# Patient Record
Sex: FEMALE | Race: WHITE | Employment: OTHER | URBAN - METROPOLITAN AREA
[De-identification: names, ages, dates, MRNs, and addresses within clinical notes are randomized per-mention and may not be internally consistent; named-entity substitution may affect disease eponyms.]

---

## 2017-01-05 ENCOUNTER — LAB ENCOUNTER (OUTPATIENT)
Dept: LAB | Age: 82
End: 2017-01-05
Attending: INTERNAL MEDICINE
Payer: MEDICARE

## 2017-01-05 DIAGNOSIS — C91.10 CLL (CHRONIC LYMPHOCYTIC LEUKEMIA) (HCC): ICD-10-CM

## 2017-01-05 DIAGNOSIS — Z79.4 TYPE 2 DIABETES MELLITUS WITH BOTH EYES AFFECTED BY MODERATE NONPROLIFERATIVE RETINOPATHY WITHOUT MACULAR EDEMA, WITH LONG-TERM CURRENT USE OF INSULIN (HCC): ICD-10-CM

## 2017-01-05 DIAGNOSIS — E78.00 PURE HYPERCHOLESTEROLEMIA: ICD-10-CM

## 2017-01-05 DIAGNOSIS — E11.3393 TYPE 2 DIABETES MELLITUS WITH BOTH EYES AFFECTED BY MODERATE NONPROLIFERATIVE RETINOPATHY WITHOUT MACULAR EDEMA, WITH LONG-TERM CURRENT USE OF INSULIN (HCC): ICD-10-CM

## 2017-01-05 DIAGNOSIS — I10 ESSENTIAL HYPERTENSION WITH GOAL BLOOD PRESSURE LESS THAN 140/90: ICD-10-CM

## 2017-01-05 LAB
ALBUMIN SERPL BCP-MCNC: 3.8 G/DL (ref 3.5–4.8)
ALBUMIN/GLOB SERPL: 1.4 {RATIO} (ref 1–2)
ALP SERPL-CCNC: 80 U/L (ref 32–100)
ALT SERPL-CCNC: 28 U/L (ref 14–54)
ANION GAP SERPL CALC-SCNC: 9 MMOL/L (ref 0–18)
AST SERPL-CCNC: 31 U/L (ref 15–41)
BASOPHILS # BLD: 0 K/UL (ref 0–0.2)
BASOPHILS NFR BLD: 0 %
BILIRUB SERPL-MCNC: 0.9 MG/DL (ref 0.3–1.2)
BUN SERPL-MCNC: 20 MG/DL (ref 8–20)
BUN/CREAT SERPL: 18.5 (ref 10–20)
CALCIUM SERPL-MCNC: 9.3 MG/DL (ref 8.5–10.5)
CHLORIDE SERPL-SCNC: 102 MMOL/L (ref 95–110)
CO2 SERPL-SCNC: 26 MMOL/L (ref 22–32)
CREAT SERPL-MCNC: 1.08 MG/DL (ref 0.5–1.5)
EOSINOPHIL # BLD: 0.3 K/UL (ref 0–0.7)
EOSINOPHIL NFR BLD: 1 %
ERYTHROCYTE [DISTWIDTH] IN BLOOD BY AUTOMATED COUNT: 14.9 % (ref 11–15)
GLOBULIN PLAS-MCNC: 2.7 G/DL (ref 2.5–3.7)
GLUCOSE SERPL-MCNC: 291 MG/DL (ref 70–99)
HBA1C MFR BLD: 7.9 % (ref 4–6)
HCT VFR BLD AUTO: 39.5 % (ref 35–48)
HGB BLD-MCNC: 12.6 G/DL (ref 12–16)
LYMPHOCYTES # BLD: 9.2 K/UL (ref 1–4)
LYMPHOCYTES NFR BLD: 34 %
MCH RBC QN AUTO: 28.4 PG (ref 27–32)
MCHC RBC AUTO-ENTMCNC: 32 G/DL (ref 32–37)
MCV RBC AUTO: 88.8 FL (ref 80–100)
MONOCYTES # BLD: 1.4 K/UL (ref 0–1)
MONOCYTES NFR BLD: 5 %
NEUTROPHILS # BLD AUTO: 16.3 K/UL (ref 1.8–7.7)
NEUTROPHILS NFR BLD: 59 %
NEUTS BAND NFR BLD: 1 %
OSMOLALITY UR CALC.SUM OF ELEC: 297 MOSM/KG (ref 275–295)
PLATELET # BLD AUTO: 259 K/UL (ref 140–400)
PMV BLD AUTO: 8.4 FL (ref 7.4–10.3)
POTASSIUM SERPL-SCNC: 4.6 MMOL/L (ref 3.3–5.1)
PROT SERPL-MCNC: 6.5 G/DL (ref 5.9–8.4)
RBC # BLD AUTO: 4.44 M/UL (ref 3.7–5.4)
SODIUM SERPL-SCNC: 137 MMOL/L (ref 136–144)
WBC # BLD AUTO: 27.1 K/UL (ref 4–11)

## 2017-01-05 PROCEDURE — 36415 COLL VENOUS BLD VENIPUNCTURE: CPT

## 2017-01-05 PROCEDURE — 83036 HEMOGLOBIN GLYCOSYLATED A1C: CPT

## 2017-01-05 PROCEDURE — 85025 COMPLETE CBC W/AUTO DIFF WBC: CPT

## 2017-01-05 PROCEDURE — 85007 BL SMEAR W/DIFF WBC COUNT: CPT

## 2017-01-05 PROCEDURE — 80053 COMPREHEN METABOLIC PANEL: CPT

## 2017-01-05 PROCEDURE — 85027 COMPLETE CBC AUTOMATED: CPT

## 2017-01-11 ENCOUNTER — OFFICE VISIT (OUTPATIENT)
Dept: INTERNAL MEDICINE CLINIC | Facility: CLINIC | Age: 82
End: 2017-01-11

## 2017-01-11 VITALS
BODY MASS INDEX: 21 KG/M2 | WEIGHT: 129 LBS | DIASTOLIC BLOOD PRESSURE: 63 MMHG | SYSTOLIC BLOOD PRESSURE: 154 MMHG | HEART RATE: 50 BPM | RESPIRATION RATE: 18 BRPM

## 2017-01-11 DIAGNOSIS — E11.9 TYPE 2 DIABETES MELLITUS WITHOUT COMPLICATION, WITH LONG-TERM CURRENT USE OF INSULIN (HCC): Primary | ICD-10-CM

## 2017-01-11 DIAGNOSIS — Z79.4 TYPE 2 DIABETES MELLITUS WITHOUT COMPLICATION, WITH LONG-TERM CURRENT USE OF INSULIN (HCC): Primary | ICD-10-CM

## 2017-01-11 DIAGNOSIS — I10 ESSENTIAL HYPERTENSION WITH GOAL BLOOD PRESSURE LESS THAN 140/90: ICD-10-CM

## 2017-01-11 DIAGNOSIS — E78.5 HYPERLIPIDEMIA LDL GOAL <100: ICD-10-CM

## 2017-01-11 DIAGNOSIS — R93.89 ABNORMAL CHEST X-RAY: ICD-10-CM

## 2017-01-11 PROCEDURE — 99214 OFFICE O/P EST MOD 30 MIN: CPT | Performed by: INTERNAL MEDICINE

## 2017-01-11 PROCEDURE — G0463 HOSPITAL OUTPT CLINIC VISIT: HCPCS | Performed by: INTERNAL MEDICINE

## 2017-01-12 NOTE — PROGRESS NOTES
HPI:    Patient ID: Cain Garrison is a 80year old female presents for follow-up of diabetes, hypertension, leukemia, hyperlipidemia.     HPI  Patient reports that she has no pain, 2-1/2 months ago she ended up at [facility] after a hypo Disp: 30 tablet Rfl: 6   METOPROLOL SUCCINATE  MG Oral Tablet 24 Hr TAKE 1 AND 1/2 TABLETS BY MOUTH DAILY Disp: 45 tablet Rfl: 6   ASPIR-LOW 81 MG Oral Tab EC TAKE 1 TABLET BY MOUTH DAILY Disp: 30 tablet Rfl: 11   TRAVATAN Z 0.004 % Ophthalmic Soluti (primary encounter diagnosis) continue current medications, encourage patient to make sure she eats 3 meals a day will check labs in 3 months follow-up in 3 months we will check lipids, continue Lipitor for now  Hyperlipidemia ldl goal <100  Abnormal chest

## 2017-02-08 ENCOUNTER — HOSPITAL ENCOUNTER (OUTPATIENT)
Dept: CT IMAGING | Age: 82
Discharge: HOME | End: 2017-02-08
Attending: INTERNAL MEDICINE
Payer: MEDICARE

## 2017-02-08 DIAGNOSIS — R93.89 ABNORMAL CHEST X-RAY: ICD-10-CM

## 2017-02-08 PROCEDURE — 71250 CT THORAX DX C-: CPT

## 2017-02-15 ENCOUNTER — TELEPHONE (OUTPATIENT)
Dept: INTERNAL MEDICINE CLINIC | Facility: CLINIC | Age: 82
End: 2017-02-15

## 2017-02-15 DIAGNOSIS — I27.20 PULMONARY HYPERTENSION (HCC): Primary | ICD-10-CM

## 2017-02-16 ENCOUNTER — OFFICE VISIT (OUTPATIENT)
Dept: OTOLARYNGOLOGY | Facility: CLINIC | Age: 82
End: 2017-02-16

## 2017-02-16 VITALS
TEMPERATURE: 99 F | WEIGHT: 128 LBS | DIASTOLIC BLOOD PRESSURE: 50 MMHG | HEIGHT: 65 IN | SYSTOLIC BLOOD PRESSURE: 132 MMHG | BODY MASS INDEX: 21.33 KG/M2 | HEART RATE: 64 BPM

## 2017-02-16 DIAGNOSIS — H61.23 BILATERAL IMPACTED CERUMEN: Primary | ICD-10-CM

## 2017-02-16 PROCEDURE — 69210 REMOVE IMPACTED EAR WAX UNI: CPT | Performed by: OTOLARYNGOLOGY

## 2017-02-16 NOTE — PROGRESS NOTES
Crisoforo Babinski is a 80year old female.   Patient presents with:  Ear Problem: ear cleaning      HISTORY OF PRESENT ILLNESS    Patient presents for cerumen removal. No other complaints or concerns at this time    Social History    Marital Status:  Dyspnea and wheezing. Cardio Negative Chest pain, irregular heartbeat/palpitations and syncope. GI Negative Abdominal pain and diarrhea. Endocrine Negative Cold intolerance and heat intolerance. Neuro Negative Tremors.    Psych Negative Anxiety and NIGHTLY, Disp: 30 tablet, Rfl: 6  •  LISINOPRIL 40 MG Oral Tab, TAKE 1 TABLET BY MOUTH ONCE A DAY, Disp: 30 tablet, Rfl: 6  •  METOPROLOL SUCCINATE  MG Oral Tablet 24 Hr, TAKE 1 AND 1/2 TABLETS BY MOUTH DAILY, Disp: 45 tablet, Rfl: 6  •  UNIFINE PENT

## 2017-02-20 ENCOUNTER — TELEPHONE (OUTPATIENT)
Dept: INTERNAL MEDICINE CLINIC | Facility: CLINIC | Age: 82
End: 2017-02-20

## 2017-02-20 DIAGNOSIS — I27.20 PULMONARY HYPERTENSION (HCC): Primary | ICD-10-CM

## 2017-02-20 NOTE — TELEPHONE ENCOUNTER
Telephone Encounter by Jimmy Taylor at 2/20/2017  9:13 AM      Author: Jimmy Taylor Service: (none) Author Type: (none)     Filed: 2/20/2017  9:17 AM Note Time: 2/20/2017  9:13 AM Status: Addendum     : Jimmy Taylor       Related Notes: O

## 2017-02-20 NOTE — TELEPHONE ENCOUNTER
Pt's mom states she received a call from the weekend from [address] [place] [address], not sure if its die to mom's results. Best call back number is [phone]. Please advise.

## 2017-02-21 NOTE — TELEPHONE ENCOUNTER
Spoke to daughter Jaye Quintana, reported CT scan findings small pulmonary nodules that need follow-up in 1 year.   Enlarged pulmonary trunk possible sign of pulmonary hypertension, advised to have 2D Doppler echocardiogram done, she will help patient to make arran

## 2017-02-22 NOTE — TELEPHONE ENCOUNTER
John/Josefina returning RN call  Tara Singh she did get pt's scan results, other tests have been scheduled  No callback necessary unless call was for another issue

## 2017-02-22 NOTE — TELEPHONE ENCOUNTER
[facility] transfer to [phone] or [phone] on  2/22/17   Dr. Eugenie Mary already discussed on 2/15/17 encounter last weekl

## 2017-03-02 ENCOUNTER — TELEPHONE (OUTPATIENT)
Dept: INTERNAL MEDICINE CLINIC | Facility: CLINIC | Age: 82
End: 2017-03-02

## 2017-03-02 NOTE — TELEPHONE ENCOUNTER
Barrie Patel from [facility] is calling requesting a refill on this pt's Contour 50's test strips. Please include dx code and testing frequency. Please send script to [facility].  Pt is out of strips

## 2017-03-04 NOTE — TELEPHONE ENCOUNTER
Refill Protocol Appointment Criteria: Refilled per protocol    · Appointment scheduled in the past 12 months or in the next 3 months  Recent Visits       Provider Department Primary Dx    1 month ago Ricci Dias MD [facility], [facility], [address] [place] [address]

## 2017-03-07 RX ORDER — AMLODIPINE BESYLATE 5 MG/1
TABLET ORAL
Qty: 180 TABLET | Refills: 3 | Status: SHIPPED | OUTPATIENT
Start: 2017-03-07 | End: 2018-01-08

## 2017-03-13 ENCOUNTER — HOSPITAL ENCOUNTER (OUTPATIENT)
Dept: CV DIAGNOSTICS | Facility: HOSPITAL | Age: 82
Discharge: HOME | End: 2017-03-13
Attending: INTERNAL MEDICINE
Payer: MEDICARE

## 2017-03-13 DIAGNOSIS — I27.20 PULMONARY HYPERTENSION (HCC): ICD-10-CM

## 2017-03-13 PROCEDURE — 93306 TTE W/DOPPLER COMPLETE: CPT

## 2017-03-13 PROCEDURE — 93306 TTE W/DOPPLER COMPLETE: CPT | Performed by: INTERNAL MEDICINE

## 2017-03-16 ENCOUNTER — TELEPHONE (OUTPATIENT)
Dept: INTERNAL MEDICINE CLINIC | Facility: CLINIC | Age: 82
End: 2017-03-16

## 2017-03-16 NOTE — TELEPHONE ENCOUNTER
Nurse from [facility] was to to call if Pt bloods sugar was getting low . Would like to speak to a nurse .

## 2017-03-17 ENCOUNTER — TELEPHONE (OUTPATIENT)
Dept: INTERNAL MEDICINE CLINIC | Facility: CLINIC | Age: 82
End: 2017-03-17

## 2017-03-17 DIAGNOSIS — C91.10 CLL (CHRONIC LYMPHOCYTIC LEUKEMIA) (HCC): Primary | ICD-10-CM

## 2017-03-17 DIAGNOSIS — E11.319 TYPE 2 DIABETES MELLITUS WITH RETINOPATHY, WITH LONG-TERM CURRENT USE OF INSULIN, MACULAR EDEMA PRESENCE UNSPECIFIED, UNSPECIFIED LATERALITY, UNSPECIFIED RETINOPATHY SEVERITY (HCC): ICD-10-CM

## 2017-03-17 DIAGNOSIS — Z79.4 TYPE 2 DIABETES MELLITUS WITH RETINOPATHY, WITH LONG-TERM CURRENT USE OF INSULIN, MACULAR EDEMA PRESENCE UNSPECIFIED, UNSPECIFIED LATERALITY, UNSPECIFIED RETINOPATHY SEVERITY (HCC): ICD-10-CM

## 2017-03-17 NOTE — TELEPHONE ENCOUNTER
Nurse from [facility] stated they had called last night to ask in regards to insulin that her blood sugar was 94. They gave her 5 Units of her insulin instead of the 15 Units she has ordered. Blood sugar this morning was 219.      NK as an [place]

## 2017-03-23 RX ORDER — INSULIN ASPART 100 [IU]/ML
INJECTION, SUSPENSION SUBCUTANEOUS
Qty: 15 ML | Refills: 10 | Status: SHIPPED | OUTPATIENT
Start: 2017-03-23 | End: 2017-07-12

## 2017-04-03 ENCOUNTER — HOSPITAL ENCOUNTER (EMERGENCY)
Facility: HOSPITAL | Age: 82
Discharge: HOME | End: 2017-04-03
Attending: EMERGENCY MEDICINE
Payer: MEDICARE

## 2017-04-03 ENCOUNTER — APPOINTMENT (OUTPATIENT)
Dept: GENERAL RADIOLOGY | Facility: HOSPITAL | Age: 82
End: 2017-04-03
Attending: EMERGENCY MEDICINE
Payer: MEDICARE

## 2017-04-03 VITALS
OXYGEN SATURATION: 95 % | RESPIRATION RATE: 18 BRPM | TEMPERATURE: 98 F | HEART RATE: 71 BPM | WEIGHT: 129 LBS | HEIGHT: 65 IN | BODY MASS INDEX: 21.49 KG/M2 | DIASTOLIC BLOOD PRESSURE: 83 MMHG | SYSTOLIC BLOOD PRESSURE: 155 MMHG

## 2017-04-03 DIAGNOSIS — M25.552 LEFT HIP PAIN: Primary | ICD-10-CM

## 2017-04-03 PROCEDURE — 73502 X-RAY EXAM HIP UNI 2-3 VIEWS: CPT

## 2017-04-03 PROCEDURE — 99283 EMERGENCY DEPT VISIT LOW MDM: CPT

## 2017-04-03 RX ORDER — TRAMADOL HYDROCHLORIDE 50 MG/1
50 TABLET ORAL ONCE
Status: COMPLETED | OUTPATIENT
Start: 2017-04-03 | End: 2017-04-03

## 2017-04-03 RX ORDER — TRAMADOL HYDROCHLORIDE 50 MG/1
50 TABLET ORAL EVERY 12 HOURS PRN
Qty: 10 TABLET | Refills: 0 | Status: SHIPPED | OUTPATIENT
Start: 2017-04-03 | End: 2017-04-08

## 2017-04-03 NOTE — ED PROVIDER NOTES
Patient Seen in: [facility] AND [facility] Emergency Department    History   Patient presents with:  Hip Pain    Stated Complaint: left hip pain      HPI    79 yo F with PMH DM, HTN, colon CA s/p resection, CLL presenting with one day of atraumatic left hip pain. TABLETS BY MOUTH DAILY   UNIFINE PENTIPS 31G X 6 MM Does not apply Misc,  USE TWICE DAILY WITH INSULIN   ASPIR-LOW 81 MG Oral Tab EC,  TAKE 1 TABLET BY MOUTH DAILY   TRAVATAN Z 0.004 % Ophthalmic Solution,         Family History   Problem Relation Age of O warm.   Psychiatric: Cooperative. Nursing note and vitals reviewed.         ED Course   Labs Reviewed - No data to display  Xr Hip W Or Wo Pelvis 2 Or 3 Views, Left (cpt=73502)    4/3/2017  PROCEDURE: XR HIP W OR WO PELVIS 2 OR 3 VIEWS, LEFT (CPT=73502)  C (twelve) hours as needed.   Qty: 10 tablet Refills: 0

## 2017-04-03 NOTE — ED NOTES
All discharge instructions including discharge meds and follow up reviewed with patient. Verbalized understanding. . Patient wheelchaired out of ED in no apparent distress.

## 2017-04-05 ENCOUNTER — LAB ENCOUNTER (OUTPATIENT)
Dept: LAB | Age: 82
End: 2017-04-05
Attending: INTERNAL MEDICINE
Payer: MEDICARE

## 2017-04-05 DIAGNOSIS — E78.5 HYPERLIPIDEMIA LDL GOAL <100: ICD-10-CM

## 2017-04-05 DIAGNOSIS — E11.9 TYPE 2 DIABETES MELLITUS WITHOUT COMPLICATION, WITH LONG-TERM CURRENT USE OF INSULIN (HCC): ICD-10-CM

## 2017-04-05 DIAGNOSIS — Z79.4 TYPE 2 DIABETES MELLITUS WITHOUT COMPLICATION, WITH LONG-TERM CURRENT USE OF INSULIN (HCC): ICD-10-CM

## 2017-04-05 PROCEDURE — 85025 COMPLETE CBC W/AUTO DIFF WBC: CPT

## 2017-04-05 PROCEDURE — 80053 COMPREHEN METABOLIC PANEL: CPT

## 2017-04-05 PROCEDURE — 80061 LIPID PANEL: CPT

## 2017-04-05 PROCEDURE — 36415 COLL VENOUS BLD VENIPUNCTURE: CPT

## 2017-04-05 PROCEDURE — 83036 HEMOGLOBIN GLYCOSYLATED A1C: CPT

## 2017-04-12 ENCOUNTER — OFFICE VISIT (OUTPATIENT)
Dept: INTERNAL MEDICINE CLINIC | Facility: CLINIC | Age: 82
End: 2017-04-12

## 2017-04-12 VITALS
HEART RATE: 53 BPM | DIASTOLIC BLOOD PRESSURE: 53 MMHG | RESPIRATION RATE: 16 BRPM | BODY MASS INDEX: 19 KG/M2 | WEIGHT: 113 LBS | SYSTOLIC BLOOD PRESSURE: 141 MMHG

## 2017-04-12 DIAGNOSIS — I10 ESSENTIAL HYPERTENSION WITH GOAL BLOOD PRESSURE LESS THAN 140/90: ICD-10-CM

## 2017-04-12 DIAGNOSIS — Z79.4 TYPE 2 DIABETES MELLITUS WITHOUT COMPLICATION, WITH LONG-TERM CURRENT USE OF INSULIN (HCC): Primary | ICD-10-CM

## 2017-04-12 DIAGNOSIS — E78.5 HYPERLIPIDEMIA LDL GOAL <100: ICD-10-CM

## 2017-04-12 DIAGNOSIS — C91.10 CLL (CHRONIC LYMPHOCYTIC LEUKEMIA) (HCC): ICD-10-CM

## 2017-04-12 DIAGNOSIS — E11.9 TYPE 2 DIABETES MELLITUS WITHOUT COMPLICATION, WITH LONG-TERM CURRENT USE OF INSULIN (HCC): Primary | ICD-10-CM

## 2017-04-12 PROCEDURE — 99214 OFFICE O/P EST MOD 30 MIN: CPT | Performed by: INTERNAL MEDICINE

## 2017-04-12 PROCEDURE — G0463 HOSPITAL OUTPT CLINIC VISIT: HCPCS | Performed by: INTERNAL MEDICINE

## 2017-04-12 RX ORDER — ATORVASTATIN CALCIUM 10 MG/1
TABLET, FILM COATED ORAL
Qty: 90 TABLET | Refills: 0 | Status: SHIPPED | OUTPATIENT
Start: 2017-04-12 | End: 2017-07-17

## 2017-04-12 RX ORDER — TRAMADOL HYDROCHLORIDE 50 MG/1
TABLET ORAL
Qty: 30 TABLET | Refills: 1 | Status: SHIPPED
Start: 2017-04-12 | End: 2019-01-01

## 2017-04-12 RX ORDER — METOPROLOL SUCCINATE 100 MG/1
TABLET, EXTENDED RELEASE ORAL
Qty: 45 TABLET | Refills: 2 | Status: SHIPPED | OUTPATIENT
Start: 2017-04-12 | End: 2017-07-10

## 2017-04-12 RX ORDER — LISINOPRIL 40 MG/1
TABLET ORAL
Qty: 90 TABLET | Refills: 0 | Status: SHIPPED | OUTPATIENT
Start: 2017-04-12 | End: 2017-07-17

## 2017-04-12 NOTE — TELEPHONE ENCOUNTER
Hypertensive Medications  Protocol Criteria:  · Appointment scheduled in the past 6 months or in the next 3 months  · BMP or CMP in the past 12 months  · Creatinine result < 2  Recent Visits       Provider Department Primary Dx    3 months ago Ghassan Carver months ago Kirsten Chavez MD [facility], [facility], [address], [place] Type 2 diabetes mellitus without complication, with long-term current use of insulin ([facility] 75.)    6 months ago Kirsten Chavez MD [facility], [facility], Main [address], [place] CLL (chronic lymphocytic herbert

## 2017-04-13 NOTE — PROGRESS NOTES
HPI:    Patient ID: Sameer Ly is a 80year old female presents for follow-up of multiple medical conditions.   HPI  Patient reports that about week ago she had episodes of hypoglycemia, when she was unresponsive, paramedics were called administered Timolol Maleate 0.5 % Ophthalmic Solution  Disp:  Rfl:    ASPIR-LOW 81 MG Oral Tab EC TAKE 1 TABLET BY MOUTH DAILY Disp: 30 tablet Rfl: 11   TRAVATAN Z 0.004 % Ophthalmic Solution  Disp:  Rfl:    LISINOPRIL 40 MG Oral Tab TAKE 1 TABLET BY MOUTH ONCE A DA Judgment normal.              ASSESSMENT/PLAN:   Type 2 diabetes mellitus without complication, with long-term current use of insulin (hcc)  (primary encounter diagnosis)  Uncontrolled with recurrence episodes of hypoglycemia, patient is not eating consist

## 2017-06-08 ENCOUNTER — OFFICE VISIT (OUTPATIENT)
Dept: AUDIOLOGY | Facility: CLINIC | Age: 82
End: 2017-06-08

## 2017-06-08 ENCOUNTER — OFFICE VISIT (OUTPATIENT)
Dept: OTOLARYNGOLOGY | Facility: CLINIC | Age: 82
End: 2017-06-08

## 2017-06-08 VITALS
BODY MASS INDEX: 18.16 KG/M2 | DIASTOLIC BLOOD PRESSURE: 62 MMHG | SYSTOLIC BLOOD PRESSURE: 134 MMHG | TEMPERATURE: 98 F | HEIGHT: 66 IN | HEART RATE: 66 BPM | WEIGHT: 113 LBS | RESPIRATION RATE: 14 BRPM

## 2017-06-08 DIAGNOSIS — H61.23 BILATERAL IMPACTED CERUMEN: Primary | ICD-10-CM

## 2017-06-08 DIAGNOSIS — H90.3 SENSORINEURAL HEARING LOSS, BILATERAL: Primary | ICD-10-CM

## 2017-06-08 PROCEDURE — 92593 HEARING AID CHECK, BOTH EARS: CPT | Performed by: AUDIOLOGIST

## 2017-06-08 PROCEDURE — 69210 REMOVE IMPACTED EAR WAX UNI: CPT | Performed by: OTOLARYNGOLOGY

## 2017-06-08 NOTE — PROGRESS NOTES
Sonu Patino is a 80year old female. Patient presents with: Follow - Up: here for ear cleaning - was seen back in 2/2017 by Dr Lashaun Jha - no pain and no other c/o - just the cleaning.     HPI:   She feels that her ears are congested once again     Curr • Essential hypertension       Social History:    Smoking Status: Never Smoker                      Smokeless Status: Never Used                        Alcohol Use: No                 REVIEW OF SYSTEMS:   GENERAL HEALTH: feels well otherwise  GENERAL : d

## 2017-06-08 NOTE — PROGRESS NOTES
[facility]  7/18/1926  [id number]      Pt was seen for a six months follow-up. Aids were in need of cleaning/maintenance. Cleaned and suctioned aids and cShells. Completed sanitizing and drying cycle.   Changed batteri

## 2017-06-21 ENCOUNTER — AUDIOLOGY DOCUMENTATION (OUTPATIENT)
Dept: AUDIOLOGY | Facility: CLINIC | Age: 82
End: 2017-06-21

## 2017-06-21 RX ORDER — ASPIRIN 81 MG/1
81 TABLET ORAL
Qty: 30 TABLET | Refills: 6 | Status: SHIPPED | OUTPATIENT
Start: 2017-06-21 | End: 2017-07-12

## 2017-06-21 NOTE — TELEPHONE ENCOUNTER
[employer] [place] cShell. Called pt. Daughter, Jamarcus Bautista seen at front counter by Dr Mike Payment to replace the cShell onto pt's aid. NC- aid under warranty. RV as previously scheduled. Karli Villalba  6/21/17

## 2017-06-30 ENCOUNTER — LAB ENCOUNTER (OUTPATIENT)
Dept: LAB | Age: 82
End: 2017-06-30
Attending: INTERNAL MEDICINE
Payer: MEDICARE

## 2017-06-30 DIAGNOSIS — C91.10 CLL (CHRONIC LYMPHOCYTIC LEUKEMIA) (HCC): ICD-10-CM

## 2017-06-30 DIAGNOSIS — Z79.4 TYPE 2 DIABETES MELLITUS WITH RETINOPATHY, WITH LONG-TERM CURRENT USE OF INSULIN, MACULAR EDEMA PRESENCE UNSPECIFIED, UNSPECIFIED LATERALITY, UNSPECIFIED RETINOPATHY SEVERITY (HCC): ICD-10-CM

## 2017-06-30 DIAGNOSIS — E11.319 TYPE 2 DIABETES MELLITUS WITH RETINOPATHY, WITH LONG-TERM CURRENT USE OF INSULIN, MACULAR EDEMA PRESENCE UNSPECIFIED, UNSPECIFIED LATERALITY, UNSPECIFIED RETINOPATHY SEVERITY (HCC): ICD-10-CM

## 2017-06-30 LAB
ALBUMIN SERPL BCP-MCNC: 3.9 G/DL (ref 3.5–4.8)
ALBUMIN/GLOB SERPL: 1.6 {RATIO} (ref 1–2)
ALP SERPL-CCNC: 81 U/L (ref 32–100)
ALT SERPL-CCNC: 23 U/L (ref 14–54)
ANION GAP SERPL CALC-SCNC: 9 MMOL/L (ref 0–18)
AST SERPL-CCNC: 28 U/L (ref 15–41)
BASOPHILS # BLD: 0.2 K/UL (ref 0–0.2)
BASOPHILS NFR BLD: 1 %
BILIRUB SERPL-MCNC: 0.9 MG/DL (ref 0.3–1.2)
BUN SERPL-MCNC: 21 MG/DL (ref 8–20)
BUN/CREAT SERPL: 19.6 (ref 10–20)
CALCIUM SERPL-MCNC: 9.4 MG/DL (ref 8.5–10.5)
CHLORIDE SERPL-SCNC: 107 MMOL/L (ref 95–110)
CO2 SERPL-SCNC: 22 MMOL/L (ref 22–32)
CREAT SERPL-MCNC: 1.07 MG/DL (ref 0.5–1.5)
EOSINOPHIL # BLD: 0.2 K/UL (ref 0–0.7)
EOSINOPHIL NFR BLD: 1 %
ERYTHROCYTE [DISTWIDTH] IN BLOOD BY AUTOMATED COUNT: 15.3 % (ref 11–15)
GLOBULIN PLAS-MCNC: 2.4 G/DL (ref 2.5–3.7)
GLUCOSE SERPL-MCNC: 261 MG/DL (ref 70–99)
HBA1C MFR BLD: 8.5 % (ref 4–6)
HCT VFR BLD AUTO: 38.1 % (ref 35–48)
HGB BLD-MCNC: 12.3 G/DL (ref 12–16)
LYMPHOCYTES # BLD: 11.3 K/UL (ref 1–4)
LYMPHOCYTES NFR BLD: 52 %
MCH RBC QN AUTO: 28.2 PG (ref 27–32)
MCHC RBC AUTO-ENTMCNC: 32.4 G/DL (ref 32–37)
MCV RBC AUTO: 87.1 FL (ref 80–100)
MONOCYTES # BLD: 1 K/UL (ref 0–1)
MONOCYTES NFR BLD: 4 %
NEUTROPHILS # BLD AUTO: 9.2 K/UL (ref 1.8–7.7)
NEUTROPHILS NFR BLD: 42 %
OSMOLALITY UR CALC.SUM OF ELEC: 298 MOSM/KG (ref 275–295)
PLATELET # BLD AUTO: 212 K/UL (ref 140–400)
PMV BLD AUTO: 8.5 FL (ref 7.4–10.3)
POTASSIUM SERPL-SCNC: 4.2 MMOL/L (ref 3.3–5.1)
PROT SERPL-MCNC: 6.3 G/DL (ref 5.9–8.4)
RBC # BLD AUTO: 4.37 M/UL (ref 3.7–5.4)
SODIUM SERPL-SCNC: 138 MMOL/L (ref 136–144)
WBC # BLD AUTO: 21.8 K/UL (ref 4–11)

## 2017-06-30 PROCEDURE — 36415 COLL VENOUS BLD VENIPUNCTURE: CPT

## 2017-06-30 PROCEDURE — 83036 HEMOGLOBIN GLYCOSYLATED A1C: CPT

## 2017-06-30 PROCEDURE — 85025 COMPLETE CBC W/AUTO DIFF WBC: CPT

## 2017-06-30 PROCEDURE — 80053 COMPREHEN METABOLIC PANEL: CPT

## 2017-07-10 RX ORDER — METOPROLOL SUCCINATE 100 MG/1
TABLET, EXTENDED RELEASE ORAL
Qty: 45 TABLET | Refills: 6 | Status: SHIPPED | OUTPATIENT
Start: 2017-07-10 | End: 2018-02-06

## 2017-07-12 ENCOUNTER — OFFICE VISIT (OUTPATIENT)
Dept: INTERNAL MEDICINE CLINIC | Facility: CLINIC | Age: 82
End: 2017-07-12

## 2017-07-12 VITALS
HEART RATE: 55 BPM | DIASTOLIC BLOOD PRESSURE: 59 MMHG | SYSTOLIC BLOOD PRESSURE: 138 MMHG | BODY MASS INDEX: 21 KG/M2 | WEIGHT: 128 LBS

## 2017-07-12 DIAGNOSIS — I10 ESSENTIAL HYPERTENSION WITH GOAL BLOOD PRESSURE LESS THAN 140/90: ICD-10-CM

## 2017-07-12 DIAGNOSIS — C91.10 CLL (CHRONIC LYMPHOCYTIC LEUKEMIA) (HCC): ICD-10-CM

## 2017-07-12 DIAGNOSIS — Z79.4 TYPE 2 DIABETES MELLITUS WITH MODERATE NONPROLIFERATIVE RETINOPATHY WITHOUT MACULAR EDEMA, WITH LONG-TERM CURRENT USE OF INSULIN, UNSPECIFIED LATERALITY (HCC): Primary | ICD-10-CM

## 2017-07-12 DIAGNOSIS — E11.3399 TYPE 2 DIABETES MELLITUS WITH MODERATE NONPROLIFERATIVE RETINOPATHY WITHOUT MACULAR EDEMA, WITH LONG-TERM CURRENT USE OF INSULIN, UNSPECIFIED LATERALITY (HCC): Primary | ICD-10-CM

## 2017-07-12 DIAGNOSIS — E08.3599 PROLIFERATIVE DIABETIC RETINOPATHY WITHOUT MACULAR EDEMA ASSOCIATED WITH DIABETES MELLITUS DUE TO UNDERLYING CONDITION, UNSPECIFIED LATERALITY (HCC): ICD-10-CM

## 2017-07-12 PROCEDURE — G0463 HOSPITAL OUTPT CLINIC VISIT: HCPCS | Performed by: INTERNAL MEDICINE

## 2017-07-12 PROCEDURE — 99214 OFFICE O/P EST MOD 30 MIN: CPT | Performed by: INTERNAL MEDICINE

## 2017-07-15 NOTE — PROGRESS NOTES
HPI:    Patient ID: Dana Sawant is a 80year old female presents for follow-up on multiple medical conditions    HPI  Patient reports that she has been doing fair, she reports no hypoglycemic episodes anymore, states that she is eating breakfast ever breakfast  And  15  Units  Before  dinner Disp: 1 pen Rfl: 0   Timolol Maleate 0.5 % Ophthalmic Solution  Disp:  Rfl:    TRAVATAN Z 0.004 % Ophthalmic Solution  Disp:  Rfl:    Glucose Blood (ACCU-CHEK ACTIVE) In Vitro Strip test by Intradermal route 3 time lymphocytic leukemia) (hcc) stable, will monitor CBC every 3 months see hematology as planned  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

## 2017-07-18 RX ORDER — ATORVASTATIN CALCIUM 10 MG/1
TABLET, FILM COATED ORAL
Qty: 90 TABLET | Refills: 2 | Status: SHIPPED | OUTPATIENT
Start: 2017-07-18 | End: 2018-04-26

## 2017-07-18 RX ORDER — LISINOPRIL 40 MG/1
TABLET ORAL
Qty: 90 TABLET | Refills: 2 | Status: SHIPPED | OUTPATIENT
Start: 2017-07-18 | End: 2018-04-10

## 2017-08-07 ENCOUNTER — APPOINTMENT (OUTPATIENT)
Dept: GENERAL RADIOLOGY | Facility: HOSPITAL | Age: 82
End: 2017-08-07
Attending: EMERGENCY MEDICINE
Payer: MEDICARE

## 2017-08-07 ENCOUNTER — APPOINTMENT (OUTPATIENT)
Dept: CT IMAGING | Facility: HOSPITAL | Age: 82
End: 2017-08-07
Attending: EMERGENCY MEDICINE
Payer: MEDICARE

## 2017-08-07 ENCOUNTER — HOSPITAL ENCOUNTER (EMERGENCY)
Facility: HOSPITAL | Age: 82
Discharge: HOME | End: 2017-08-07
Attending: EMERGENCY MEDICINE
Payer: MEDICARE

## 2017-08-07 VITALS
TEMPERATURE: 98 F | BODY MASS INDEX: 21.49 KG/M2 | RESPIRATION RATE: 18 BRPM | DIASTOLIC BLOOD PRESSURE: 52 MMHG | OXYGEN SATURATION: 98 % | SYSTOLIC BLOOD PRESSURE: 152 MMHG | HEIGHT: 65 IN | HEART RATE: 68 BPM | WEIGHT: 129 LBS

## 2017-08-07 DIAGNOSIS — S42.215A CLOSED NONDISPLACED FRACTURE OF SURGICAL NECK OF LEFT HUMERUS, UNSPECIFIED FRACTURE MORPHOLOGY, INITIAL ENCOUNTER: Primary | ICD-10-CM

## 2017-08-07 DIAGNOSIS — S51.012A ELBOW LACERATION, LEFT, INITIAL ENCOUNTER: ICD-10-CM

## 2017-08-07 PROCEDURE — 70450 CT HEAD/BRAIN W/O DYE: CPT | Performed by: EMERGENCY MEDICINE

## 2017-08-07 PROCEDURE — 73080 X-RAY EXAM OF ELBOW: CPT | Performed by: EMERGENCY MEDICINE

## 2017-08-07 PROCEDURE — 73030 X-RAY EXAM OF SHOULDER: CPT | Performed by: EMERGENCY MEDICINE

## 2017-08-07 PROCEDURE — 99284 EMERGENCY DEPT VISIT MOD MDM: CPT

## 2017-08-07 PROCEDURE — 90471 IMMUNIZATION ADMIN: CPT

## 2017-08-07 RX ORDER — DIAPER,BRIEF,INFANT-TODD,DISP
EACH MISCELLANEOUS AS NEEDED
Status: DISCONTINUED | OUTPATIENT
Start: 2017-08-07 | End: 2017-08-07

## 2017-08-07 RX ORDER — TRAMADOL HYDROCHLORIDE 50 MG/1
50 TABLET ORAL EVERY 4 HOURS PRN
Qty: 20 TABLET | Refills: 0 | Status: SHIPPED | OUTPATIENT
Start: 2017-08-07 | End: 2017-08-14

## 2017-08-07 RX ORDER — ACETAMINOPHEN 500 MG
1000 TABLET ORAL ONCE
Status: COMPLETED | OUTPATIENT
Start: 2017-08-07 | End: 2017-08-07

## 2017-08-07 RX ORDER — LIDOCAINE HYDROCHLORIDE AND EPINEPHRINE 20; 5 MG/ML; UG/ML
20 INJECTION, SOLUTION EPIDURAL; INFILTRATION; INTRACAUDAL; PERINEURAL ONCE
Status: COMPLETED | OUTPATIENT
Start: 2017-08-07 | End: 2017-08-07

## 2017-08-07 NOTE — ED INITIAL ASSESSMENT (HPI)
Pt had a fall while getting her mail. Pt just lost balance. Pt denies LOC and states that she has left shoulder pain and a wound to left elbow.  Pt brought in via ems

## 2017-08-08 PROBLEM — S42.202A CLOSED FRACTURE OF PROXIMAL END OF LEFT HUMERUS, UNSPECIFIED FRACTURE MORPHOLOGY, INITIAL ENCOUNTER: Status: ACTIVE | Noted: 2017-08-08

## 2017-08-09 ENCOUNTER — TELEPHONE (OUTPATIENT)
Dept: FAMILY MEDICINE CLINIC | Facility: CLINIC | Age: 82
End: 2017-08-09

## 2017-08-09 DIAGNOSIS — R30.0 DYSURIA: Primary | ICD-10-CM

## 2017-08-09 NOTE — TELEPHONE ENCOUNTER
Please note. Thank you. Pt called and Gail Wilkes RN answered the phone and states that the pt's T was 96.6 orally. Gail Wilkes informed about the orders for U/A and C/S and asked them to be faxed to #[phone].     Orders entered and faxed with instructions

## 2017-08-09 NOTE — TELEPHONE ENCOUNTER
Actions Requested: NK please advise, [facility] staff asking for order for UA/cx  Problem: dysuria  Onset and Timing: 3 days  Associated Symptoms: frequency and urgency  Aggravating by: na  Alleviated by: na  Triage Note: Symptoms as described above.   Janes Herrera

## 2017-08-11 ENCOUNTER — APPOINTMENT (OUTPATIENT)
Dept: GENERAL RADIOLOGY | Facility: HOSPITAL | Age: 82
End: 2017-08-11
Attending: EMERGENCY MEDICINE
Payer: MEDICARE

## 2017-08-11 ENCOUNTER — HOSPITAL ENCOUNTER (EMERGENCY)
Facility: HOSPITAL | Age: 82
Discharge: HOME | End: 2017-08-11
Attending: EMERGENCY MEDICINE
Payer: MEDICARE

## 2017-08-11 VITALS
SYSTOLIC BLOOD PRESSURE: 170 MMHG | HEART RATE: 64 BPM | DIASTOLIC BLOOD PRESSURE: 52 MMHG | OXYGEN SATURATION: 96 % | BODY MASS INDEX: 21.49 KG/M2 | WEIGHT: 129 LBS | TEMPERATURE: 99 F | RESPIRATION RATE: 18 BRPM | HEIGHT: 65 IN

## 2017-08-11 DIAGNOSIS — K59.00 CONSTIPATION, UNSPECIFIED CONSTIPATION TYPE: ICD-10-CM

## 2017-08-11 DIAGNOSIS — R33.9 URINARY RETENTION: Primary | ICD-10-CM

## 2017-08-11 LAB
BILIRUB UR QL: NEGATIVE
CLARITY UR: CLEAR
COLOR UR: YELLOW
GLUCOSE UR-MCNC: >=500 MG/DL
HGB UR QL STRIP.AUTO: NEGATIVE
KETONES UR-MCNC: NEGATIVE MG/DL
LEUKOCYTE ESTERASE UR QL STRIP.AUTO: NEGATIVE
NITRITE UR QL STRIP.AUTO: NEGATIVE
PH UR: 5 [PH] (ref 5–8)
PROT UR-MCNC: 100 MG/DL
RBC #/AREA URNS AUTO: 1 /HPF
SP GR UR STRIP: 1.02 (ref 1–1.03)
UROBILINOGEN UR STRIP-ACNC: <2
VIT C UR-MCNC: NEGATIVE MG/DL
WBC #/AREA URNS AUTO: 1 /HPF

## 2017-08-11 PROCEDURE — 74000 XR ABDOMEN (KUB) (1 AP VIEW)  (CPT=74000): CPT | Performed by: EMERGENCY MEDICINE

## 2017-08-11 PROCEDURE — 99283 EMERGENCY DEPT VISIT LOW MDM: CPT

## 2017-08-11 PROCEDURE — 81001 URINALYSIS AUTO W/SCOPE: CPT | Performed by: EMERGENCY MEDICINE

## 2017-08-11 NOTE — ED PROVIDER NOTES
Patient Seen in: [facility] AND [facility] Emergency Department    History   Patient presents with:  Urinary Symptoms (urologic)    Stated Complaint: no urine output x2 days    HPI    The patient is a 80-year-old female who presents to the emergency department w HOURS AS NEEDED FOR  PAIN   AMLODIPINE BESYLATE 5 MG Oral Tab,  TAKE 1 TABLET BY MOUTH TWICE DAILY   Glucose Blood (ACCU-CHEK ACTIVE) In Vitro Strip,  test by Intradermal route 3 times every day.  Please dispense for Contour machine   NOVOLOG MIX 70/30 FLEX Mouth/Throat: Oropharynx is clear and moist.   Eyes: Conjunctivae and EOM are normal. Pupils are equal, round, and reactive to light. Neck: Normal range of motion. Neck supple.    Cardiovascular: Normal rate, regular rhythm, normal heart sounds and [place] constipation type    Disposition:  Discharge    Follow-up:  Rj Perkins MD  [address] [place] [id number]  [phone]    Schedule an appointment as soon as possible for a visit in 2 days      Juan David Walters MD  [address] [place] [address]

## 2017-08-11 NOTE — ED INITIAL ASSESSMENT (HPI)
Pt from [facility], states has not had urine output x 2 days. Denies fevers, abdominal pain. States sometimes feels the urge but cannot go. States has decrease in food intake. Last seen here on Monday for left arm fx.

## 2017-08-12 NOTE — DISCHARGE PLANNING
Met with patient regarding a ride home. Patient states she has no one to pick her up as her daughter is at a concert. Patient agreeable to [facility] for discharge. [facility] contacted, ETA 60-90 minutes.

## 2017-08-12 NOTE — ED NOTES
Pt dcd to home via [place], discharge instruction given and voices understanding, attempted to contact pts daughter but not answering, attempted to contact the phone number that is listed from assisted living but no one is answering,pt denies any concern

## 2017-08-12 NOTE — ED PROVIDER NOTES
Patient Seen in: [facility] AND [facility] Emergency Department    History   Patient presents with:  Contusion (musculoskeletal)    Stated Complaint:     HPI    80-year-old female presents for evaluation of left arm pain.   Patient reports mechanical trip and fal units  SQ   Before breakfast  And  15  Units  Before  dinner   Timolol Maleate 0.5 % Ophthalmic Solution,     UNIFINE PENTIPS 31G X 6 MM Does not apply Misc,  USE TWICE DAILY WITH INSULIN   TRAVATAN Z 0.004 % Ophthalmic Solution,         Family History   P are normal. She exhibits no distension. There is no tenderness. There is no rebound and no guarding. Musculoskeletal: She exhibits edema and tenderness.    Limited range of motion to left elbow due to pain, tender to palpation of left elbow, left elbow ed deep structures involved. No tendon injury was identified. The wound was repaired with 4-0 nylon . The wound repair was simple. The procedure was performed by myself.     Patient with evidence of left humeral neck fracture, discussed with Dr. Lilibeth Walters a

## 2017-08-14 ENCOUNTER — TELEPHONE (OUTPATIENT)
Dept: INTERNAL MEDICINE CLINIC | Facility: CLINIC | Age: 82
End: 2017-08-14

## 2017-08-14 NOTE — TELEPHONE ENCOUNTER
Spoke to Dr. Clyde Lewis, patient has appointment on Wednesday with Dr. Jesse Lopez, advised to bring to his attention concerns if she notices any inpatient behavioral, she states that patient is snappy with the family for a while, now after therefore her 's a

## 2017-08-14 NOTE — TELEPHONE ENCOUNTER
Dr. Mireya Wood,  Pt was unable to give urine sample, they tried multiple times, pt kept having bowel movements when giving sample, pt went in to retention and went to E.R, she was discharged a couple hours later and was told schedule f/u appt with you but pt h

## 2017-08-14 NOTE — TELEPHONE ENCOUNTER
Pt's daughter Manjudinah Esposito  is calling to get a sooner appt due Pt needing a F/U for ER visit.

## 2017-08-14 NOTE — TELEPHONE ENCOUNTER
Spoke to patient, she states that she is feeling better, she is urinating, moving bowel. She knows that she will see a physician in 2 days about her arm.   Will speak to her daughter to see if home health would be beneficial for the patient she may need ph

## 2017-08-15 ENCOUNTER — TELEPHONE (OUTPATIENT)
Dept: SURGERY | Facility: CLINIC | Age: 82
End: 2017-08-15

## 2017-08-15 NOTE — TELEPHONE ENCOUNTER
pts daughter, Amber Bautista called. pt was seen at [facility] ED on Friday 8/11/17 for urinary retention, she states no stent was placed. But was advised to follow up soon. Please advise.

## 2017-08-16 ENCOUNTER — OFFICE VISIT (OUTPATIENT)
Dept: INTERNAL MEDICINE CLINIC | Facility: CLINIC | Age: 82
End: 2017-08-16

## 2017-08-16 ENCOUNTER — TELEPHONE (OUTPATIENT)
Dept: SURGERY | Facility: CLINIC | Age: 82
End: 2017-08-16

## 2017-08-16 VITALS
BODY MASS INDEX: 20.83 KG/M2 | HEIGHT: 65 IN | WEIGHT: 125 LBS | SYSTOLIC BLOOD PRESSURE: 136 MMHG | DIASTOLIC BLOOD PRESSURE: 54 MMHG | HEART RATE: 66 BPM

## 2017-08-16 DIAGNOSIS — S51.012D LACERATION OF LEFT ELBOW, SUBSEQUENT ENCOUNTER: Primary | ICD-10-CM

## 2017-08-16 PROCEDURE — G0463 HOSPITAL OUTPT CLINIC VISIT: HCPCS | Performed by: INTERNAL MEDICINE

## 2017-08-16 PROCEDURE — 99213 OFFICE O/P EST LOW 20 MIN: CPT | Performed by: INTERNAL MEDICINE

## 2017-08-16 NOTE — TELEPHONE ENCOUNTER
Pt daughter and Pt are at the  to see about getting a appt . Pt was in ER 8/11/17 and was told to be seen asap.with Dr Devante Cueto .   Pt has a cath placed

## 2017-08-16 NOTE — PROGRESS NOTES
James Omalley is a 80year old female. Patient presents with:  Suture Removal    HPI:   Ms. Emily Selby presents this afternoon, accompanied by her daughter, for ER follow-up and suture removal.    On August 7, she fell and injured her left arm.   She had le 30 tablet Rfl: 1   AMLODIPINE BESYLATE 5 MG Oral Tab TAKE 1 TABLET BY MOUTH TWICE DAILY Disp: 180 tablet Rfl: 3   Glucose Blood (ACCU-CHEK ACTIVE) In Vitro Strip test by Intradermal route 3 times every day.  Please dispense for Contour machine Disp: 100 str Sling left arm. Ecchymosis upper left arm. Dressing left posterior elbow removed, with abrasion/laceration left posterior elbow with sutures intact. No erythema discharge or bleeding. No soft tissue swelling. All sutures were successfully removed.

## 2017-08-16 NOTE — PATIENT INSTRUCTIONS
Your sutures were removed today from your left elbow laceration. Apply a dry dressing during the day, removing it at night. Follow-up with Dr. Shanita Salinas and with Dr. Dominique Cr.

## 2017-08-17 ENCOUNTER — TELEPHONE (OUTPATIENT)
Dept: SURGERY | Facility: CLINIC | Age: 82
End: 2017-08-17

## 2017-08-17 ENCOUNTER — OFFICE VISIT (OUTPATIENT)
Dept: SURGERY | Facility: CLINIC | Age: 82
End: 2017-08-17

## 2017-08-17 VITALS
WEIGHT: 129 LBS | HEIGHT: 65 IN | SYSTOLIC BLOOD PRESSURE: 147 MMHG | TEMPERATURE: 98 F | BODY MASS INDEX: 21.49 KG/M2 | DIASTOLIC BLOOD PRESSURE: 70 MMHG | HEART RATE: 55 BPM

## 2017-08-17 DIAGNOSIS — R33.9 URINARY RETENTION: Primary | ICD-10-CM

## 2017-08-17 PROCEDURE — 99204 OFFICE O/P NEW MOD 45 MIN: CPT | Performed by: UROLOGY

## 2017-08-17 PROCEDURE — G0463 HOSPITAL OUTPT CLINIC VISIT: HCPCS | Performed by: UROLOGY

## 2017-08-17 NOTE — PROGRESS NOTES
SUBJECTIVE:  Mary Tello is a 80year old female who presents for a consultation at the request of, and a copy of this note will be sent to, Dr. Dominique Cr, for evaluation of  urinary retention. She states that the problem is unchanged.  Symptoms include [Other] [OTHER] Son       Social History: Smoking status: Never Smoker                                                              Smokeless tobacco: Never Used                      Alcohol use:  No                     REVIEW OF SYSTEMS:  RESPIRATORY:  Neg agree and will follow up accordingly.     Meds This Visit:  No prescriptions requested or ordered in this encounter    Imaging & Referrals:  None

## 2017-08-17 NOTE — TELEPHONE ENCOUNTER
Patients daughter states the RN's from [facility] would like a written order on medication that Dr Rubi Alvarez had prescribed. Medication is Miralax and is to be taken 2 times a week. Would like order to be faxed to [phone]. Please advise.  Thank you

## 2017-08-18 RX ORDER — POLYETHYLENE GLYCOL 3350 17 G/17G
17 POWDER, FOR SOLUTION ORAL
Qty: 8 EACH | Refills: 11 | Status: SHIPPED | OUTPATIENT
Start: 2017-08-21 | End: 2019-01-01

## 2017-08-18 NOTE — TELEPHONE ENCOUNTER
Phoned pt's daughter, Carter Lazo , back and received her identified voice mail. [facility]. [facility] appears Miralax was previously ordered by Rayetta Dubin, 10/29/2016. Clinic call back number provided.  Upon further review of chart, noted plan from juan manuel covarrubias

## 2017-08-18 NOTE — TELEPHONE ENCOUNTER
Thank you. Order faxed as requested below,confirmation of transmission received  and placed in completed fax bin.

## 2017-08-29 PROBLEM — S42.202D CLOSED FRACTURE OF PROXIMAL END OF LEFT HUMERUS WITH ROUTINE HEALING, UNSPECIFIED FRACTURE MORPHOLOGY, SUBSEQUENT ENCOUNTER: Status: ACTIVE | Noted: 2017-08-29

## 2017-08-31 RX ORDER — PEN NEEDLE, DIABETIC
NEEDLE, DISPOSABLE MISCELLANEOUS
Qty: 180 EACH | Refills: 3 | Status: SHIPPED | OUTPATIENT
Start: 2017-08-31 | End: 2019-03-23

## 2017-10-09 ENCOUNTER — LAB ENCOUNTER (OUTPATIENT)
Dept: LAB | Age: 82
End: 2017-10-09
Attending: INTERNAL MEDICINE
Payer: MEDICARE

## 2017-10-09 DIAGNOSIS — E11.3399 TYPE 2 DIABETES MELLITUS WITH MODERATE NONPROLIFERATIVE RETINOPATHY WITHOUT MACULAR EDEMA, WITH LONG-TERM CURRENT USE OF INSULIN, UNSPECIFIED LATERALITY (HCC): ICD-10-CM

## 2017-10-09 DIAGNOSIS — Z79.4 TYPE 2 DIABETES MELLITUS WITH MODERATE NONPROLIFERATIVE RETINOPATHY WITHOUT MACULAR EDEMA, WITH LONG-TERM CURRENT USE OF INSULIN, UNSPECIFIED LATERALITY (HCC): ICD-10-CM

## 2017-10-09 PROCEDURE — 85025 COMPLETE CBC W/AUTO DIFF WBC: CPT

## 2017-10-09 PROCEDURE — 36415 COLL VENOUS BLD VENIPUNCTURE: CPT

## 2017-10-09 PROCEDURE — 80053 COMPREHEN METABOLIC PANEL: CPT

## 2017-10-09 PROCEDURE — 83036 HEMOGLOBIN GLYCOSYLATED A1C: CPT

## 2017-10-13 ENCOUNTER — PATIENT OUTREACH (OUTPATIENT)
Dept: INTERNAL MEDICINE CLINIC | Facility: CLINIC | Age: 82
End: 2017-10-13

## 2017-10-13 ENCOUNTER — TELEPHONE (OUTPATIENT)
Dept: OTHER | Age: 82
End: 2017-10-13

## 2017-10-13 DIAGNOSIS — Z79.4 TYPE 2 DIABETES MELLITUS WITH OTHER OPHTHALMIC COMPLICATION, WITH LONG-TERM CURRENT USE OF INSULIN (HCC): Primary | ICD-10-CM

## 2017-10-13 DIAGNOSIS — I10 ESSENTIAL HYPERTENSION: ICD-10-CM

## 2017-10-13 DIAGNOSIS — E11.39 TYPE 2 DIABETES MELLITUS WITH OTHER OPHTHALMIC COMPLICATION, WITH LONG-TERM CURRENT USE OF INSULIN (HCC): Primary | ICD-10-CM

## 2017-10-13 NOTE — TELEPHONE ENCOUNTER
[facility] transfer to triage= see message below      Notes Recorded by Bryon Mo MD on 10/12/2017 at 8:35 PM CDT  Call patient stable blood count, stable kidney liver function test, diabetic test improved good news.

## 2017-10-14 NOTE — TELEPHONE ENCOUNTER
Spoke with patient (identified name and ) ,results reviewed and agrees with  plan.       Notes Recorded by Lora Castellanos MD on 10/12/2017 at 8:35 PM CDT  Call patient stable blood count, stable kidney liver function test, diabetic test improved good n

## 2017-10-18 ENCOUNTER — OFFICE VISIT (OUTPATIENT)
Dept: INTERNAL MEDICINE CLINIC | Facility: CLINIC | Age: 82
End: 2017-10-18

## 2017-10-18 ENCOUNTER — APPOINTMENT (OUTPATIENT)
Dept: LAB | Age: 82
End: 2017-10-18
Attending: INTERNAL MEDICINE
Payer: MEDICARE

## 2017-10-18 VITALS
BODY MASS INDEX: 21 KG/M2 | DIASTOLIC BLOOD PRESSURE: 73 MMHG | WEIGHT: 127 LBS | HEART RATE: 50 BPM | SYSTOLIC BLOOD PRESSURE: 137 MMHG

## 2017-10-18 DIAGNOSIS — E11.3393 TYPE 2 DIABETES MELLITUS WITH BOTH EYES AFFECTED BY MODERATE NONPROLIFERATIVE RETINOPATHY WITHOUT MACULAR EDEMA, WITH LONG-TERM CURRENT USE OF INSULIN (HCC): Primary | ICD-10-CM

## 2017-10-18 DIAGNOSIS — C91.10 CLL (CHRONIC LYMPHOCYTIC LEUKEMIA) (HCC): ICD-10-CM

## 2017-10-18 DIAGNOSIS — R00.1 BRADYCARDIA: ICD-10-CM

## 2017-10-18 DIAGNOSIS — R30.0 DYSURIA: ICD-10-CM

## 2017-10-18 DIAGNOSIS — E78.5 HYPERLIPIDEMIA LDL GOAL <100: ICD-10-CM

## 2017-10-18 DIAGNOSIS — Z79.4 TYPE 2 DIABETES MELLITUS WITH BOTH EYES AFFECTED BY MODERATE NONPROLIFERATIVE RETINOPATHY WITHOUT MACULAR EDEMA, WITH LONG-TERM CURRENT USE OF INSULIN (HCC): Primary | ICD-10-CM

## 2017-10-18 PROCEDURE — G0463 HOSPITAL OUTPT CLINIC VISIT: HCPCS | Performed by: INTERNAL MEDICINE

## 2017-10-18 PROCEDURE — 87086 URINE CULTURE/COLONY COUNT: CPT

## 2017-10-18 PROCEDURE — 99214 OFFICE O/P EST MOD 30 MIN: CPT | Performed by: INTERNAL MEDICINE

## 2017-10-18 PROCEDURE — 81003 URINALYSIS AUTO W/O SCOPE: CPT

## 2017-10-19 ENCOUNTER — PATIENT OUTREACH (OUTPATIENT)
Dept: INTERNAL MEDICINE CLINIC | Facility: CLINIC | Age: 82
End: 2017-10-19

## 2017-10-19 NOTE — PROGRESS NOTES
Discussed CCM program in detail w/patient. Patient has declined enrollment at this time. Will send CCM explanation letter.

## 2017-10-19 NOTE — PROGRESS NOTES
HPI:    Patient ID: Sonu Patino is a 80year old female.   Presents for follow-up of multiple medical conditions    HPI  Patient reports that she has been doing well, blood sugar diary created by nurses in assisted living, blood sugar runs in the [employer] NOVOLOG MIX 70/30 FLEXPEN (70-30) 100 UNIT/ML Subcutaneous Suspension Pen-injector Inject  15 units  SQ   Before breakfast  And  15  Units  Before  dinner Disp: 1 pen Rfl: 0   Timolol Maleate 0.5 % Ophthalmic Solution  Disp:  Rfl:    TRAVATAN Z 0.004 % O twice a day, will get labs in 3 4 months  Hyperlipidemia ldl goal <100 controlled, continue current dose of atorvastatin  Cll (chronic lymphocytic leukemia) (hcc) stable, continue to monitor counts follow-up with hematology as planned  Bradycardia asymptom

## 2017-11-01 ENCOUNTER — LAB ENCOUNTER (OUTPATIENT)
Dept: LAB | Age: 82
End: 2017-11-01
Attending: INTERNAL MEDICINE
Payer: MEDICARE

## 2017-11-01 DIAGNOSIS — C91.10 CLL (CHRONIC LYMPHOCYTIC LEUKEMIA) (HCC): ICD-10-CM

## 2017-11-01 PROCEDURE — 36415 COLL VENOUS BLD VENIPUNCTURE: CPT

## 2017-11-01 PROCEDURE — 85025 COMPLETE CBC W/AUTO DIFF WBC: CPT

## 2017-11-09 ENCOUNTER — OFFICE VISIT (OUTPATIENT)
Dept: HEMATOLOGY/ONCOLOGY | Facility: HOSPITAL | Age: 82
End: 2017-11-09
Attending: INTERNAL MEDICINE
Payer: MEDICARE

## 2017-11-09 ENCOUNTER — HOSPITAL ENCOUNTER (EMERGENCY)
Facility: HOSPITAL | Age: 82
Discharge: HOME | End: 2017-11-09
Attending: EMERGENCY MEDICINE
Payer: MEDICARE

## 2017-11-09 ENCOUNTER — APPOINTMENT (OUTPATIENT)
Dept: CT IMAGING | Facility: HOSPITAL | Age: 82
End: 2017-11-09
Attending: EMERGENCY MEDICINE
Payer: MEDICARE

## 2017-11-09 VITALS
WEIGHT: 125 LBS | OXYGEN SATURATION: 97 % | SYSTOLIC BLOOD PRESSURE: 168 MMHG | HEART RATE: 61 BPM | DIASTOLIC BLOOD PRESSURE: 88 MMHG | BODY MASS INDEX: 22 KG/M2 | TEMPERATURE: 98 F | RESPIRATION RATE: 16 BRPM

## 2017-11-09 VITALS
DIASTOLIC BLOOD PRESSURE: 44 MMHG | SYSTOLIC BLOOD PRESSURE: 174 MMHG | HEART RATE: 57 BPM | TEMPERATURE: 98 F | WEIGHT: 125 LBS | RESPIRATION RATE: 16 BRPM | BODY MASS INDEX: 22.15 KG/M2 | HEIGHT: 63 IN

## 2017-11-09 DIAGNOSIS — S09.90XA INJURY OF HEAD, INITIAL ENCOUNTER: Primary | ICD-10-CM

## 2017-11-09 DIAGNOSIS — C91.10 CLL (CHRONIC LYMPHOCYTIC LEUKEMIA) (HCC): Primary | ICD-10-CM

## 2017-11-09 DIAGNOSIS — S30.0XXA CONTUSION OF LOWER BACK, INITIAL ENCOUNTER: ICD-10-CM

## 2017-11-09 PROCEDURE — G0463 HOSPITAL OUTPT CLINIC VISIT: HCPCS | Performed by: INTERNAL MEDICINE

## 2017-11-09 PROCEDURE — 99284 EMERGENCY DEPT VISIT MOD MDM: CPT

## 2017-11-09 PROCEDURE — 70450 CT HEAD/BRAIN W/O DYE: CPT | Performed by: EMERGENCY MEDICINE

## 2017-11-09 PROCEDURE — 99213 OFFICE O/P EST LOW 20 MIN: CPT | Performed by: INTERNAL MEDICINE

## 2017-11-09 NOTE — ED INITIAL ASSESSMENT (HPI)
Patient was on her way to [facility] office, trip and fall going in and hit head. No loc. Denies anticoagulants.

## 2017-11-09 NOTE — ED PROVIDER NOTES
Patient Seen in: [facility] AND [facility] Emergency Department    History   Patient presents with:  Fall (musculoskeletal, neurologic)    Stated Complaint: Fall    HPI    81 y/o female on baby ASA who was getting out of the car today before seeing Dr. Judd Kc today Exam    Constitutional: Oriented to person, place, and time. Appears well-developed. No distress. Head: Normocephali small hematoma to the right posterior scalp area. No bleeding or laceration.   C.   Eyes: Conjunctivae are normal. Pupils are equal, rou parenchymal volume loss of a degree that is appropriate for age. No hydrocephalus, subarachnoid hemorrhage, or effacement of the basal cisterns is appreciated. There is no extra-axial fluid collection. Calcified meningiomas at the vertex are unchanged.  CER infarcts, and large vessel atherosclerosis. 4. Bilateral paramedian vertex calcified meningiomas, stable. 5. Lesser incidental findings as above. MDM     Discussed with Dr. Cherelle Maldonado by phone. Patient wants to go home at this time.   I encouraged her to

## 2017-11-09 NOTE — PROGRESS NOTES
JEEVAN     Christopher Parson is a 80year old female here for f/u of Cll (chronic lymphocytic leukemia) (hcc)  (primary encounter diagnosis)     Patient fell at the daughter's home today prior to coming here. Had trauma to the back of her head.   [facility] environmental allergies. Neurological: Negative for dizziness, weakness, light-headedness, numbness and headaches. Hematological: Negative for adenopathy. Bruises/bleeds easily. Psychiatric/Behavioral: Negative for sleep disturbance.  The patient is n medical management   • Lipid screening 4/15/2014   • Meningioma [facility])    • Stroke [facility]) 2002     Past Surgical History:  No date: CHOLECYSTECTOMY      Comment: cholecystitis  No date: COLECTOMY  9/22/2010: COLONOSCOPY  2010: [address] Mouth/Throat: Oropharynx is clear and moist.   Eyes: Conjunctivae and EOM are normal. Pupils are equal, round, and reactive to light. No scleral icterus. Neck: Normal range of motion. Neck supple. No tracheal deviation present. No thyromegaly present. Hours:  Component      Latest Ref Rng & Units 11/1/2017 10/9/2017 4/5/2017   WBC      4.0 - 11.0 K/UL 22.3 (H) 22.7 (H) 22.4 (H)   RBC      3.70 - 5.40 M/UL 4.33 4.26 4.15   Hemoglobin      12.0 - 16.0 g/dL 12.4 12.5 11.9 (L)   Hematocrit      35.0 - 48.0

## 2017-11-13 ENCOUNTER — OFFICE VISIT (OUTPATIENT)
Dept: INTERNAL MEDICINE CLINIC | Facility: CLINIC | Age: 82
End: 2017-11-13

## 2017-11-13 VITALS
DIASTOLIC BLOOD PRESSURE: 61 MMHG | HEART RATE: 60 BPM | WEIGHT: 123 LBS | BODY MASS INDEX: 22 KG/M2 | SYSTOLIC BLOOD PRESSURE: 150 MMHG

## 2017-11-13 DIAGNOSIS — S00.03XD CONTUSION OF SCALP, SUBSEQUENT ENCOUNTER: Primary | ICD-10-CM

## 2017-11-13 DIAGNOSIS — S30.0XXD CONTUSION OF SACRUM, SUBSEQUENT ENCOUNTER: ICD-10-CM

## 2017-11-13 PROCEDURE — G0463 HOSPITAL OUTPT CLINIC VISIT: HCPCS | Performed by: INTERNAL MEDICINE

## 2017-11-13 PROCEDURE — 99213 OFFICE O/P EST LOW 20 MIN: CPT | Performed by: INTERNAL MEDICINE

## 2017-11-14 NOTE — PROGRESS NOTES
HPI:    Patient ID: Varinder Lorenz is a 80year old female.   Presents for follow-up after a fall    HPI  Patient reports that several days ago she fell, she tried to close heavy car door lost balance and fell backwards, injured back of the head and fell tablet Rfl: 3   Glucose Blood (ACCU-CHEK ACTIVE) In Vitro Strip test by Intradermal route 3 times every day.  Please dispense for Contour machine Disp: 100 strip Rfl: 6   NOVOLOG MIX 70/30 FLEXPEN (70-30) 100 UNIT/ML Subcutaneous Suspension Pen-injector Inj

## 2017-12-14 ENCOUNTER — OFFICE VISIT (OUTPATIENT)
Dept: AUDIOLOGY | Facility: CLINIC | Age: 82
End: 2017-12-14

## 2017-12-14 ENCOUNTER — OFFICE VISIT (OUTPATIENT)
Dept: OTOLARYNGOLOGY | Facility: CLINIC | Age: 82
End: 2017-12-14

## 2017-12-14 VITALS
WEIGHT: 123 LBS | DIASTOLIC BLOOD PRESSURE: 60 MMHG | HEIGHT: 63 IN | SYSTOLIC BLOOD PRESSURE: 130 MMHG | TEMPERATURE: 99 F | BODY MASS INDEX: 21.79 KG/M2

## 2017-12-14 DIAGNOSIS — H61.23 BILATERAL IMPACTED CERUMEN: Primary | ICD-10-CM

## 2017-12-14 DIAGNOSIS — H90.3 SENSORINEURAL HEARING LOSS, BILATERAL: Primary | ICD-10-CM

## 2017-12-14 PROCEDURE — 92593 HEARING AID CHECK, BOTH EARS: CPT | Performed by: AUDIOLOGIST

## 2017-12-14 PROCEDURE — 69210 REMOVE IMPACTED EAR WAX UNI: CPT | Performed by: OTOLARYNGOLOGY

## 2017-12-14 NOTE — PROGRESS NOTES
Misa Martinez is a 80year old female. Patient presents with:  Ear Wax: both ears      HISTORY OF PRESENT ILLNESS    Patient presents for cerumen removal. No other complaints or concerns at this time    Social History    Marital status:   changes. Respiratory Negative Dyspnea and wheezing. Cardio Negative Chest pain, irregular heartbeat/palpitations and syncope. GI Negative Abdominal pain and diarrhea. Endocrine Negative Cold intolerance and heat intolerance.    Neuro Negative Tremor MG Oral Tab, TAKE 1 TABLET BY MOUTH NIGHTLY, Disp: 90 tablet, Rfl: 2  •  aspirin 81 MG Oral Tab, Take 81 mg by mouth daily. , Disp: , Rfl:   •  METOPROLOL SUCCINATE  MG Oral Tablet 24 Hr, TAKE 1 AND 1/2 TABLETS BY MOUTH DAILY, Disp: 45 tablet, Rfl: 6

## 2017-12-14 NOTE — PROGRESS NOTES
[facility]  7/18/1926  [id number]      Pt was seen for a six months follow-up. Aids were in good condition. Cleaned and suctioned aids and cShells. Changed wax traps and batteries.   Completed sanitizing and drying cycle

## 2018-01-09 RX ORDER — AMLODIPINE BESYLATE 5 MG/1
TABLET ORAL
Qty: 180 TABLET | Refills: 1 | Status: SHIPPED | OUTPATIENT
Start: 2018-01-09 | End: 2018-07-05

## 2018-01-24 ENCOUNTER — LAB ENCOUNTER (OUTPATIENT)
Dept: LAB | Age: 83
End: 2018-01-24
Attending: INTERNAL MEDICINE
Payer: MEDICARE

## 2018-01-24 DIAGNOSIS — Z79.4 TYPE 2 DIABETES MELLITUS WITH OTHER OPHTHALMIC COMPLICATION, WITH LONG-TERM CURRENT USE OF INSULIN (HCC): ICD-10-CM

## 2018-01-24 DIAGNOSIS — Z79.4 TYPE 2 DIABETES MELLITUS WITH BOTH EYES AFFECTED BY MODERATE NONPROLIFERATIVE RETINOPATHY WITHOUT MACULAR EDEMA, WITH LONG-TERM CURRENT USE OF INSULIN (HCC): ICD-10-CM

## 2018-01-24 DIAGNOSIS — E11.39 TYPE 2 DIABETES MELLITUS WITH OTHER OPHTHALMIC COMPLICATION, WITH LONG-TERM CURRENT USE OF INSULIN (HCC): ICD-10-CM

## 2018-01-24 DIAGNOSIS — E11.3393 TYPE 2 DIABETES MELLITUS WITH BOTH EYES AFFECTED BY MODERATE NONPROLIFERATIVE RETINOPATHY WITHOUT MACULAR EDEMA, WITH LONG-TERM CURRENT USE OF INSULIN (HCC): ICD-10-CM

## 2018-01-24 DIAGNOSIS — E78.5 HYPERLIPIDEMIA LDL GOAL <100: ICD-10-CM

## 2018-01-24 DIAGNOSIS — I10 ESSENTIAL HYPERTENSION: ICD-10-CM

## 2018-01-24 LAB
ALBUMIN SERPL BCP-MCNC: 3.9 G/DL (ref 3.5–4.8)
ALBUMIN/GLOB SERPL: 1.7 {RATIO} (ref 1–2)
ALP SERPL-CCNC: 78 U/L (ref 32–100)
ALT SERPL-CCNC: 17 U/L (ref 14–54)
ANION GAP SERPL CALC-SCNC: 10 MMOL/L (ref 0–18)
AST SERPL-CCNC: 23 U/L (ref 15–41)
BASOPHILS # BLD: 0.1 K/UL (ref 0–0.2)
BASOPHILS NFR BLD: 1 %
BILIRUB SERPL-MCNC: 0.7 MG/DL (ref 0.3–1.2)
BUN SERPL-MCNC: 28 MG/DL (ref 8–20)
BUN/CREAT SERPL: 28 (ref 10–20)
CALCIUM SERPL-MCNC: 9.5 MG/DL (ref 8.5–10.5)
CHLORIDE SERPL-SCNC: 103 MMOL/L (ref 95–110)
CHOLEST SERPL-MCNC: 114 MG/DL (ref 110–200)
CO2 SERPL-SCNC: 24 MMOL/L (ref 22–32)
CREAT SERPL-MCNC: 1 MG/DL (ref 0.5–1.5)
EOSINOPHIL # BLD: 0.3 K/UL (ref 0–0.7)
EOSINOPHIL NFR BLD: 2 %
ERYTHROCYTE [DISTWIDTH] IN BLOOD BY AUTOMATED COUNT: 15.4 % (ref 11–15)
GLOBULIN PLAS-MCNC: 2.3 G/DL (ref 2.5–3.7)
GLUCOSE SERPL-MCNC: 190 MG/DL (ref 70–99)
HBA1C MFR BLD: 6.7 % (ref 4–6)
HCT VFR BLD AUTO: 36.5 % (ref 35–48)
HDLC SERPL-MCNC: 50 MG/DL
HGB BLD-MCNC: 11.8 G/DL (ref 12–16)
LDLC SERPL CALC-MCNC: 46 MG/DL (ref 0–99)
LYMPHOCYTES # BLD: 7.3 K/UL (ref 1–4)
LYMPHOCYTES NFR BLD: 40 %
MCH RBC QN AUTO: 28.6 PG (ref 27–32)
MCHC RBC AUTO-ENTMCNC: 32.3 G/DL (ref 32–37)
MCV RBC AUTO: 88.6 FL (ref 80–100)
MONOCYTES # BLD: 1.3 K/UL (ref 0–1)
MONOCYTES NFR BLD: 7 %
NEUTROPHILS # BLD AUTO: 9.2 K/UL (ref 1.8–7.7)
NEUTROPHILS NFR BLD: 51 %
NONHDLC SERPL-MCNC: 64 MG/DL
OSMOLALITY UR CALC.SUM OF ELEC: 295 MOSM/KG (ref 275–295)
PLATELET # BLD AUTO: 238 K/UL (ref 140–400)
PMV BLD AUTO: 8.7 FL (ref 7.4–10.3)
POTASSIUM SERPL-SCNC: 4.5 MMOL/L (ref 3.3–5.1)
PROT SERPL-MCNC: 6.2 G/DL (ref 5.9–8.4)
RBC # BLD AUTO: 4.12 M/UL (ref 3.7–5.4)
SODIUM SERPL-SCNC: 137 MMOL/L (ref 136–144)
TRIGL SERPL-MCNC: 88 MG/DL (ref 1–149)
WBC # BLD AUTO: 18.2 K/UL (ref 4–11)

## 2018-01-24 PROCEDURE — 85025 COMPLETE CBC W/AUTO DIFF WBC: CPT

## 2018-01-24 PROCEDURE — 83036 HEMOGLOBIN GLYCOSYLATED A1C: CPT

## 2018-01-24 PROCEDURE — 80053 COMPREHEN METABOLIC PANEL: CPT

## 2018-01-24 PROCEDURE — 80061 LIPID PANEL: CPT

## 2018-01-24 PROCEDURE — 36415 COLL VENOUS BLD VENIPUNCTURE: CPT

## 2018-01-25 ENCOUNTER — TELEPHONE (OUTPATIENT)
Dept: OTHER | Age: 83
End: 2018-01-25

## 2018-01-25 NOTE — TELEPHONE ENCOUNTER
----- Message from Artis Quinteros MD sent at 1/25/2018 12:24 AM CST -----  Call patient or her daughter, that blood count is stable, touch of anemia which is new, normal  liver function test, kidney function decreased but stable, hemoglobin A1c 6.7 perfect

## 2018-01-26 NOTE — TELEPHONE ENCOUNTER
Spoke with patient (identified name and ), results reviewed and agrees with plan. Pt has appt pending 10 days from now.   Denies any issues with low blood sugars

## 2018-01-31 RX ORDER — ASPIRIN 81 MG
TABLET, DELAYED RELEASE (ENTERIC COATED) ORAL
Qty: 30 TABLET | Refills: 11 | Status: SHIPPED | OUTPATIENT
Start: 2018-01-31 | End: 2019-02-05

## 2018-02-06 RX ORDER — METOPROLOL SUCCINATE 100 MG/1
TABLET, EXTENDED RELEASE ORAL
Qty: 45 TABLET | Refills: 2 | Status: SHIPPED | OUTPATIENT
Start: 2018-02-06 | End: 2018-05-08

## 2018-02-07 NOTE — TELEPHONE ENCOUNTER
Refilled per protocol.     Hypertensive Medications  Protocol Criteria:  · Appointment scheduled in the past 6 months or in the next 3 months  · BMP or CMP in the past 12 months  · Creatinine result < 2  Recent Outpatient Visits            1 month ago Mohamud

## 2018-02-12 ENCOUNTER — OFFICE VISIT (OUTPATIENT)
Dept: INTERNAL MEDICINE CLINIC | Facility: CLINIC | Age: 83
End: 2018-02-12

## 2018-02-12 VITALS
WEIGHT: 120 LBS | DIASTOLIC BLOOD PRESSURE: 63 MMHG | HEART RATE: 50 BPM | SYSTOLIC BLOOD PRESSURE: 122 MMHG | RESPIRATION RATE: 16 BRPM | BODY MASS INDEX: 21 KG/M2

## 2018-02-12 DIAGNOSIS — Z79.4 TYPE 2 DIABETES MELLITUS WITH MODERATE NONPROLIFERATIVE RETINOPATHY WITHOUT MACULAR EDEMA, WITH LONG-TERM CURRENT USE OF INSULIN, UNSPECIFIED LATERALITY (HCC): ICD-10-CM

## 2018-02-12 DIAGNOSIS — I10 ESSENTIAL HYPERTENSION WITH GOAL BLOOD PRESSURE LESS THAN 140/90: ICD-10-CM

## 2018-02-12 DIAGNOSIS — C91.10 CHRONIC LYMPHOID LEUKEMIA (HCC): Primary | ICD-10-CM

## 2018-02-12 DIAGNOSIS — E11.3399 TYPE 2 DIABETES MELLITUS WITH MODERATE NONPROLIFERATIVE RETINOPATHY WITHOUT MACULAR EDEMA, WITH LONG-TERM CURRENT USE OF INSULIN, UNSPECIFIED LATERALITY (HCC): ICD-10-CM

## 2018-02-12 PROCEDURE — 99214 OFFICE O/P EST MOD 30 MIN: CPT | Performed by: INTERNAL MEDICINE

## 2018-02-12 PROCEDURE — G0463 HOSPITAL OUTPT CLINIC VISIT: HCPCS | Performed by: INTERNAL MEDICINE

## 2018-02-13 NOTE — PROGRESS NOTES
HPI:    Patient ID: Mary Tello is a 80year old female.   Presents for follow-up on several medical conditions    HPI  Patient reports that overall she has been doing fair, she lives in assisted living environment, states that does not like to be etienne Timolol Maleate 0.5 % Ophthalmic Solution  Disp:  Rfl:    TRAVATAN Z 0.004 % Ophthalmic Solution  Disp:  Rfl:    UNIFINE PENTIPS 31G X 6 MM Does not apply Misc USE TWICE DAILY WITH INSULIN Disp: 180 each Rfl: 3   PEG 3350 Oral Powd Pack Take 17 g by [place] unspecified laterality (hcc) control improved significantly, reviewed importance of avoiding hypoglycemia, regular meals, and avoid skipping foods.   And hypoglycemic episodes frequent will need to adjust insulin, stressed importance of eating high protein

## 2018-02-19 ENCOUNTER — TELEPHONE (OUTPATIENT)
Dept: OTHER | Age: 83
End: 2018-02-19

## 2018-02-19 ENCOUNTER — TELEPHONE (OUTPATIENT)
Dept: INTERNAL MEDICINE CLINIC | Facility: CLINIC | Age: 83
End: 2018-02-19

## 2018-02-19 DIAGNOSIS — Z79.4 TYPE 2 DIABETES MELLITUS WITHOUT COMPLICATION, WITH LONG-TERM CURRENT USE OF INSULIN (HCC): Primary | ICD-10-CM

## 2018-02-19 DIAGNOSIS — E11.9 TYPE 2 DIABETES MELLITUS WITHOUT COMPLICATION, WITH LONG-TERM CURRENT USE OF INSULIN (HCC): Primary | ICD-10-CM

## 2018-02-19 NOTE — TELEPHONE ENCOUNTER
Received call from Elisha Gonsalves nurse from [facility], requesting a refill on the glucose test strips. Rx sent to pharmacy based on protocol.      Diabetes Medications  Protocol Criteria:  · Appointment scheduled in the past 6 months or the next 3 months  ·

## 2018-02-20 NOTE — TELEPHONE ENCOUNTER
Attempted to call Tamara no answer,Pt should not  Take  Novolg today , staring tomorrow I recommended to decrease  Morning  Dose to 25 units instead of 30units  And  Continue  15 units  Before dinner ,request to report blood sugars in 1 weekonon new ins

## 2018-02-20 NOTE — TELEPHONE ENCOUNTER
Pt did not eat lunch today. At 1600 pt was found slouched over in chair and BS 62.  RN gave her 2 glucose tablets and 1 glass apple juice. Repeat glucose at 1640 was 120. And now at 1845 glucose 284.   RN wants to know if novolog insulin 15 units should

## 2018-02-20 NOTE — TELEPHONE ENCOUNTER
Namita Hassan, called and informed of physician response.   RN requesting Rx with orders be written and faxed to [facility] [phone]

## 2018-02-21 ENCOUNTER — TELEPHONE (OUTPATIENT)
Dept: INTERNAL MEDICINE CLINIC | Facility: CLINIC | Age: 83
End: 2018-02-21

## 2018-02-21 NOTE — TELEPHONE ENCOUNTER
Pt daughter dropped off forms for power of , forms placed in Dr. Devan Marroquin in [address] [place] [address].

## 2018-02-26 ENCOUNTER — TELEPHONE (OUTPATIENT)
Dept: OTHER | Age: 83
End: 2018-02-26

## 2018-02-26 NOTE — TELEPHONE ENCOUNTER
Spoke to Adam Rhodes at [place], advised to hold insulin this morning, she reports that patient is doing better with food intake.   She will receive the evening dose of insulin, advised to hold insulin in the morning if they are not sure what to do in the

## 2018-02-26 NOTE — TELEPHONE ENCOUNTER
Spoke with Divina Lowe at [facility] patient's blood sugar this morning was 83, then rechecked an hour and 20 minutes after breakfast and was 183.  Standing orders are to hold insulin if <100 and call MD. Divina Lowe asking if it ok to give patient'

## 2018-03-06 ENCOUNTER — TELEPHONE (OUTPATIENT)
Dept: OTHER | Age: 83
End: 2018-03-06

## 2018-03-06 NOTE — TELEPHONE ENCOUNTER
MERLIN Beyer from [facility] called and states that fasting BS this morning was 92, with order to hold insulin less than 100  But she gave the patient 25 units of Novolog , patient ate and the latest Of=247.  RN states that she faxed the 2 weeks BS result

## 2018-04-02 RX ORDER — ATORVASTATIN CALCIUM 10 MG/1
TABLET, FILM COATED ORAL
Qty: 90 TABLET | OUTPATIENT
Start: 2018-04-02

## 2018-04-03 NOTE — TELEPHONE ENCOUNTER
Cholesterol Medications  Protocol Criteria:  · Appointment scheduled in the past 12 months or in the next 3 months  · ALT & LDL on file in the past 12 months  · ALT result < 80  · LDL result <130   Recent Outpatient Visits            1 month ago Chronic ly

## 2018-04-10 RX ORDER — LISINOPRIL 40 MG/1
TABLET ORAL
Qty: 90 TABLET | Refills: 3 | Status: SHIPPED | OUTPATIENT
Start: 2018-04-10 | End: 2019-01-01

## 2018-04-18 ENCOUNTER — TELEPHONE (OUTPATIENT)
Dept: OTHER | Age: 83
End: 2018-04-18

## 2018-04-18 NOTE — TELEPHONE ENCOUNTER
[address] at number listed, left message. Dr. Rodriguez Parker, have you spoken to [facility]?

## 2018-04-18 NOTE — TELEPHONE ENCOUNTER
Received call from [place] from [facility] who reports patient's blood sugar was 325 mg/dl at 4pm and she administered 15 units of the Novolog at that time. Per Sola Clark at the [facility] [employer] [facility] [employer], Dr. Monique Abad will reach out to [facility] directly.  Kimberley Barrera

## 2018-04-18 NOTE — TELEPHONE ENCOUNTER
RN from [facility] and informing Dr Nuha Mobley that patient's Blood sugar this morning is 91, their order is to call Dr Zaria Fung if BS is less than 100 mg/dl.     DR RODRIGUEZ= RN from [place] is asking if you want her to give the patient Novolog 25 units af

## 2018-04-26 RX ORDER — ATORVASTATIN CALCIUM 10 MG/1
TABLET, FILM COATED ORAL
Qty: 90 TABLET | Refills: 3 | Status: SHIPPED | OUTPATIENT
Start: 2018-04-26 | End: 2019-01-29

## 2018-04-27 ENCOUNTER — TELEPHONE (OUTPATIENT)
Dept: INTERNAL MEDICINE CLINIC | Facility: CLINIC | Age: 83
End: 2018-04-27

## 2018-04-27 NOTE — TELEPHONE ENCOUNTER
Spoke to Vaishali Lovell [phone]  advisied  On new parameters  If  Blood sugar in am between   Give pt  Only  10 units  Of insulin, in the  Evening if  Blood sugar Between 70 and 100  Hold  Insulin. PT HAS  TENDENCY   toSKIP MEALS  Or  Eat  ' JUNK' food

## 2018-05-04 ENCOUNTER — LAB ENCOUNTER (OUTPATIENT)
Dept: LAB | Age: 83
End: 2018-05-04
Attending: INTERNAL MEDICINE
Payer: MEDICARE

## 2018-05-04 DIAGNOSIS — C91.10 CHRONIC LYMPHOID LEUKEMIA (HCC): ICD-10-CM

## 2018-05-04 DIAGNOSIS — E11.39: Primary | ICD-10-CM

## 2018-05-04 PROCEDURE — 85025 COMPLETE CBC W/AUTO DIFF WBC: CPT

## 2018-05-04 PROCEDURE — 36415 COLL VENOUS BLD VENIPUNCTURE: CPT

## 2018-05-04 PROCEDURE — 83036 HEMOGLOBIN GLYCOSYLATED A1C: CPT

## 2018-05-04 PROCEDURE — 80053 COMPREHEN METABOLIC PANEL: CPT

## 2018-05-09 ENCOUNTER — TELEPHONE (OUTPATIENT)
Dept: OTHER | Age: 83
End: 2018-05-09

## 2018-05-09 RX ORDER — METOPROLOL SUCCINATE 100 MG/1
TABLET, EXTENDED RELEASE ORAL
Qty: 45 TABLET | Refills: 3 | Status: SHIPPED | OUTPATIENT
Start: 2018-05-09 | End: 2018-05-16

## 2018-05-09 NOTE — TELEPHONE ENCOUNTER
LMTCB to pts daughter Charito Miners to pt and would like daughter to be informed. Please transfer to RN Triage. Thank you.

## 2018-05-09 NOTE — TELEPHONE ENCOUNTER
Spoke with daughter [place] and relayed EV message below--she verbalizes understanding and agreement and will discuss in more detail with EV at next OV 5/16/18. No further questions/concerns at this time.

## 2018-05-09 NOTE — TELEPHONE ENCOUNTER
----- Message from Pranay Griffith MD sent at 5/8/2018  8:00 AM CDT -----  Call patient that blood tests are stable, testing shows that she does not drink enough liquids, she should drink least 50 ounces of liquids a day.   I recommend to recheck blood test i

## 2018-05-13 RX ORDER — INSULIN ASPART 100 [IU]/ML
INJECTION, SUSPENSION SUBCUTANEOUS
Qty: 15 ML | Refills: 3 | Status: SHIPPED | OUTPATIENT
Start: 2018-05-13 | End: 2018-10-12

## 2018-05-16 ENCOUNTER — OFFICE VISIT (OUTPATIENT)
Dept: INTERNAL MEDICINE CLINIC | Facility: CLINIC | Age: 83
End: 2018-05-16

## 2018-05-16 VITALS
BODY MASS INDEX: 21 KG/M2 | SYSTOLIC BLOOD PRESSURE: 106 MMHG | WEIGHT: 120 LBS | DIASTOLIC BLOOD PRESSURE: 61 MMHG | HEART RATE: 45 BPM | RESPIRATION RATE: 16 BRPM

## 2018-05-16 DIAGNOSIS — E86.0 DEHYDRATION: ICD-10-CM

## 2018-05-16 DIAGNOSIS — E11.3399 TYPE 2 DIABETES MELLITUS WITH MODERATE NONPROLIFERATIVE RETINOPATHY WITHOUT MACULAR EDEMA, WITH LONG-TERM CURRENT USE OF INSULIN, UNSPECIFIED LATERALITY (HCC): ICD-10-CM

## 2018-05-16 DIAGNOSIS — Z79.4 TYPE 2 DIABETES MELLITUS WITH MODERATE NONPROLIFERATIVE RETINOPATHY WITHOUT MACULAR EDEMA, WITH LONG-TERM CURRENT USE OF INSULIN, UNSPECIFIED LATERALITY (HCC): ICD-10-CM

## 2018-05-16 DIAGNOSIS — C91.10 CLL (CHRONIC LYMPHOCYTIC LEUKEMIA) (HCC): ICD-10-CM

## 2018-05-16 DIAGNOSIS — I10 ESSENTIAL HYPERTENSION WITH GOAL BLOOD PRESSURE LESS THAN 140/90: Primary | ICD-10-CM

## 2018-05-16 PROBLEM — S42.202D CLOSED FRACTURE OF PROXIMAL END OF LEFT HUMERUS WITH ROUTINE HEALING, UNSPECIFIED FRACTURE MORPHOLOGY, SUBSEQUENT ENCOUNTER: Status: RESOLVED | Noted: 2017-08-29 | Resolved: 2018-05-16

## 2018-05-16 PROBLEM — S42.202A CLOSED FRACTURE OF PROXIMAL END OF LEFT HUMERUS, UNSPECIFIED FRACTURE MORPHOLOGY, INITIAL ENCOUNTER: Status: RESOLVED | Noted: 2017-08-08 | Resolved: 2018-05-16

## 2018-05-16 PROBLEM — R93.89 ABNORMAL CHEST X-RAY: Status: RESOLVED | Noted: 2017-01-11 | Resolved: 2018-05-16

## 2018-05-16 PROCEDURE — G0463 HOSPITAL OUTPT CLINIC VISIT: HCPCS | Performed by: INTERNAL MEDICINE

## 2018-05-16 PROCEDURE — 99214 OFFICE O/P EST MOD 30 MIN: CPT | Performed by: INTERNAL MEDICINE

## 2018-05-16 RX ORDER — METOPROLOL SUCCINATE 100 MG/1
100 TABLET, EXTENDED RELEASE ORAL DAILY
Qty: 90 TABLET | Refills: 3 | Status: SHIPPED | OUTPATIENT
Start: 2018-05-16 | End: 2019-03-19

## 2018-05-17 NOTE — PROGRESS NOTES
HPI:    Patient ID: Varinder Lorenz is a 80year old female. Presents for follow-up on multiple medical conditions.     HPI  Patient states that she has been feeling fair, she reports occasional episodes of hypoglycemia, usually happens if she does not e Oral Powd Pack Take 17 g by mouth twice a week. Disp: 8 each Rfl: 11   aspirin 81 MG Oral Tab Take 81 mg by mouth daily.  Disp:  Rfl:    TraMADol HCl 50 MG Oral Tab TAKE 1 TAB PO EVERY 12 HOURS AS NEEDED FOR  PAIN Disp: 30 tablet Rfl: 1   NOVOLOG MIX 70/30 Neurological: She is alert and oriented to person, place, and time. No cranial nerve deficit. Gait normal.   Skin: Skin is warm and dry. Rash noted. Seborrheic dermatitis rash on the forehead and nose   Psychiatric: She has a normal mood and affect.  He

## 2018-05-22 ENCOUNTER — MED REC SCAN ONLY (OUTPATIENT)
Dept: INTERNAL MEDICINE CLINIC | Facility: CLINIC | Age: 83
End: 2018-05-22

## 2018-06-15 DIAGNOSIS — E11.9 TYPE 2 DIABETES MELLITUS WITHOUT COMPLICATION, WITH LONG-TERM CURRENT USE OF INSULIN (HCC): ICD-10-CM

## 2018-06-15 DIAGNOSIS — Z79.4 TYPE 2 DIABETES MELLITUS WITHOUT COMPLICATION, WITH LONG-TERM CURRENT USE OF INSULIN (HCC): ICD-10-CM

## 2018-06-15 NOTE — TELEPHONE ENCOUNTER
Glucose Blood In Vitro Strip    REFILLS 3  Sig :  test by Intradermal route 3 times every day.  Please dispense for Contour machine

## 2018-06-16 NOTE — TELEPHONE ENCOUNTER
Refill Protocol Appointment Criteria  · Appointment scheduled in the past 12 months or in the next 3 months  Recent Outpatient Visits            1 month ago Essential hypertension with goal blood pressure less than 140/90    [facility], [address] [place] [address]

## 2018-06-21 ENCOUNTER — OFFICE VISIT (OUTPATIENT)
Dept: AUDIOLOGY | Facility: CLINIC | Age: 83
End: 2018-06-21

## 2018-06-21 ENCOUNTER — OFFICE VISIT (OUTPATIENT)
Dept: OTOLARYNGOLOGY | Facility: CLINIC | Age: 83
End: 2018-06-21

## 2018-06-21 VITALS
TEMPERATURE: 98 F | HEIGHT: 65 IN | WEIGHT: 125 LBS | DIASTOLIC BLOOD PRESSURE: 63 MMHG | SYSTOLIC BLOOD PRESSURE: 142 MMHG | BODY MASS INDEX: 20.83 KG/M2

## 2018-06-21 DIAGNOSIS — H90.3 SENSORINEURAL HEARING LOSS, BILATERAL: Primary | ICD-10-CM

## 2018-06-21 DIAGNOSIS — H61.23 BILATERAL IMPACTED CERUMEN: Primary | ICD-10-CM

## 2018-06-21 PROCEDURE — 92593 HEARING AID CHECK, BOTH EARS: CPT | Performed by: AUDIOLOGIST

## 2018-06-21 PROCEDURE — 69210 REMOVE IMPACTED EAR WAX UNI: CPT | Performed by: OTOLARYNGOLOGY

## 2018-06-21 NOTE — PROGRESS NOTES
Toro Cannon is a 80year old female.   Patient presents with:  Ear Problem: Bilateral ear cleaning, no pain      HISTORY OF PRESENT ILLNESS    Patient presents for cerumen removal. No other complaints or concerns at this time    Social History    Kiesha Walters Blurred vision and vision changes. Respiratory Negative Dyspnea and wheezing. Cardio Negative Chest pain, irregular heartbeat/palpitations and syncope. GI Negative Abdominal pain and diarrhea.    Endocrine Negative Cold intolerance and heat intoleranc tablet (100 mg total) by mouth daily.  Cancel previous  sig, Disp: 90 tablet, Rfl: 3  •  NOVOLOG MIX 70/30 FLEXPEN (70-30) 100 UNIT/ML Subcutaneous Suspension Pen-injector, INJECT 30 UNITS SQ BEFORE BREAKFAST AND 15 UNITS BEFORE DINNER, Disp: 15 mL, Rfl: 3

## 2018-06-21 NOTE — PROGRESS NOTES
HEARING AID FOLLOW-UP    Jadielsridharbeatrice Jenkins  7/18/1926  [id number]    Patient is here for warranty check. Sent aids for in-warranty repair - warranty expires 9/2/18. Dispensed clinic loaner aids. Call pt and daughter, Brodie Payment when aids are back.   Front pi

## 2018-06-29 ENCOUNTER — AUDIOLOGY DOCUMENTATION (OUTPATIENT)
Dept: AUDIOLOGY | Facility: CLINIC | Age: 83
End: 2018-06-29

## 2018-06-29 NOTE — TELEPHONE ENCOUNTER
Alla Castelan is a 80year old female     No ref. provider found   7/18/1926  [id number]        Pt's right and left hearings aid are back from warranty check.    replaced the receivers, tubes, electronics, housing, wax guards, and wi

## 2018-07-03 ENCOUNTER — OFFICE VISIT (OUTPATIENT)
Dept: INTERNAL MEDICINE CLINIC | Facility: CLINIC | Age: 83
End: 2018-07-03

## 2018-07-03 VITALS
WEIGHT: 119 LBS | SYSTOLIC BLOOD PRESSURE: 149 MMHG | BODY MASS INDEX: 20 KG/M2 | DIASTOLIC BLOOD PRESSURE: 47 MMHG | RESPIRATION RATE: 16 BRPM | HEART RATE: 45 BPM

## 2018-07-03 DIAGNOSIS — E11.3399 TYPE 2 DIABETES MELLITUS WITH MODERATE NONPROLIFERATIVE RETINOPATHY WITHOUT MACULAR EDEMA, WITH LONG-TERM CURRENT USE OF INSULIN, UNSPECIFIED LATERALITY (HCC): ICD-10-CM

## 2018-07-03 DIAGNOSIS — I10 ESSENTIAL HYPERTENSION WITH GOAL BLOOD PRESSURE LESS THAN 140/90: ICD-10-CM

## 2018-07-03 DIAGNOSIS — R63.4 WEIGHT LOSS: Primary | ICD-10-CM

## 2018-07-03 DIAGNOSIS — Z79.4 TYPE 2 DIABETES MELLITUS WITH MODERATE NONPROLIFERATIVE RETINOPATHY WITHOUT MACULAR EDEMA, WITH LONG-TERM CURRENT USE OF INSULIN, UNSPECIFIED LATERALITY (HCC): ICD-10-CM

## 2018-07-03 PROCEDURE — G0463 HOSPITAL OUTPT CLINIC VISIT: HCPCS | Performed by: INTERNAL MEDICINE

## 2018-07-03 PROCEDURE — 99213 OFFICE O/P EST LOW 20 MIN: CPT | Performed by: INTERNAL MEDICINE

## 2018-07-05 RX ORDER — AMLODIPINE BESYLATE 5 MG/1
TABLET ORAL
Qty: 180 TABLET | Refills: 1 | Status: SHIPPED | OUTPATIENT
Start: 2018-07-05 | End: 2018-12-27

## 2018-07-08 NOTE — PROGRESS NOTES
HPI:    Patient ID: Luigi Young is a 80year old female. Presents for follow-up of diabetes, hypertension. HPI   Patient is in the office accompanied by his son and daughter-in-law who visiting patient from different state.   She reports that she Oral Tab EC TAKE 1 TABLET BY MOUTH DAILY Disp: 30 tablet Rfl: 11   UNIFINE PENTIPS 31G X 6 MM Does not apply Misc USE TWICE DAILY WITH INSULIN Disp: 180 each Rfl: 3   PEG 3350 Oral Powd Pack Take 17 g by mouth twice a week.  Disp: 8 each Rfl: 11   aspirin 8 cranial nerve deficit or motor deficit. Gait normal.   Psychiatric: She has a normal mood and affect.  Her behavior is normal. Judgment normal.          ASSESSMENT/PLAN:   (R63.4) Weight loss  (primary encounter diagnosis)  Plan advised that family finds ou

## 2018-08-03 ENCOUNTER — TELEPHONE (OUTPATIENT)
Dept: INTERNAL MEDICINE CLINIC | Facility: CLINIC | Age: 83
End: 2018-08-03

## 2018-08-22 ENCOUNTER — LAB ENCOUNTER (OUTPATIENT)
Dept: LAB | Age: 83
End: 2018-08-22
Attending: INTERNAL MEDICINE
Payer: MEDICARE

## 2018-08-22 DIAGNOSIS — E11.3399 TYPE 2 DIABETES MELLITUS WITH MODERATE NONPROLIFERATIVE RETINOPATHY WITHOUT MACULAR EDEMA, WITH LONG-TERM CURRENT USE OF INSULIN, UNSPECIFIED LATERALITY (HCC): ICD-10-CM

## 2018-08-22 DIAGNOSIS — E86.0 DEHYDRATION: ICD-10-CM

## 2018-08-22 DIAGNOSIS — I10 ESSENTIAL HYPERTENSION WITH GOAL BLOOD PRESSURE LESS THAN 140/90: ICD-10-CM

## 2018-08-22 DIAGNOSIS — Z79.4 TYPE 2 DIABETES MELLITUS WITH MODERATE NONPROLIFERATIVE RETINOPATHY WITHOUT MACULAR EDEMA, WITH LONG-TERM CURRENT USE OF INSULIN, UNSPECIFIED LATERALITY (HCC): ICD-10-CM

## 2018-08-22 LAB
ALBUMIN SERPL BCP-MCNC: 4 G/DL (ref 3.5–4.8)
ALBUMIN/GLOB SERPL: 1.6 {RATIO} (ref 1–2)
ALP SERPL-CCNC: 77 U/L (ref 32–100)
ALT SERPL-CCNC: 19 U/L (ref 14–54)
ANION GAP SERPL CALC-SCNC: 8 MMOL/L (ref 0–18)
AST SERPL-CCNC: 30 U/L (ref 15–41)
BASOPHILS # BLD: 0.1 K/UL (ref 0–0.2)
BASOPHILS NFR BLD: 1 %
BILIRUB SERPL-MCNC: 0.8 MG/DL (ref 0.3–1.2)
BUN SERPL-MCNC: 21 MG/DL (ref 8–20)
BUN/CREAT SERPL: 20.6 (ref 10–20)
CALCIUM SERPL-MCNC: 9.5 MG/DL (ref 8.5–10.5)
CHLORIDE SERPL-SCNC: 105 MMOL/L (ref 95–110)
CO2 SERPL-SCNC: 26 MMOL/L (ref 22–32)
CREAT SERPL-MCNC: 1.02 MG/DL (ref 0.5–1.5)
EOSINOPHIL # BLD: 0.3 K/UL (ref 0–0.7)
EOSINOPHIL NFR BLD: 2 %
ERYTHROCYTE [DISTWIDTH] IN BLOOD BY AUTOMATED COUNT: 14.9 % (ref 11–15)
GLOBULIN PLAS-MCNC: 2.5 G/DL (ref 2.5–3.7)
GLUCOSE SERPL-MCNC: 169 MG/DL (ref 70–99)
HBA1C MFR BLD: 7.3 % (ref 4–6)
HCT VFR BLD AUTO: 38.6 % (ref 35–48)
HGB BLD-MCNC: 12.5 G/DL (ref 12–16)
LYMPHOCYTES # BLD: 8.1 K/UL (ref 1–4)
LYMPHOCYTES NFR BLD: 40 %
MCH RBC QN AUTO: 29.3 PG (ref 27–32)
MCHC RBC AUTO-ENTMCNC: 32.4 G/DL (ref 32–37)
MCV RBC AUTO: 90.3 FL (ref 80–100)
MONOCYTES # BLD: 1.2 K/UL (ref 0–1)
MONOCYTES NFR BLD: 6 %
NEUTROPHILS # BLD AUTO: 10.6 K/UL (ref 1.8–7.7)
NEUTROPHILS NFR BLD: 52 %
OSMOLALITY UR CALC.SUM OF ELEC: 295 MOSM/KG (ref 275–295)
PATIENT FASTING: YES
PLATELET # BLD AUTO: 230 K/UL (ref 140–400)
PMV BLD AUTO: 8.1 FL (ref 7.4–10.3)
POTASSIUM SERPL-SCNC: 4.7 MMOL/L (ref 3.3–5.1)
PROT SERPL-MCNC: 6.5 G/DL (ref 5.9–8.4)
RBC # BLD AUTO: 4.27 M/UL (ref 3.7–5.4)
SODIUM SERPL-SCNC: 139 MMOL/L (ref 136–144)
WBC # BLD AUTO: 20.3 K/UL (ref 4–11)

## 2018-08-22 PROCEDURE — 85025 COMPLETE CBC W/AUTO DIFF WBC: CPT

## 2018-08-22 PROCEDURE — 80053 COMPREHEN METABOLIC PANEL: CPT

## 2018-08-22 PROCEDURE — 36415 COLL VENOUS BLD VENIPUNCTURE: CPT

## 2018-08-22 PROCEDURE — 83036 HEMOGLOBIN GLYCOSYLATED A1C: CPT

## 2018-09-04 ENCOUNTER — OFFICE VISIT (OUTPATIENT)
Dept: INTERNAL MEDICINE CLINIC | Facility: CLINIC | Age: 83
End: 2018-09-04
Payer: MEDICARE

## 2018-09-04 VITALS
HEART RATE: 49 BPM | BODY MASS INDEX: 20 KG/M2 | SYSTOLIC BLOOD PRESSURE: 149 MMHG | RESPIRATION RATE: 16 BRPM | WEIGHT: 120 LBS | DIASTOLIC BLOOD PRESSURE: 64 MMHG

## 2018-09-04 DIAGNOSIS — R80.9 TYPE 2 DIABETES MELLITUS WITH MICROALBUMINURIA, WITH LONG-TERM CURRENT USE OF INSULIN (HCC): ICD-10-CM

## 2018-09-04 DIAGNOSIS — E11.29 TYPE 2 DIABETES MELLITUS WITH MICROALBUMINURIA, WITH LONG-TERM CURRENT USE OF INSULIN (HCC): ICD-10-CM

## 2018-09-04 DIAGNOSIS — Z79.4 TYPE 2 DIABETES MELLITUS WITH MICROALBUMINURIA, WITH LONG-TERM CURRENT USE OF INSULIN (HCC): ICD-10-CM

## 2018-09-04 DIAGNOSIS — C91.10 CHRONIC LYMPHOID LEUKEMIA (HCC): ICD-10-CM

## 2018-09-04 DIAGNOSIS — I10 ESSENTIAL HYPERTENSION WITH GOAL BLOOD PRESSURE LESS THAN 140/90: Primary | ICD-10-CM

## 2018-09-04 DIAGNOSIS — R63.4 WEIGHT LOSS: ICD-10-CM

## 2018-09-04 PROCEDURE — 99214 OFFICE O/P EST MOD 30 MIN: CPT | Performed by: INTERNAL MEDICINE

## 2018-09-04 PROCEDURE — G0463 HOSPITAL OUTPT CLINIC VISIT: HCPCS | Performed by: INTERNAL MEDICINE

## 2018-09-07 NOTE — PROGRESS NOTES
HPI:    Patient ID: Roberta Wade is a 80year old female.   Presents for follow-up of diabetes, hypertension, weight problems    HPI  Patient reports that she has been feeling fair, trying hard to eat regularly to maintain weight, not always drinks lulu ASPIRIN EC LOW DOSE 81 MG Oral Tab EC TAKE 1 TABLET BY MOUTH DAILY Disp: 30 tablet Rfl: 11   aspirin 81 MG Oral Tab Take 81 mg by mouth daily.  Disp:  Rfl:    TraMADol HCl 50 MG Oral Tab TAKE 1 TAB PO EVERY 12 HOURS AS NEEDED FOR  PAIN Disp: 30 tablet Rfl control acceptable, continue current insulin doses, continue check blood sugar twice a day, encourage patient not to skip meals to avoid hypoglycemia  Essential hypertension with goal blood pressure less than 140/90 continue current management  Weight loss

## 2018-10-12 RX ORDER — INSULIN ASPART 100 [IU]/ML
INJECTION, SUSPENSION SUBCUTANEOUS
Qty: 15 ML | Refills: 3 | Status: SHIPPED | OUTPATIENT
Start: 2018-10-12 | End: 2019-02-11

## 2018-10-13 NOTE — TELEPHONE ENCOUNTER
Diabetes Medications  Protocol Criteria:  · Appointment scheduled in the past 6 months or the next 3 months  · A1C < 7.5 in the past 6 months  · Creatinine in the past 12 months  · Creatinine result < 1.5   Recent Outpatient Visits            1 month ago E

## 2018-11-07 ENCOUNTER — TELEPHONE (OUTPATIENT)
Dept: INTERNAL MEDICINE CLINIC | Facility: CLINIC | Age: 83
End: 2018-11-07

## 2018-11-07 NOTE — TELEPHONE ENCOUNTER
Spoke with Chun Perez, confirmed that DR. Li madrid is patients podiatrist.  Completed form mailed to Dr. Nik Garcia.

## 2018-11-07 NOTE — TELEPHONE ENCOUNTER
Daughter Amber Meredithlacie returning a call, placed on hold while trying to call Merced Nicole from the  office [place],spoke with Merced Nicole and transferred the call.

## 2018-11-07 NOTE — TELEPHONE ENCOUNTER
Left message to call back transfer to [facility]. Need to confirm pt.`s Podiatrist name is Shekhar Yoo.

## 2018-11-08 ENCOUNTER — APPOINTMENT (OUTPATIENT)
Dept: HEMATOLOGY/ONCOLOGY | Facility: HOSPITAL | Age: 83
End: 2018-11-08
Attending: INTERNAL MEDICINE
Payer: MEDICARE

## 2018-11-09 ENCOUNTER — TELEPHONE (OUTPATIENT)
Dept: HEMATOLOGY/ONCOLOGY | Facility: HOSPITAL | Age: 83
End: 2018-11-09

## 2018-11-09 NOTE — TELEPHONE ENCOUNTER
Returned phone call and left message that last CBC was 8/22/18 and does not need redrawn for appointment on 11/16/18. Instructed to call [phone] if has further questions or concerns.

## 2018-11-09 NOTE — TELEPHONE ENCOUNTER
Kristie Mcfarland would like to know if Giovanni Likens needs labs before he scheduled visit on Friday 11/16/2018.  Kristie Mcfarland can be reached at [phone] Please Advise

## 2018-11-15 DIAGNOSIS — C91.10 CHRONIC LYMPHOID LEUKEMIA (HCC): Primary | ICD-10-CM

## 2018-11-16 ENCOUNTER — OFFICE VISIT (OUTPATIENT)
Dept: HEMATOLOGY/ONCOLOGY | Facility: HOSPITAL | Age: 83
End: 2018-11-16
Attending: INTERNAL MEDICINE
Payer: MEDICARE

## 2018-11-16 VITALS
WEIGHT: 118 LBS | TEMPERATURE: 98 F | DIASTOLIC BLOOD PRESSURE: 47 MMHG | RESPIRATION RATE: 18 BRPM | SYSTOLIC BLOOD PRESSURE: 142 MMHG | HEART RATE: 48 BPM | HEIGHT: 65 IN | BODY MASS INDEX: 19.66 KG/M2

## 2018-11-16 DIAGNOSIS — C91.10 CHRONIC LYMPHOID LEUKEMIA (HCC): ICD-10-CM

## 2018-11-16 PROCEDURE — 99213 OFFICE O/P EST LOW 20 MIN: CPT | Performed by: INTERNAL MEDICINE

## 2018-11-16 NOTE — PROGRESS NOTES
HPI     Bellonatanika Kelley is a 80year old female here for f/u of Chronic lymphoid leukemia (hcc)     Denies fevers or chills, no night sweats. Wt stable. States she eats well. No other complaints.      Has not noted enlarged LNs    States has a DAILY WITH INSULIN Disp: 180 each Rfl: 3   PEG 3350 Oral Powd Pack Take 17 g by mouth twice a week. Disp: 8 each Rfl: 11   aspirin 81 MG Oral Tab Take 81 mg by mouth daily.  Disp:  Rfl:    TraMADol HCl 50 MG Oral Tab TAKE 1 TAB PO EVERY 12 HOURS AS NEEDED F Alcohol/week: 0.0 oz      Drug use: No      Sexual activity: Not on file    Other Topics      Concerns:         Service: Not Asked        Blood Transfusions: Not Asked        Caffeine Concern: Yes          1.5 cups coffee, tea daily        Occupati Head (right side): No submental, no submandibular, no preauricular, no posterior auricular and no occipital adenopathy present. Head (left side): Submandibular (1.5 - 2 cm LN.) adenopathy present.  No submental, no preauricular, no posterior auric 9.2 (H) 10.0 (H)   Lymphocytes Absolute      1.0 - 4.0 K/UL 8.1 (H) 8.2 (H) 7.3 (H) 10.9 (H)   Monocytes Absolute      0.0 - 1.0 K/UL 1.2 (H) 0.9 1.3 (H) 1.1 (H)   Eosinophils Absolute      0.0 - 0.7 K/UL 0.3 0.2 0.3 0.2   Basophils Absolute      0.0 - 0.2

## 2018-11-28 ENCOUNTER — LAB ENCOUNTER (OUTPATIENT)
Dept: LAB | Age: 83
End: 2018-11-28
Attending: INTERNAL MEDICINE
Payer: MEDICARE

## 2018-11-28 DIAGNOSIS — E11.29 TYPE 2 DIABETES MELLITUS WITH MICROALBUMINURIA, WITH LONG-TERM CURRENT USE OF INSULIN (HCC): ICD-10-CM

## 2018-11-28 DIAGNOSIS — R80.9 TYPE 2 DIABETES MELLITUS WITH MICROALBUMINURIA, WITH LONG-TERM CURRENT USE OF INSULIN (HCC): ICD-10-CM

## 2018-11-28 DIAGNOSIS — C91.10 CHRONIC LYMPHOID LEUKEMIA (HCC): ICD-10-CM

## 2018-11-28 DIAGNOSIS — Z79.4 TYPE 2 DIABETES MELLITUS WITH MICROALBUMINURIA, WITH LONG-TERM CURRENT USE OF INSULIN (HCC): ICD-10-CM

## 2018-11-28 PROCEDURE — 83036 HEMOGLOBIN GLYCOSYLATED A1C: CPT

## 2018-11-28 PROCEDURE — 36415 COLL VENOUS BLD VENIPUNCTURE: CPT

## 2018-11-28 PROCEDURE — 80053 COMPREHEN METABOLIC PANEL: CPT

## 2018-11-28 PROCEDURE — 85025 COMPLETE CBC W/AUTO DIFF WBC: CPT

## 2018-12-04 ENCOUNTER — OFFICE VISIT (OUTPATIENT)
Dept: INTERNAL MEDICINE CLINIC | Facility: CLINIC | Age: 83
End: 2018-12-04
Payer: MEDICARE

## 2018-12-04 ENCOUNTER — TELEPHONE (OUTPATIENT)
Dept: INTERNAL MEDICINE CLINIC | Facility: CLINIC | Age: 83
End: 2018-12-04

## 2018-12-04 VITALS
BODY MASS INDEX: 19 KG/M2 | HEART RATE: 42 BPM | WEIGHT: 115 LBS | SYSTOLIC BLOOD PRESSURE: 149 MMHG | DIASTOLIC BLOOD PRESSURE: 53 MMHG

## 2018-12-04 DIAGNOSIS — E11.3493 TYPE 2 DIABETES MELLITUS WITH BOTH EYES AFFECTED BY SEVERE NONPROLIFERATIVE RETINOPATHY WITHOUT MACULAR EDEMA, WITH LONG-TERM CURRENT USE OF INSULIN (HCC): Primary | ICD-10-CM

## 2018-12-04 DIAGNOSIS — R63.4 WEIGHT LOSS: ICD-10-CM

## 2018-12-04 DIAGNOSIS — E78.00 PURE HYPERCHOLESTEROLEMIA: ICD-10-CM

## 2018-12-04 DIAGNOSIS — R91.8 PULMONARY NODULES: ICD-10-CM

## 2018-12-04 DIAGNOSIS — Z79.4 TYPE 2 DIABETES MELLITUS WITH BOTH EYES AFFECTED BY SEVERE NONPROLIFERATIVE RETINOPATHY WITHOUT MACULAR EDEMA, WITH LONG-TERM CURRENT USE OF INSULIN (HCC): Primary | ICD-10-CM

## 2018-12-04 DIAGNOSIS — Z85.038 HISTORY OF COLON CANCER IN ADULTHOOD: ICD-10-CM

## 2018-12-04 DIAGNOSIS — I10 ESSENTIAL HYPERTENSION WITH GOAL BLOOD PRESSURE LESS THAN 140/90: ICD-10-CM

## 2018-12-04 PROCEDURE — G0463 HOSPITAL OUTPT CLINIC VISIT: HCPCS | Performed by: INTERNAL MEDICINE

## 2018-12-04 PROCEDURE — 99214 OFFICE O/P EST MOD 30 MIN: CPT | Performed by: INTERNAL MEDICINE

## 2018-12-04 NOTE — TELEPHONE ENCOUNTER
Pt's daughter is calling would like to know if pt is going to need to do the oral contrast for test she has on CT.

## 2018-12-07 NOTE — TELEPHONE ENCOUNTER
Left message left message for Liat Robles that patient cannot have oral contrast but no intravenous contrast because of kidney insufficiency

## 2018-12-09 NOTE — PROGRESS NOTES
HPI:    Patient ID: Madison Ramírez is a 80year old female. Presents for follow-up of multiple medical conditions.     HPI  Patient reports that she has been feeling fair, she reports no hypoglycemic episodes, patient seen in the presence of her scott % Ophthalmic Solution Apply 1 drop to eye nightly. Disp:  Rfl:    Metoprolol Succinate  MG Oral Tablet 24 Hr Take 1 tablet (100 mg total) by mouth daily.  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

## 2018-12-27 RX ORDER — AMLODIPINE BESYLATE 5 MG/1
TABLET ORAL
Qty: 180 TABLET | Refills: 0 | Status: SHIPPED | OUTPATIENT
Start: 2018-12-27 | End: 2019-01-01

## 2018-12-27 NOTE — TELEPHONE ENCOUNTER
Hypertensive Medications  Protocol Criteria:  · Appointment scheduled in the past 6 months or in the next 3 months  · BMP or CMP in the past 12 months  · Creatinine result < 2  Recent Outpatient Visits            3 weeks ago Type 2 diabetes mellitus with b

## 2019-01-01 ENCOUNTER — LAB ENCOUNTER (OUTPATIENT)
Dept: LAB | Age: 84
End: 2019-01-01
Attending: INTERNAL MEDICINE
Payer: MEDICARE

## 2019-01-01 ENCOUNTER — OFFICE VISIT (OUTPATIENT)
Dept: INTERNAL MEDICINE CLINIC | Facility: CLINIC | Age: 84
End: 2019-01-01
Payer: MEDICARE

## 2019-01-01 ENCOUNTER — TELEPHONE (OUTPATIENT)
Dept: CARDIOLOGY | Age: 84
End: 2019-01-01

## 2019-01-01 ENCOUNTER — APPOINTMENT (OUTPATIENT)
Dept: GENERAL RADIOLOGY | Facility: HOSPITAL | Age: 84
DRG: 242 | End: 2019-01-01
Attending: INTERNAL MEDICINE
Payer: MEDICARE

## 2019-01-01 ENCOUNTER — TELEPHONE (OUTPATIENT)
Dept: INTERNAL MEDICINE CLINIC | Facility: CLINIC | Age: 84
End: 2019-01-01

## 2019-01-01 ENCOUNTER — OFFICE VISIT (OUTPATIENT)
Dept: HEMATOLOGY/ONCOLOGY | Facility: HOSPITAL | Age: 84
End: 2019-01-01
Attending: INTERNAL MEDICINE
Payer: MEDICARE

## 2019-01-01 ENCOUNTER — HOSPITAL ENCOUNTER (OUTPATIENT)
Dept: ULTRASOUND IMAGING | Facility: HOSPITAL | Age: 84
Discharge: HOME | End: 2019-01-01
Attending: RADIOLOGY
Payer: MEDICARE

## 2019-01-01 ENCOUNTER — HOSPITAL ENCOUNTER (OUTPATIENT)
Dept: MRI IMAGING | Age: 84
Discharge: HOME | End: 2019-01-01
Attending: INTERNAL MEDICINE
Payer: MEDICARE

## 2019-01-01 ENCOUNTER — ANCILLARY PROCEDURE (OUTPATIENT)
Dept: CARDIOLOGY | Age: 84
End: 2019-01-01
Attending: INTERNAL MEDICINE

## 2019-01-01 ENCOUNTER — APPOINTMENT (OUTPATIENT)
Dept: GENERAL RADIOLOGY | Facility: HOSPITAL | Age: 84
DRG: 242 | End: 2019-01-01
Attending: HOSPITALIST
Payer: MEDICARE

## 2019-01-01 ENCOUNTER — EXTERNAL FACILITY (OUTPATIENT)
Dept: INTERNAL MEDICINE CLINIC | Facility: CLINIC | Age: 84
End: 2019-01-01

## 2019-01-01 ENCOUNTER — DOCUMENTATION ONLY (OUTPATIENT)
Dept: CASE MANAGEMENT | Age: 84
End: 2019-01-01

## 2019-01-01 ENCOUNTER — TELEPHONE (OUTPATIENT)
Dept: ORTHOPEDICS CLINIC | Facility: CLINIC | Age: 84
End: 2019-01-01

## 2019-01-01 ENCOUNTER — HOSPITAL ENCOUNTER (EMERGENCY)
Facility: HOSPITAL | Age: 84
Discharge: HOME | End: 2019-01-01
Attending: EMERGENCY MEDICINE
Payer: MEDICARE

## 2019-01-01 ENCOUNTER — OFFICE VISIT (OUTPATIENT)
Dept: OTOLARYNGOLOGY | Facility: CLINIC | Age: 84
End: 2019-01-01
Payer: MEDICARE

## 2019-01-01 ENCOUNTER — HOSPITAL ENCOUNTER (INPATIENT)
Facility: HOSPITAL | Age: 84
LOS: 8 days | Discharge: NURSING, REHAB OR LONG-TERM CARE | DRG: 242 | End: 2019-01-01
Attending: EMERGENCY MEDICINE | Admitting: HOSPITALIST
Payer: MEDICARE

## 2019-01-01 ENCOUNTER — OFFICE VISIT (OUTPATIENT)
Dept: AUDIOLOGY | Facility: CLINIC | Age: 84
End: 2019-01-01
Payer: MEDICARE

## 2019-01-01 ENCOUNTER — HOSPITAL ENCOUNTER (OUTPATIENT)
Dept: INTERVENTIONAL RADIOLOGY/VASCULAR | Facility: HOSPITAL | Age: 84
Discharge: HOME | End: 2019-01-01
Attending: RADIOLOGY | Admitting: RADIOLOGY
Payer: MEDICARE

## 2019-01-01 ENCOUNTER — OFFICE VISIT (OUTPATIENT)
Dept: PODIATRY CLINIC | Facility: CLINIC | Age: 84
End: 2019-01-01
Payer: MEDICARE

## 2019-01-01 ENCOUNTER — APPOINTMENT (OUTPATIENT)
Dept: CT IMAGING | Facility: HOSPITAL | Age: 84
DRG: 242 | End: 2019-01-01
Attending: EMERGENCY MEDICINE
Payer: MEDICARE

## 2019-01-01 ENCOUNTER — HOSPITAL ENCOUNTER (OUTPATIENT)
Dept: GENERAL RADIOLOGY | Facility: HOSPITAL | Age: 84
Discharge: HOME | End: 2019-01-01
Attending: PODIATRIST
Payer: MEDICARE

## 2019-01-01 ENCOUNTER — APPOINTMENT (OUTPATIENT)
Dept: GENERAL RADIOLOGY | Facility: HOSPITAL | Age: 84
DRG: 242 | End: 2019-01-01
Attending: EMERGENCY MEDICINE
Payer: MEDICARE

## 2019-01-01 ENCOUNTER — HOSPITAL ENCOUNTER (OUTPATIENT)
Dept: GENERAL RADIOLOGY | Age: 84
Discharge: HOME | End: 2019-01-01
Attending: INTERNAL MEDICINE
Payer: MEDICARE

## 2019-01-01 ENCOUNTER — APPOINTMENT (OUTPATIENT)
Dept: CV DIAGNOSTICS | Facility: HOSPITAL | Age: 84
DRG: 242 | End: 2019-01-01
Attending: HOSPITALIST
Payer: MEDICARE

## 2019-01-01 ENCOUNTER — TELEPHONE (OUTPATIENT)
Dept: PODIATRY CLINIC | Facility: CLINIC | Age: 84
End: 2019-01-01

## 2019-01-01 ENCOUNTER — APPOINTMENT (OUTPATIENT)
Dept: LAB | Age: 84
End: 2019-01-01
Attending: INTERNAL MEDICINE
Payer: MEDICARE

## 2019-01-01 ENCOUNTER — APPOINTMENT (OUTPATIENT)
Dept: INTERVENTIONAL RADIOLOGY/VASCULAR | Facility: HOSPITAL | Age: 84
DRG: 242 | End: 2019-01-01
Attending: INTERNAL MEDICINE
Payer: MEDICARE

## 2019-01-01 VITALS
DIASTOLIC BLOOD PRESSURE: 50 MMHG | BODY MASS INDEX: 20 KG/M2 | SYSTOLIC BLOOD PRESSURE: 122 MMHG | HEART RATE: 67 BPM | OXYGEN SATURATION: 94 % | WEIGHT: 116 LBS | RESPIRATION RATE: 25 BRPM

## 2019-01-01 VITALS
SYSTOLIC BLOOD PRESSURE: 134 MMHG | WEIGHT: 117 LBS | RESPIRATION RATE: 16 BRPM | BODY MASS INDEX: 19 KG/M2 | DIASTOLIC BLOOD PRESSURE: 64 MMHG | HEART RATE: 51 BPM

## 2019-01-01 VITALS
OXYGEN SATURATION: 99 % | WEIGHT: 109.81 LBS | BODY MASS INDEX: 18.75 KG/M2 | HEIGHT: 64 IN | TEMPERATURE: 98 F | DIASTOLIC BLOOD PRESSURE: 73 MMHG | SYSTOLIC BLOOD PRESSURE: 107 MMHG | HEART RATE: 60 BPM | RESPIRATION RATE: 16 BRPM

## 2019-01-01 VITALS
TEMPERATURE: 98 F | WEIGHT: 115.06 LBS | HEART RATE: 48 BPM | BODY MASS INDEX: 20 KG/M2 | OXYGEN SATURATION: 100 % | SYSTOLIC BLOOD PRESSURE: 127 MMHG | RESPIRATION RATE: 18 BRPM | DIASTOLIC BLOOD PRESSURE: 73 MMHG

## 2019-01-01 VITALS
BODY MASS INDEX: 20.14 KG/M2 | SYSTOLIC BLOOD PRESSURE: 138 MMHG | HEIGHT: 64 IN | DIASTOLIC BLOOD PRESSURE: 56 MMHG | HEART RATE: 53 BPM | WEIGHT: 118 LBS | TEMPERATURE: 98 F

## 2019-01-01 VITALS
DIASTOLIC BLOOD PRESSURE: 67 MMHG | BODY MASS INDEX: 19.63 KG/M2 | HEIGHT: 64 IN | WEIGHT: 115 LBS | TEMPERATURE: 97 F | SYSTOLIC BLOOD PRESSURE: 128 MMHG

## 2019-01-01 VITALS
WEIGHT: 115 LBS | HEART RATE: 55 BPM | BODY MASS INDEX: 19.63 KG/M2 | RESPIRATION RATE: 18 BRPM | SYSTOLIC BLOOD PRESSURE: 126 MMHG | TEMPERATURE: 98 F | OXYGEN SATURATION: 99 % | DIASTOLIC BLOOD PRESSURE: 51 MMHG | HEIGHT: 64 IN

## 2019-01-01 VITALS
DIASTOLIC BLOOD PRESSURE: 67 MMHG | HEART RATE: 47 BPM | RESPIRATION RATE: 16 BRPM | WEIGHT: 115 LBS | TEMPERATURE: 98 F | BODY MASS INDEX: 20 KG/M2 | SYSTOLIC BLOOD PRESSURE: 122 MMHG

## 2019-01-01 VITALS
WEIGHT: 116 LBS | BODY MASS INDEX: 19 KG/M2 | DIASTOLIC BLOOD PRESSURE: 60 MMHG | RESPIRATION RATE: 18 BRPM | SYSTOLIC BLOOD PRESSURE: 140 MMHG | HEART RATE: 54 BPM

## 2019-01-01 DIAGNOSIS — E11.9 TYPE 2 DIABETES MELLITUS WITHOUT COMPLICATION, WITH LONG-TERM CURRENT USE OF INSULIN (HCC): ICD-10-CM

## 2019-01-01 DIAGNOSIS — E11.42 TYPE 2 DIABETES MELLITUS WITH DIABETIC POLYNEUROPATHY, WITH LONG-TERM CURRENT USE OF INSULIN (HCC): Primary | ICD-10-CM

## 2019-01-01 DIAGNOSIS — I73.9 PAD (PERIPHERAL ARTERY DISEASE) (HCC): ICD-10-CM

## 2019-01-01 DIAGNOSIS — Z79.4 TYPE 2 DIABETES MELLITUS WITHOUT COMPLICATION, WITH LONG-TERM CURRENT USE OF INSULIN (HCC): ICD-10-CM

## 2019-01-01 DIAGNOSIS — I73.9 PAD (PERIPHERAL ARTERY DISEASE) (HCC): Primary | ICD-10-CM

## 2019-01-01 DIAGNOSIS — M86.8X7 OTHER OSTEOMYELITIS OF LEFT FOOT (HCC): ICD-10-CM

## 2019-01-01 DIAGNOSIS — I96 GANGRENE OF TOE OF LEFT FOOT (HCC): Primary | ICD-10-CM

## 2019-01-01 DIAGNOSIS — E86.0 DEHYDRATION: ICD-10-CM

## 2019-01-01 DIAGNOSIS — I10 ESSENTIAL HYPERTENSION: ICD-10-CM

## 2019-01-01 DIAGNOSIS — I73.89 OTHER SPECIFIED PERIPHERAL VASCULAR DISEASES (HCC): ICD-10-CM

## 2019-01-01 DIAGNOSIS — N18.30 KIDNEY DISEASE, CHRONIC, STAGE III (GFR 30-59 ML/MIN) (HCC): ICD-10-CM

## 2019-01-01 DIAGNOSIS — R89.9 ABNORMAL LABORATORY TEST: Primary | ICD-10-CM

## 2019-01-01 DIAGNOSIS — I45.9 HEART BLOCK: ICD-10-CM

## 2019-01-01 DIAGNOSIS — L97.509 FOOT ULCER (HCC): Primary | ICD-10-CM

## 2019-01-01 DIAGNOSIS — L97.529: ICD-10-CM

## 2019-01-01 DIAGNOSIS — L03.116 CELLULITIS OF LEFT FOOT: ICD-10-CM

## 2019-01-01 DIAGNOSIS — Z79.4 TYPE 2 DIABETES MELLITUS WITH DIABETIC POLYNEUROPATHY, WITH LONG-TERM CURRENT USE OF INSULIN (HCC): Primary | ICD-10-CM

## 2019-01-01 DIAGNOSIS — I96 DRY GANGRENE (HCC): Primary | ICD-10-CM

## 2019-01-01 DIAGNOSIS — H90.3 SENSORINEURAL HEARING LOSS, BILATERAL: Primary | ICD-10-CM

## 2019-01-01 DIAGNOSIS — M86.8X7 OTHER OSTEOMYELITIS OF LEFT FOOT (HCC): Primary | ICD-10-CM

## 2019-01-01 DIAGNOSIS — R89.9 ABNORMAL LABORATORY TEST: ICD-10-CM

## 2019-01-01 DIAGNOSIS — R41.0 DISORIENTATION: ICD-10-CM

## 2019-01-01 DIAGNOSIS — R73.9 HYPERGLYCEMIA: Primary | ICD-10-CM

## 2019-01-01 DIAGNOSIS — E11.42 TYPE 2 DIABETES MELLITUS WITH DIABETIC POLYNEUROPATHY, WITH LONG-TERM CURRENT USE OF INSULIN (HCC): ICD-10-CM

## 2019-01-01 DIAGNOSIS — C91.10 CHRONIC LYMPHOID LEUKEMIA (HCC): ICD-10-CM

## 2019-01-01 DIAGNOSIS — Z95.0 CARDIAC PACEMAKER: ICD-10-CM

## 2019-01-01 DIAGNOSIS — Z79.4 TYPE 2 DIABETES MELLITUS WITH DIABETIC POLYNEUROPATHY, WITH LONG-TERM CURRENT USE OF INSULIN (HCC): ICD-10-CM

## 2019-01-01 DIAGNOSIS — I73.9 GANGRENE DUE TO PERIPHERAL VASCULAR DISEASE (HCC): ICD-10-CM

## 2019-01-01 DIAGNOSIS — C91.10 CHRONIC LYMPHOID LEUKEMIA (HCC): Primary | ICD-10-CM

## 2019-01-01 DIAGNOSIS — T78.40XA ALLERGIC REACTION, INITIAL ENCOUNTER: ICD-10-CM

## 2019-01-01 DIAGNOSIS — L97.509 FOOT ULCER (HCC): ICD-10-CM

## 2019-01-01 DIAGNOSIS — Z95.0 S/P PLACEMENT OF CARDIAC PACEMAKER: ICD-10-CM

## 2019-01-01 DIAGNOSIS — H61.23 BILATERAL IMPACTED CERUMEN: Primary | ICD-10-CM

## 2019-01-01 DIAGNOSIS — I10 ESSENTIAL HYPERTENSION WITH GOAL BLOOD PRESSURE LESS THAN 140/90: ICD-10-CM

## 2019-01-01 LAB
ALBUMIN SERPL-MCNC: 3.9 G/DL (ref 3.4–5)
ALBUMIN/GLOB SERPL: 1.1 {RATIO} (ref 1–2)
ALP LIVER SERPL-CCNC: 104 U/L (ref 55–142)
ALT SERPL-CCNC: 27 U/L (ref 13–56)
ANION GAP SERPL CALC-SCNC: 5 MMOL/L (ref 0–18)
AST SERPL-CCNC: 30 U/L (ref 15–37)
BASOPHILS # BLD AUTO: 0.06 X10(3) UL (ref 0–0.2)
BASOPHILS NFR BLD AUTO: 0.4 %
BILIRUB SERPL-MCNC: 0.5 MG/DL (ref 0.1–2)
BUN BLD-MCNC: 37 MG/DL (ref 7–18)
BUN/CREAT SERPL: 30.3 (ref 10–20)
CALCIUM BLD-MCNC: 9.4 MG/DL (ref 8.5–10.1)
CHLORIDE SERPL-SCNC: 109 MMOL/L (ref 98–112)
CO2 SERPL-SCNC: 28 MMOL/L (ref 21–32)
CREAT BLD-MCNC: 1.22 MG/DL (ref 0.55–1.02)
DEPRECATED RDW RBC AUTO: 50.3 FL (ref 35.1–46.3)
EOSINOPHIL # BLD AUTO: 0.22 X10(3) UL (ref 0–0.7)
EOSINOPHIL NFR BLD AUTO: 1.3 %
ERYTHROCYTE [DISTWIDTH] IN BLOOD BY AUTOMATED COUNT: 14.7 % (ref 11–15)
EST. AVERAGE GLUCOSE BLD GHB EST-MCNC: 146 MG/DL (ref 68–126)
GLOBULIN PLAS-MCNC: 3.4 G/DL (ref 2.8–4.4)
GLUCOSE BLD-MCNC: 189 MG/DL (ref 70–99)
HBA1C MFR BLD HPLC: 6.7 % (ref ?–5.7)
HCT VFR BLD AUTO: 41.5 % (ref 35–48)
HGB BLD-MCNC: 13 G/DL (ref 12–16)
IMM GRANULOCYTES # BLD AUTO: 0.04 X10(3) UL (ref 0–1)
IMM GRANULOCYTES NFR BLD: 0.2 %
LYMPHOCYTES # BLD AUTO: 7.55 X10(3) UL (ref 1–4)
LYMPHOCYTES NFR BLD AUTO: 45.1 %
M PROTEIN MFR SERPL ELPH: 7.3 G/DL (ref 6.4–8.2)
MCH RBC QN AUTO: 29.1 PG (ref 26–34)
MCHC RBC AUTO-ENTMCNC: 31.3 G/DL (ref 31–37)
MCV RBC AUTO: 92.8 FL (ref 80–100)
MONOCYTES # BLD AUTO: 0.94 X10(3) UL (ref 0.1–1)
MONOCYTES NFR BLD AUTO: 5.6 %
NEUTROPHILS # BLD AUTO: 7.93 X10 (3) UL (ref 1.5–7.7)
NEUTROPHILS # BLD AUTO: 7.93 X10(3) UL (ref 1.5–7.7)
NEUTROPHILS NFR BLD AUTO: 47.4 %
OSMOLALITY SERPL CALC.SUM OF ELEC: 308 MOSM/KG (ref 275–295)
PATIENT FASTING: NO
PLATELET # BLD AUTO: 235 10(3)UL (ref 150–450)
POTASSIUM SERPL-SCNC: 4.7 MMOL/L (ref 3.5–5.1)
RBC # BLD AUTO: 4.47 X10(6)UL (ref 3.8–5.3)
SODIUM SERPL-SCNC: 142 MMOL/L (ref 136–145)
WBC # BLD AUTO: 16.7 X10(3) UL (ref 4–11)

## 2019-01-01 PROCEDURE — 69210 REMOVE IMPACTED EAR WAX UNI: CPT | Performed by: OTOLARYNGOLOGY

## 2019-01-01 PROCEDURE — 99214 OFFICE O/P EST MOD 30 MIN: CPT | Performed by: INTERNAL MEDICINE

## 2019-01-01 PROCEDURE — 36415 COLL VENOUS BLD VENIPUNCTURE: CPT

## 2019-01-01 PROCEDURE — 85007 BL SMEAR W/DIFF WBC COUNT: CPT

## 2019-01-01 PROCEDURE — 71046 X-RAY EXAM CHEST 2 VIEWS: CPT | Performed by: INTERNAL MEDICINE

## 2019-01-01 PROCEDURE — 83036 HEMOGLOBIN GLYCOSYLATED A1C: CPT

## 2019-01-01 PROCEDURE — 99239 HOSP IP/OBS DSCHRG MGMT >30: CPT | Performed by: HOSPITALIST

## 2019-01-01 PROCEDURE — 85060 BLOOD SMEAR INTERPRETATION: CPT

## 2019-01-01 PROCEDURE — 99203 OFFICE O/P NEW LOW 30 MIN: CPT | Performed by: PODIATRIST

## 2019-01-01 PROCEDURE — 82962 GLUCOSE BLOOD TEST: CPT

## 2019-01-01 PROCEDURE — 70450 CT HEAD/BRAIN W/O DYE: CPT | Performed by: EMERGENCY MEDICINE

## 2019-01-01 PROCEDURE — 80053 COMPREHEN METABOLIC PANEL: CPT

## 2019-01-01 PROCEDURE — 93306 TTE W/DOPPLER COMPLETE: CPT | Performed by: HOSPITALIST

## 2019-01-01 PROCEDURE — 85652 RBC SED RATE AUTOMATED: CPT

## 2019-01-01 PROCEDURE — 99153 MOD SED SAME PHYS/QHP EA: CPT

## 2019-01-01 PROCEDURE — G0463 HOSPITAL OUTPT CLINIC VISIT: HCPCS | Performed by: INTERNAL MEDICINE

## 2019-01-01 PROCEDURE — 99283 EMERGENCY DEPT VISIT LOW MDM: CPT

## 2019-01-01 PROCEDURE — 80048 BASIC METABOLIC PNL TOTAL CA: CPT

## 2019-01-01 PROCEDURE — 85025 COMPLETE CBC W/AUTO DIFF WBC: CPT

## 2019-01-01 PROCEDURE — 047N3Z1 DILATION OF LEFT POPLITEAL ARTERY USING DRUG-COATED BALLOON, PERCUTANEOUS APPROACH: ICD-10-PCS | Performed by: RADIOLOGY

## 2019-01-01 PROCEDURE — 71046 X-RAY EXAM CHEST 2 VIEWS: CPT | Performed by: HOSPITALIST

## 2019-01-01 PROCEDURE — 99233 SBSQ HOSP IP/OBS HIGH 50: CPT | Performed by: HOSPITALIST

## 2019-01-01 PROCEDURE — 73630 X-RAY EXAM OF FOOT: CPT | Performed by: HOSPITALIST

## 2019-01-01 PROCEDURE — G0463 HOSPITAL OUTPT CLINIC VISIT: HCPCS | Performed by: PODIATRIST

## 2019-01-01 PROCEDURE — 02H63JZ INSERTION OF PACEMAKER LEAD INTO RIGHT ATRIUM, PERCUTANEOUS APPROACH: ICD-10-PCS | Performed by: INTERNAL MEDICINE

## 2019-01-01 PROCEDURE — 92593 HEARING AID CHECK, BOTH EARS: CPT | Performed by: AUDIOLOGIST

## 2019-01-01 PROCEDURE — 73620 X-RAY EXAM OF FOOT: CPT | Performed by: INTERNAL MEDICINE

## 2019-01-01 PROCEDURE — 99223 1ST HOSP IP/OBS HIGH 75: CPT | Performed by: OTHER

## 2019-01-01 PROCEDURE — 99152 MOD SED SAME PHYS/QHP 5/>YRS: CPT

## 2019-01-01 PROCEDURE — 73718 MRI LOWER EXTREMITY W/O DYE: CPT | Performed by: INTERNAL MEDICINE

## 2019-01-01 PROCEDURE — 99232 SBSQ HOSP IP/OBS MODERATE 35: CPT | Performed by: HOSPITALIST

## 2019-01-01 PROCEDURE — 71045 X-RAY EXAM CHEST 1 VIEW: CPT | Performed by: HOSPITALIST

## 2019-01-01 PROCEDURE — 80048 BASIC METABOLIC PNL TOTAL CA: CPT | Performed by: EMERGENCY MEDICINE

## 2019-01-01 PROCEDURE — 86140 C-REACTIVE PROTEIN: CPT

## 2019-01-01 PROCEDURE — 71045 X-RAY EXAM CHEST 1 VIEW: CPT | Performed by: EMERGENCY MEDICINE

## 2019-01-01 PROCEDURE — 99306 1ST NF CARE HIGH MDM 50: CPT | Performed by: INTERNAL MEDICINE

## 2019-01-01 PROCEDURE — 99223 1ST HOSP IP/OBS HIGH 75: CPT | Performed by: HOSPITALIST

## 2019-01-01 PROCEDURE — 85025 COMPLETE CBC W/AUTO DIFF WBC: CPT | Performed by: EMERGENCY MEDICINE

## 2019-01-01 PROCEDURE — 99213 OFFICE O/P EST LOW 20 MIN: CPT | Performed by: INTERNAL MEDICINE

## 2019-01-01 PROCEDURE — 37224 HC TRANSLUMINAL ANGIOPLASTY FEM POP UNILATERAL: CPT

## 2019-01-01 PROCEDURE — 99213 OFFICE O/P EST LOW 20 MIN: CPT | Performed by: PODIATRIST

## 2019-01-01 PROCEDURE — 73630 X-RAY EXAM OF FOOT: CPT | Performed by: PODIATRIST

## 2019-01-01 PROCEDURE — 93926 LOWER EXTREMITY STUDY: CPT | Performed by: RADIOLOGY

## 2019-01-01 PROCEDURE — 85027 COMPLETE CBC AUTOMATED: CPT

## 2019-01-01 PROCEDURE — 85347 COAGULATION TIME ACTIVATED: CPT

## 2019-01-01 PROCEDURE — 0JH606Z INSERTION OF PACEMAKER, DUAL CHAMBER INTO CHEST SUBCUTANEOUS TISSUE AND FASCIA, OPEN APPROACH: ICD-10-PCS | Performed by: INTERNAL MEDICINE

## 2019-01-01 PROCEDURE — X1094 NO CHARGE VISIT: HCPCS | Performed by: INTERNAL MEDICINE

## 2019-01-01 PROCEDURE — 02HK3JZ INSERTION OF PACEMAKER LEAD INTO RIGHT VENTRICLE, PERCUTANEOUS APPROACH: ICD-10-PCS | Performed by: INTERNAL MEDICINE

## 2019-01-01 RX ORDER — FUROSEMIDE 20 MG/1
10 TABLET ORAL EVERY OTHER DAY
Status: DISCONTINUED | OUTPATIENT
Start: 2019-01-01 | End: 2019-01-01

## 2019-01-01 RX ORDER — CEFAZOLIN SODIUM/WATER 2 G/20 ML
2 SYRINGE (ML) INTRAVENOUS EVERY 8 HOURS
Status: COMPLETED | OUTPATIENT
Start: 2019-01-01 | End: 2019-01-01

## 2019-01-01 RX ORDER — POTASSIUM CHLORIDE 20 MEQ/1
40 TABLET, EXTENDED RELEASE ORAL EVERY 4 HOURS
Status: COMPLETED | OUTPATIENT
Start: 2019-01-01 | End: 2019-01-01

## 2019-01-01 RX ORDER — ASPIRIN 325 MG
325 TABLET ORAL DAILY
Status: DISCONTINUED | OUTPATIENT
Start: 2019-01-01 | End: 2019-01-01

## 2019-01-01 RX ORDER — DOPAMINE HYDROCHLORIDE 320 MG/100ML
2 INJECTION, SOLUTION INTRAVENOUS CONTINUOUS
Status: DISCONTINUED | OUTPATIENT
Start: 2019-01-01 | End: 2019-01-01

## 2019-01-01 RX ORDER — AMIODARONE HYDROCHLORIDE 200 MG/1
200 TABLET ORAL DAILY
Qty: 30 TABLET | Refills: 1 | Status: SHIPPED | OUTPATIENT
Start: 2019-01-01 | End: 2020-01-01

## 2019-01-01 RX ORDER — SODIUM CHLORIDE 9 MG/ML
1 INJECTION, SOLUTION INTRAVENOUS CONTINUOUS
Status: DISCONTINUED | OUTPATIENT
Start: 2019-01-01 | End: 2019-01-01

## 2019-01-01 RX ORDER — FUROSEMIDE 20 MG/1
10 TABLET ORAL EVERY OTHER DAY
Qty: 30 TABLET | Refills: 1 | Status: SHIPPED | OUTPATIENT
Start: 2019-01-01

## 2019-01-01 RX ORDER — INSULIN ASPART 100 [IU]/ML
INJECTION, SUSPENSION SUBCUTANEOUS
Qty: 15 ML | Refills: 5 | Status: ON HOLD | OUTPATIENT
Start: 2019-01-01 | End: 2019-01-01

## 2019-01-01 RX ORDER — IPRATROPIUM BROMIDE AND ALBUTEROL SULFATE 2.5; .5 MG/3ML; MG/3ML
3 SOLUTION RESPIRATORY (INHALATION) EVERY 6 HOURS PRN
Status: DISCONTINUED | OUTPATIENT
Start: 2019-01-01 | End: 2019-01-01

## 2019-01-01 RX ORDER — FUROSEMIDE 10 MG/ML
40 INJECTION INTRAMUSCULAR; INTRAVENOUS ONCE
Status: COMPLETED | OUTPATIENT
Start: 2019-01-01 | End: 2019-01-01

## 2019-01-01 RX ORDER — HEPARIN SODIUM 1000 [USP'U]/ML
INJECTION, SOLUTION INTRAVENOUS; SUBCUTANEOUS
Status: COMPLETED
Start: 2019-01-01 | End: 2019-01-01

## 2019-01-01 RX ORDER — FAMOTIDINE 20 MG/1
20 TABLET ORAL DAILY
Qty: 30 TABLET | Refills: 0 | Status: SHIPPED | OUTPATIENT
Start: 2019-01-01 | End: 2020-01-01

## 2019-01-01 RX ORDER — ONDANSETRON 2 MG/ML
8 INJECTION INTRAMUSCULAR; INTRAVENOUS EVERY 4 HOURS PRN
Status: DISCONTINUED | OUTPATIENT
Start: 2019-01-01 | End: 2019-01-01

## 2019-01-01 RX ORDER — TIMOLOL MALEATE 5 MG/ML
1 SOLUTION/ DROPS OPHTHALMIC 2 TIMES DAILY
Status: DISCONTINUED | OUTPATIENT
Start: 2019-01-01 | End: 2019-01-01

## 2019-01-01 RX ORDER — LATANOPROST 50 UG/ML
1 SOLUTION/ DROPS OPHTHALMIC NIGHTLY
Status: DISCONTINUED | OUTPATIENT
Start: 2019-01-01 | End: 2019-01-01

## 2019-01-01 RX ORDER — AMLODIPINE BESYLATE 5 MG/1
5 TABLET ORAL 2 TIMES DAILY
Status: DISCONTINUED | OUTPATIENT
Start: 2019-01-01 | End: 2019-01-01

## 2019-01-01 RX ORDER — ONDANSETRON 2 MG/ML
4 INJECTION INTRAMUSCULAR; INTRAVENOUS EVERY 6 HOURS PRN
Status: DISCONTINUED | OUTPATIENT
Start: 2019-01-01 | End: 2019-01-01

## 2019-01-01 RX ORDER — ATORVASTATIN CALCIUM 10 MG/1
TABLET, FILM COATED ORAL
Qty: 90 TABLET | Refills: 1 | Status: SHIPPED | OUTPATIENT
Start: 2019-01-01 | End: 2020-01-01

## 2019-01-01 RX ORDER — ASPIRIN 81 MG/1
TABLET, CHEWABLE ORAL
Status: DISCONTINUED
Start: 2019-01-01 | End: 2019-01-01

## 2019-01-01 RX ORDER — LIDOCAINE HYDROCHLORIDE 20 MG/ML
INJECTION, SOLUTION EPIDURAL; INFILTRATION; INTRACAUDAL; PERINEURAL
Status: COMPLETED
Start: 2019-01-01 | End: 2019-01-01

## 2019-01-01 RX ORDER — FAMOTIDINE 20 MG/1
20 TABLET ORAL DAILY
Status: DISCONTINUED | OUTPATIENT
Start: 2019-01-01 | End: 2019-01-01

## 2019-01-01 RX ORDER — AMLODIPINE BESYLATE 5 MG/1
TABLET ORAL
Qty: 180 TABLET | Refills: 1 | Status: SHIPPED | OUTPATIENT
Start: 2019-01-01 | End: 2019-01-01

## 2019-01-01 RX ORDER — LISINOPRIL 40 MG/1
40 TABLET ORAL DAILY
Status: DISCONTINUED | OUTPATIENT
Start: 2019-01-01 | End: 2019-01-01

## 2019-01-01 RX ORDER — MIDAZOLAM HYDROCHLORIDE 1 MG/ML
INJECTION INTRAMUSCULAR; INTRAVENOUS
Status: COMPLETED
Start: 2019-01-01 | End: 2019-01-01

## 2019-01-01 RX ORDER — AMIODARONE HYDROCHLORIDE 200 MG/1
200 TABLET ORAL DAILY
Status: DISCONTINUED | OUTPATIENT
Start: 2019-01-01 | End: 2019-01-01

## 2019-01-01 RX ORDER — ASPIRIN 300 MG
300 SUPPOSITORY, RECTAL RECTAL DAILY
Status: DISCONTINUED | OUTPATIENT
Start: 2019-01-01 | End: 2019-01-01

## 2019-01-01 RX ORDER — HEPARIN SODIUM 5000 [USP'U]/ML
5000 INJECTION, SOLUTION INTRAVENOUS; SUBCUTANEOUS EVERY 12 HOURS SCHEDULED
Status: DISCONTINUED | OUTPATIENT
Start: 2019-01-01 | End: 2019-01-01

## 2019-01-01 RX ORDER — LIDOCAINE HYDROCHLORIDE AND EPINEPHRINE 10; 10 MG/ML; UG/ML
INJECTION, SOLUTION INFILTRATION; PERINEURAL
Status: COMPLETED
Start: 2019-01-01 | End: 2019-01-01

## 2019-01-01 RX ORDER — FUROSEMIDE 10 MG/ML
40 INJECTION INTRAMUSCULAR; INTRAVENOUS
Status: DISCONTINUED | OUTPATIENT
Start: 2019-01-01 | End: 2019-01-01

## 2019-01-01 RX ORDER — SODIUM CHLORIDE 9 MG/ML
INJECTION, SOLUTION INTRAVENOUS CONTINUOUS
Status: ACTIVE | OUTPATIENT
Start: 2019-01-01 | End: 2019-01-01

## 2019-01-01 RX ORDER — AMLODIPINE BESYLATE 5 MG/1
TABLET ORAL
Qty: 180 TABLET | Refills: 1 | Status: SHIPPED | OUTPATIENT
Start: 2019-01-01

## 2019-01-01 RX ORDER — ACETAMINOPHEN 325 MG/1
650 TABLET ORAL EVERY 4 HOURS PRN
Qty: 20 TABLET | Refills: 0 | Status: SHIPPED | OUTPATIENT
Start: 2019-01-01

## 2019-01-01 RX ORDER — IPRATROPIUM BROMIDE AND ALBUTEROL SULFATE 2.5; .5 MG/3ML; MG/3ML
3 SOLUTION RESPIRATORY (INHALATION) EVERY 4 HOURS PRN
Qty: 1 VIAL | Refills: 0 | Status: SHIPPED | OUTPATIENT
Start: 2019-01-01

## 2019-01-01 RX ORDER — SODIUM CHLORIDE 0.9 % (FLUSH) 0.9 %
3 SYRINGE (ML) INJECTION AS NEEDED
Status: DISCONTINUED | OUTPATIENT
Start: 2019-01-01 | End: 2019-01-01

## 2019-01-01 RX ORDER — SENNOSIDES 8.6 MG
17.2 TABLET ORAL NIGHTLY
Status: DISCONTINUED | OUTPATIENT
Start: 2019-01-01 | End: 2019-01-01

## 2019-01-01 RX ORDER — FUROSEMIDE 20 MG/1
40 TABLET ORAL ONCE
Status: DISCONTINUED | OUTPATIENT
Start: 2019-01-01 | End: 2019-01-01

## 2019-01-01 RX ORDER — CLOPIDOGREL BISULFATE 75 MG/1
75 TABLET ORAL DAILY
Status: DISCONTINUED | OUTPATIENT
Start: 2019-01-01 | End: 2019-01-01

## 2019-01-01 RX ORDER — LISINOPRIL 40 MG/1
TABLET ORAL
Qty: 90 TABLET | Refills: 1 | Status: SHIPPED | OUTPATIENT
Start: 2019-01-01 | End: 2019-01-01

## 2019-01-01 RX ORDER — CEFAZOLIN SODIUM/WATER 2 G/20 ML
SYRINGE (ML) INTRAVENOUS
Status: COMPLETED
Start: 2019-01-01 | End: 2019-01-01

## 2019-01-01 RX ORDER — HALOPERIDOL 5 MG/ML
1 INJECTION INTRAMUSCULAR ONCE
Status: COMPLETED | OUTPATIENT
Start: 2019-01-01 | End: 2019-01-01

## 2019-01-01 RX ORDER — LISINOPRIL 40 MG/1
TABLET ORAL
Qty: 90 TABLET | Refills: 1 | Status: SHIPPED | OUTPATIENT
Start: 2019-01-01

## 2019-01-01 RX ORDER — CLOPIDOGREL BISULFATE 75 MG/1
75 TABLET ORAL DAILY
Qty: 30 TABLET | Refills: 2 | Status: SHIPPED | OUTPATIENT
Start: 2019-01-01 | End: 2020-01-01

## 2019-01-01 RX ORDER — ASPIRIN 81 MG/1
TABLET ORAL
Qty: 90 TABLET | Refills: 1 | Status: SHIPPED | OUTPATIENT
Start: 2019-01-01 | End: 2019-01-01

## 2019-01-01 RX ORDER — ACETAMINOPHEN 325 MG/1
650 TABLET ORAL EVERY 4 HOURS PRN
Status: DISCONTINUED | OUTPATIENT
Start: 2019-01-01 | End: 2019-01-01

## 2019-01-01 RX ORDER — ATORVASTATIN CALCIUM 10 MG/1
10 TABLET, FILM COATED ORAL NIGHTLY
Status: DISCONTINUED | OUTPATIENT
Start: 2019-01-01 | End: 2019-01-01

## 2019-01-01 RX ORDER — ACETAMINOPHEN 650 MG/1
650 SUPPOSITORY RECTAL EVERY 4 HOURS PRN
Status: DISCONTINUED | OUTPATIENT
Start: 2019-01-01 | End: 2019-01-01

## 2019-01-01 RX ORDER — IPRATROPIUM BROMIDE AND ALBUTEROL SULFATE 2.5; .5 MG/3ML; MG/3ML
3 SOLUTION RESPIRATORY (INHALATION) 2 TIMES DAILY
Status: DISCONTINUED | OUTPATIENT
Start: 2019-01-01 | End: 2019-01-01

## 2019-01-01 RX ORDER — METOCLOPRAMIDE HYDROCHLORIDE 5 MG/ML
5 INJECTION INTRAMUSCULAR; INTRAVENOUS EVERY 8 HOURS PRN
Status: DISCONTINUED | OUTPATIENT
Start: 2019-01-01 | End: 2019-01-01

## 2019-01-01 RX ORDER — BACITRACIN 50000 [USP'U]/1
INJECTION, POWDER, LYOPHILIZED, FOR SOLUTION INTRAMUSCULAR
Status: COMPLETED
Start: 2019-01-01 | End: 2019-01-01

## 2019-01-01 RX ORDER — DEXTROSE MONOHYDRATE 25 G/50ML
50 INJECTION, SOLUTION INTRAVENOUS AS NEEDED
Status: DISCONTINUED | OUTPATIENT
Start: 2019-01-01 | End: 2019-01-01

## 2019-01-01 RX ORDER — ONDANSETRON 2 MG/ML
4 INJECTION INTRAMUSCULAR; INTRAVENOUS ONCE
Status: COMPLETED | OUTPATIENT
Start: 2019-01-01 | End: 2019-01-01

## 2019-01-01 RX ORDER — FAMOTIDINE 10 MG/ML
20 INJECTION, SOLUTION INTRAVENOUS DAILY
Status: DISCONTINUED | OUTPATIENT
Start: 2019-01-01 | End: 2019-01-01

## 2019-01-01 RX ADMIN — SODIUM CHLORIDE 1 ML/KG/HR: 9 INJECTION, SOLUTION INTRAVENOUS at 08:15:00

## 2019-01-07 ENCOUNTER — HOSPITAL ENCOUNTER (OUTPATIENT)
Dept: CT IMAGING | Age: 84
Discharge: HOME | End: 2019-01-07
Attending: INTERNAL MEDICINE
Payer: MEDICARE

## 2019-01-07 DIAGNOSIS — R91.8 PULMONARY NODULES: ICD-10-CM

## 2019-01-07 DIAGNOSIS — R63.4 WEIGHT LOSS: ICD-10-CM

## 2019-01-07 DIAGNOSIS — Z85.038 HISTORY OF COLON CANCER IN ADULTHOOD: ICD-10-CM

## 2019-01-07 PROCEDURE — 71250 CT THORAX DX C-: CPT | Performed by: INTERNAL MEDICINE

## 2019-01-07 PROCEDURE — 74176 CT ABD & PELVIS W/O CONTRAST: CPT | Performed by: INTERNAL MEDICINE

## 2019-01-08 NOTE — PROGRESS NOTES
Called pt. Advised no significant abnormalities. Advised of compression fractures, however pt states she does not have back pain. Advised of CAD.   Pt says she does not have a heart history and she is 80years old, so it is likely not clinically signific

## 2019-01-10 ENCOUNTER — TELEPHONE (OUTPATIENT)
Dept: INTERNAL MEDICINE CLINIC | Facility: CLINIC | Age: 84
End: 2019-01-10

## 2019-01-10 NOTE — TELEPHONE ENCOUNTER
Advised patient's daughter on Dr. Glendy Joiner information and recommendations. Daughter verbalized understanding.  She said patient will see Dr Júnior Escobar 3/8/19, she will have lab done 1 week prior, and daughter wants to speak with Dr Júnior Escobar and will call 1 week

## 2019-01-24 ENCOUNTER — OFFICE VISIT (OUTPATIENT)
Dept: AUDIOLOGY | Facility: CLINIC | Age: 84
End: 2019-01-24
Payer: MEDICARE

## 2019-01-24 ENCOUNTER — OFFICE VISIT (OUTPATIENT)
Dept: OTOLARYNGOLOGY | Facility: CLINIC | Age: 84
End: 2019-01-24
Payer: MEDICARE

## 2019-01-24 VITALS
DIASTOLIC BLOOD PRESSURE: 48 MMHG | SYSTOLIC BLOOD PRESSURE: 136 MMHG | BODY MASS INDEX: 19.16 KG/M2 | TEMPERATURE: 99 F | HEIGHT: 65 IN | WEIGHT: 115 LBS

## 2019-01-24 DIAGNOSIS — H61.23 BILATERAL IMPACTED CERUMEN: Primary | ICD-10-CM

## 2019-01-24 DIAGNOSIS — H90.3 SENSORINEURAL HEARING LOSS, BILATERAL: Primary | ICD-10-CM

## 2019-01-24 PROCEDURE — 69210 REMOVE IMPACTED EAR WAX UNI: CPT | Performed by: OTOLARYNGOLOGY

## 2019-01-24 PROCEDURE — 92593 HEARING AID CHECK, BOTH EARS: CPT | Performed by: AUDIOLOGIST

## 2019-01-24 RX ORDER — ISOPROPYL ALCOHOL 0.7 ML/1
SWAB TOPICAL
Refills: 99 | COMMUNITY
Start: 2018-11-06

## 2019-01-24 NOTE — PROGRESS NOTES
Dana Sawant is a 80year old female.   Patient presents with:  Cerumen Impaction: Bilateral ear cleaning, no pain      HISTORY OF PRESENT ILLNESS    Patient presents for cerumen removal. No other complaints or concerns at this time    Social History ENMT Negative Drooling. Eyes Negative Blurred vision and vision changes. Respiratory Negative Dyspnea and wheezing. Cardio Negative Chest pain, irregular heartbeat/palpitations and syncope. GI Negative Abdominal pain and diarrhea.    Endocrine Neg 70 % Does not apply Pads, Take as directed, Disp: , Rfl: 99  •  AMLODIPINE BESYLATE 5 MG Oral Tab, TAKE 1 TABLET BY MOUTH EVERY 12 HOURS, Disp: 180 tablet, Rfl: 0  •  NOVOLOG MIX 70/30 FLEXPEN (70-30) 100 UNIT/ML Subcutaneous Suspension Pen-injector, INJEC

## 2019-01-25 NOTE — PROGRESS NOTES
HEARING AID FOLLOW-UP    Brad Negrete  7/18/1926  [id number]    Pt was accompanied by her daughter, Isidoro Bautista. Pt was seen for a routine appointmsnt and had no specific concerns.     In office the following actions were taken:  Cleaned aid  Cleaned earm

## 2019-01-30 RX ORDER — ATORVASTATIN CALCIUM 10 MG/1
TABLET, FILM COATED ORAL
Qty: 90 TABLET | Refills: 1 | Status: SHIPPED | OUTPATIENT
Start: 2019-01-30 | End: 2019-01-01

## 2019-02-06 RX ORDER — ASPIRIN 81 MG/1
TABLET ORAL
Qty: 30 TABLET | Refills: 5 | Status: SHIPPED | OUTPATIENT
Start: 2019-02-06 | End: 2019-01-01

## 2019-02-11 RX ORDER — INSULIN ASPART 100 [IU]/ML
INJECTION, SUSPENSION SUBCUTANEOUS
Qty: 15 ML | Refills: 2 | Status: SHIPPED | OUTPATIENT
Start: 2019-02-11 | End: 2019-01-01

## 2019-02-12 NOTE — TELEPHONE ENCOUNTER
Refill passed per [facility], [facility] protocol.   Diabetes Medications  Protocol Criteria:  · Appointment scheduled in the past 6 months or the next 3 months  · A1C < 7.5 in the past 6 months  · Creatinine in the past 12 months  · Creatinine result < 1.5   Rece

## 2019-02-13 ENCOUNTER — HOSPITAL ENCOUNTER (OUTPATIENT)
Dept: GENERAL RADIOLOGY | Age: 84
Discharge: HOME | End: 2019-02-13
Attending: PODIATRIST
Payer: MEDICARE

## 2019-02-13 DIAGNOSIS — I73.9 PVD (PERIPHERAL VASCULAR DISEASE) (HCC): ICD-10-CM

## 2019-02-13 DIAGNOSIS — G62.9 NEUROPATHY: ICD-10-CM

## 2019-02-13 DIAGNOSIS — M86.9 OSTEOMYELITIS (HCC): ICD-10-CM

## 2019-02-13 PROCEDURE — 73630 X-RAY EXAM OF FOOT: CPT | Performed by: PODIATRIST

## 2019-02-18 DIAGNOSIS — E11.9 TYPE 2 DIABETES MELLITUS WITHOUT COMPLICATION, WITH LONG-TERM CURRENT USE OF INSULIN (HCC): ICD-10-CM

## 2019-02-18 DIAGNOSIS — Z79.4 TYPE 2 DIABETES MELLITUS WITHOUT COMPLICATION, WITH LONG-TERM CURRENT USE OF INSULIN (HCC): ICD-10-CM

## 2019-02-18 NOTE — TELEPHONE ENCOUNTER
Refill request from the [facility] Prescription # HE   Contour 50'S [facility] [id number] No   Written Date Last Fill Date Quantity Days Supply   6/16/2018 12/28/2018 100 each 30   Sig Notes   USE TO test THREE TIMES DAILY

## 2019-02-19 NOTE — TELEPHONE ENCOUNTER
Refilled per protocol    Diabetes Medications  Protocol Criteria:  · Appointment scheduled in the past 6 months or the next 3 months  · A1C < 7.5 in the past 6 months  · Creatinine in the past 12 months  · Creatinine result < 1.5   Recent Outpatient Visi

## 2019-02-26 ENCOUNTER — LAB ENCOUNTER (OUTPATIENT)
Dept: LAB | Age: 84
End: 2019-02-26
Attending: INTERNAL MEDICINE
Payer: MEDICARE

## 2019-02-26 DIAGNOSIS — Z79.4 TYPE 2 DIABETES MELLITUS WITH BOTH EYES AFFECTED BY SEVERE NONPROLIFERATIVE RETINOPATHY WITHOUT MACULAR EDEMA, WITH LONG-TERM CURRENT USE OF INSULIN (HCC): ICD-10-CM

## 2019-02-26 DIAGNOSIS — E11.3493 TYPE 2 DIABETES MELLITUS WITH BOTH EYES AFFECTED BY SEVERE NONPROLIFERATIVE RETINOPATHY WITHOUT MACULAR EDEMA, WITH LONG-TERM CURRENT USE OF INSULIN (HCC): ICD-10-CM

## 2019-02-26 LAB
ALBUMIN SERPL-MCNC: 3.7 G/DL (ref 3.4–5)
ALBUMIN/GLOB SERPL: 1.1 {RATIO} (ref 1–2)
ALP LIVER SERPL-CCNC: 93 U/L (ref 55–142)
ALT SERPL-CCNC: 19 U/L (ref 13–56)
ANION GAP SERPL CALC-SCNC: 8 MMOL/L (ref 0–18)
AST SERPL-CCNC: 24 U/L (ref 15–37)
BASOPHILS # BLD AUTO: 0.07 X10(3) UL (ref 0–0.2)
BASOPHILS NFR BLD AUTO: 0.5 %
BILIRUB SERPL-MCNC: 0.5 MG/DL (ref 0.1–2)
BUN BLD-MCNC: 26 MG/DL (ref 7–18)
BUN/CREAT SERPL: 20.6 (ref 10–20)
CALCIUM BLD-MCNC: 9.3 MG/DL (ref 8.5–10.1)
CHLORIDE SERPL-SCNC: 110 MMOL/L (ref 98–107)
CHOLEST SMN-MCNC: 113 MG/DL (ref ?–200)
CO2 SERPL-SCNC: 23 MMOL/L (ref 21–32)
CREAT BLD-MCNC: 1.26 MG/DL (ref 0.55–1.02)
DEPRECATED RDW RBC AUTO: 49 FL (ref 35.1–46.3)
EOSINOPHIL # BLD AUTO: 0.18 X10(3) UL (ref 0–0.7)
EOSINOPHIL NFR BLD AUTO: 1.4 %
ERYTHROCYTE [DISTWIDTH] IN BLOOD BY AUTOMATED COUNT: 14.6 % (ref 11–15)
EST. AVERAGE GLUCOSE BLD GHB EST-MCNC: 160 MG/DL (ref 68–126)
GLOBULIN PLAS-MCNC: 3.4 G/DL (ref 2.8–4.4)
GLUCOSE BLD-MCNC: 137 MG/DL (ref 70–99)
HBA1C MFR BLD HPLC: 7.2 % (ref ?–5.7)
HCT VFR BLD AUTO: 39.5 % (ref 35–48)
HDLC SERPL-MCNC: 55 MG/DL (ref 40–59)
HGB BLD-MCNC: 12.4 G/DL (ref 12–16)
IMM GRANULOCYTES # BLD AUTO: 0.03 X10(3) UL (ref 0–1)
IMM GRANULOCYTES NFR BLD: 0.2 %
LDLC SERPL CALC-MCNC: 44 MG/DL (ref ?–100)
LYMPHOCYTES # BLD AUTO: 6.97 X10(3) UL (ref 1–4)
LYMPHOCYTES NFR BLD AUTO: 53 %
M PROTEIN MFR SERPL ELPH: 7.1 G/DL (ref 6.4–8.2)
MCH RBC QN AUTO: 28.7 PG (ref 26–34)
MCHC RBC AUTO-ENTMCNC: 31.4 G/DL (ref 31–37)
MCV RBC AUTO: 91.4 FL (ref 80–100)
MONOCYTES # BLD AUTO: 0.79 X10(3) UL (ref 0.1–1)
MONOCYTES NFR BLD AUTO: 6 %
NEUTROPHILS # BLD AUTO: 5.12 X10 (3) UL (ref 1.5–7.7)
NEUTROPHILS # BLD AUTO: 5.12 X10(3) UL (ref 1.5–7.7)
NEUTROPHILS NFR BLD AUTO: 38.9 %
NONHDLC SERPL-MCNC: 58 MG/DL (ref ?–130)
OSMOLALITY SERPL CALC.SUM OF ELEC: 299 MOSM/KG (ref 275–295)
PLATELET # BLD AUTO: 249 10(3)UL (ref 150–450)
POTASSIUM SERPL-SCNC: 4.5 MMOL/L (ref 3.5–5.1)
RBC # BLD AUTO: 4.32 X10(6)UL (ref 3.8–5.3)
SODIUM SERPL-SCNC: 141 MMOL/L (ref 136–145)
TRIGL SERPL-MCNC: 69 MG/DL (ref 30–149)
VLDLC SERPL CALC-MCNC: 14 MG/DL (ref 0–30)
WBC # BLD AUTO: 13.2 X10(3) UL (ref 4–11)

## 2019-02-26 PROCEDURE — 80053 COMPREHEN METABOLIC PANEL: CPT

## 2019-02-26 PROCEDURE — 85025 COMPLETE CBC W/AUTO DIFF WBC: CPT

## 2019-02-26 PROCEDURE — 80061 LIPID PANEL: CPT

## 2019-02-26 PROCEDURE — 83036 HEMOGLOBIN GLYCOSYLATED A1C: CPT

## 2019-02-26 PROCEDURE — 36415 COLL VENOUS BLD VENIPUNCTURE: CPT

## 2019-02-26 PROCEDURE — 85060 BLOOD SMEAR INTERPRETATION: CPT

## 2019-02-27 ENCOUNTER — TELEPHONE (OUTPATIENT)
Dept: INTERNAL MEDICINE CLINIC | Facility: CLINIC | Age: 84
End: 2019-02-27

## 2019-02-27 NOTE — TELEPHONE ENCOUNTER
Vandana Thurman, daughter, called in stating that she would like to discuss some things with Dr. Jesus Bocanegra prior to her appt next week. Asking for a call back.

## 2019-02-28 NOTE — TELEPHONE ENCOUNTER
Spoke to daughter on 2/27/2018. She reports that patient is more forgetful, irritable, sounds like she may be even paranoid at times. Had problem with osteomyelitis of the toe managed by podiatry, recommended arterial Doppler studies to be done. Advised daughter on recent lab work, signs of dehydration and worsening kidney function test that could be part of the confusion, encourage patient to drink more liquids, patient lives in assisted living environment, advised to find out if [facility] encourage patient to drink more liquids.   Will review all concerns during upcoming office visit

## 2019-03-08 ENCOUNTER — OFFICE VISIT (OUTPATIENT)
Dept: INTERNAL MEDICINE CLINIC | Facility: CLINIC | Age: 84
End: 2019-03-08
Payer: MEDICARE

## 2019-03-08 ENCOUNTER — HOSPITAL ENCOUNTER (OUTPATIENT)
Dept: MRI IMAGING | Age: 84
Discharge: HOME | End: 2019-03-08
Attending: PODIATRIST | Admitting: INTERNAL MEDICINE
Payer: MEDICARE

## 2019-03-08 VITALS
SYSTOLIC BLOOD PRESSURE: 140 MMHG | DIASTOLIC BLOOD PRESSURE: 80 MMHG | RESPIRATION RATE: 16 BRPM | HEIGHT: 65 IN | HEART RATE: 80 BPM | TEMPERATURE: 98 F | WEIGHT: 117 LBS | BODY MASS INDEX: 19.49 KG/M2

## 2019-03-08 DIAGNOSIS — M86.172 ACUTE OSTEOMYELITIS OF LEFT FOOT (HCC): ICD-10-CM

## 2019-03-08 DIAGNOSIS — N18.30 KIDNEY DISEASE, CHRONIC, STAGE III (GFR 30-59 ML/MIN) (HCC): ICD-10-CM

## 2019-03-08 DIAGNOSIS — E11.3493 TYPE 2 DIABETES MELLITUS WITH BOTH EYES AFFECTED BY SEVERE NONPROLIFERATIVE RETINOPATHY WITHOUT MACULAR EDEMA, WITH LONG-TERM CURRENT USE OF INSULIN (HCC): ICD-10-CM

## 2019-03-08 DIAGNOSIS — I10 ESSENTIAL HYPERTENSION WITH GOAL BLOOD PRESSURE LESS THAN 140/90: ICD-10-CM

## 2019-03-08 DIAGNOSIS — I73.9 CLAUDICATION (HCC): ICD-10-CM

## 2019-03-08 DIAGNOSIS — M86.8X7 OTHER OSTEOMYELITIS OF LEFT FOOT (HCC): Primary | ICD-10-CM

## 2019-03-08 DIAGNOSIS — Z79.4 TYPE 2 DIABETES MELLITUS WITH BOTH EYES AFFECTED BY SEVERE NONPROLIFERATIVE RETINOPATHY WITHOUT MACULAR EDEMA, WITH LONG-TERM CURRENT USE OF INSULIN (HCC): ICD-10-CM

## 2019-03-08 PROCEDURE — 99214 OFFICE O/P EST MOD 30 MIN: CPT | Performed by: INTERNAL MEDICINE

## 2019-03-08 PROCEDURE — 73718 MRI LOWER EXTREMITY W/O DYE: CPT | Performed by: PODIATRIST

## 2019-03-08 PROCEDURE — G0463 HOSPITAL OUTPT CLINIC VISIT: HCPCS | Performed by: INTERNAL MEDICINE

## 2019-03-09 NOTE — PROGRESS NOTES
HPI:    Patient ID: Sonu Patino is a 80year old female. Presents for follow-up on multiple medical condition, concern of osteomyelitis    HPI  Patient is here accompanied by her daughter, patient is more forgetful lately. She is hard of hearing. TABLET BY MOUTH DAILY Disp: 30 tablet Rfl: 5   ATORVASTATIN 10 MG Oral Tab TAKE 1 TABLET BY MOUTH DAILY AT BEDTIME Disp: 90 tablet Rfl: 1   Blood Glucose Monitoring Suppl (Support[employer] CONTOUR MONITOR) Does not apply Device USE TO test THREE TIMES DAILY Disp:  Rf present. Pulmonary/Chest: Effort normal. No respiratory distress. She has no wheezes. She has no rales. She exhibits no tenderness. Neurological: She is alert and oriented to person, place, and time. No cranial nerve deficit or motor deficit.    Skin: Sk

## 2019-03-13 ENCOUNTER — HOSPITAL ENCOUNTER (OUTPATIENT)
Dept: ULTRASOUND IMAGING | Facility: HOSPITAL | Age: 84
Discharge: HOME | End: 2019-03-13
Attending: INTERNAL MEDICINE
Payer: MEDICARE

## 2019-03-13 ENCOUNTER — TELEPHONE (OUTPATIENT)
Dept: INTERNAL MEDICINE CLINIC | Facility: CLINIC | Age: 84
End: 2019-03-13

## 2019-03-13 DIAGNOSIS — I73.9 CLAUDICATION (HCC): ICD-10-CM

## 2019-03-13 PROCEDURE — 93923 UPR/LXTR ART STDY 3+ LVLS: CPT | Performed by: INTERNAL MEDICINE

## 2019-03-13 NOTE — TELEPHONE ENCOUNTER
Spoke to Dr. Christiana Rodriguez, patient has severe arterial disease in the left side, patient needs angiography. Spoke to daughter Patricio Ibarra about test results.   Advised that patient sees interventional radiologist Dr. Christiana Rodriguez or  [facility] phone number to make an appointme

## 2019-03-20 ENCOUNTER — APPOINTMENT (OUTPATIENT)
Dept: LAB | Age: 84
End: 2019-03-20
Attending: INTERNAL MEDICINE
Payer: MEDICARE

## 2019-03-20 ENCOUNTER — TELEPHONE (OUTPATIENT)
Dept: INTERNAL MEDICINE CLINIC | Facility: CLINIC | Age: 84
End: 2019-03-20

## 2019-03-20 DIAGNOSIS — N18.30 KIDNEY DISEASE, CHRONIC, STAGE III (GFR 30-59 ML/MIN) (HCC): ICD-10-CM

## 2019-03-20 DIAGNOSIS — E86.0 DEHYDRATION: Primary | ICD-10-CM

## 2019-03-20 LAB
ANION GAP SERPL CALC-SCNC: 8 MMOL/L (ref 0–18)
BUN BLD-MCNC: 21 MG/DL (ref 7–18)
BUN/CREAT SERPL: 17.6 (ref 10–20)
CALCIUM BLD-MCNC: 9.3 MG/DL (ref 8.5–10.1)
CHLORIDE SERPL-SCNC: 110 MMOL/L (ref 98–107)
CO2 SERPL-SCNC: 27 MMOL/L (ref 21–32)
CREAT BLD-MCNC: 1.19 MG/DL (ref 0.55–1.02)
GLUCOSE BLD-MCNC: 129 MG/DL (ref 70–99)
OSMOLALITY SERPL CALC.SUM OF ELEC: 305 MOSM/KG (ref 275–295)
POTASSIUM SERPL-SCNC: 4.3 MMOL/L (ref 3.5–5.1)
SODIUM SERPL-SCNC: 145 MMOL/L (ref 136–145)

## 2019-03-20 PROCEDURE — 36415 COLL VENOUS BLD VENIPUNCTURE: CPT

## 2019-03-20 PROCEDURE — 80048 BASIC METABOLIC PNL TOTAL CA: CPT

## 2019-03-20 RX ORDER — METOPROLOL SUCCINATE 100 MG/1
100 TABLET, EXTENDED RELEASE ORAL DAILY
Qty: 90 TABLET | Refills: 3 | Status: ON HOLD | OUTPATIENT
Start: 2019-03-20 | End: 2019-01-01

## 2019-03-23 RX ORDER — PEN NEEDLE, DIABETIC
NEEDLE, DISPOSABLE MISCELLANEOUS
Qty: 180 EACH | Refills: 3 | Status: SHIPPED | OUTPATIENT
Start: 2019-03-23

## 2019-04-15 NOTE — TELEPHONE ENCOUNTER
Spoke to Masoud, advised that patient should not take clindamycin, I spoke to patient's daughter she will try to get culture results from podiatry office to my attention so I can try to switch antibiotic appropriately.   I advised daughter that  patient shou

## 2019-04-15 NOTE — TELEPHONE ENCOUNTER
Asa Almanza  from [facility] and states pt has been seeing Dr Alfredo Rutledge DPM and he prescribed pt with Clindamycin 600mg TID for 6 weeks for ulcer on left toe. LPN noted  Pt with rashes on her trunk and arms , denies itchiness.  Pt has

## 2019-04-19 NOTE — PROGRESS NOTES
HPI:    Patient ID: Varinder Lorenz is a 80year old female.   Presents for follow-up of dehydration, peripheral vascular disease,    HPI  Patient developed sore left big toe was under care of podiatrist for a long time, podiatrist is out of town, she und by mouth daily.  Cancel previous sig Disp: 90 tablet Rfl: 3   NOVOLOG MIX 70/30 FLEXPEN (70-30) 100 UNIT/ML Subcutaneous Suspension Pen-injector INJECT 30 UNITS SQ BEFORE BREAKFAST AND 15 UNITS BEFORE DINNER Disp: 15 mL Rfl: 2   ASPIRIN ADULT LOW STRENGTH 8 She has no wheezes. She has no rales. Musculoskeletal:      Right shoulder: She exhibits normal strength.               ASSESSMENT/PLAN:   Other osteomyelitis of left foot (hcc) condition unknown at this point, will await podiatry advice about the [facility]

## 2019-04-24 NOTE — TELEPHONE ENCOUNTER
Patients daughter states there is no order on the chart for MRI.      Per Daughter Dr. Jeanna Hall was supposed to be ordering MRI after speaking with patients podiatrist.

## 2019-05-07 NOTE — TELEPHONE ENCOUNTER
Refill passed per [facility], [facility] protocol.   Hypertensive Medications  Protocol Criteria:  · Appointment scheduled in the past 6 months or in the next 3 months  · BMP or CMP in the past 12 months  · Creatinine result < 2  Recent Outpatient Visits

## 2019-05-11 NOTE — TELEPHONE ENCOUNTER
Message for daughter Maicol perez to call back, please get the best number call her if she calls back

## 2019-06-06 NOTE — TELEPHONE ENCOUNTER
Left message for the daughter that blood test looking stable, still abnormal kidney function test, patient should continue drinking liquids, we will follow-up with the blood test in 3 months

## 2019-06-09 NOTE — PROGRESS NOTES
HPI:    Patient ID: Luigi Young is a 80year old female.   Presents for follow-up of multiple medical conditions  HPI  Patient reports that she has been doing fair, she is trying her best to drink more water, but states that she is not feeling thirsty tablet Rfl: 5   ATORVASTATIN 10 MG Oral Tab TAKE 1 TABLET BY MOUTH DAILY AT BEDTIME Disp: 90 tablet Rfl: 1   Blood Glucose Monitoring Suppl (Trupanion CONTOUR MONITOR) Does not apply Device USE TO test THREE TIMES DAILY Disp:  Rfl: 3   AMLODIPINE BESYLATE 5 MG place, and time. No cranial nerve deficit or motor deficit. Gait normal.   Skin: Skin is warm. No lesion noted. Psychiatric: She has a normal mood and affect.  Her behavior is normal.          ASSESSMENT/PLAN:   Type 2 diabetes mellitus with diabetic poly

## 2019-06-18 NOTE — TELEPHONE ENCOUNTER
Please review novolog request; protocol failed. Due to creatinine level.             Lab Results   Component Value Date     (H) 06/04/2019    BUN 25 (H) 06/04/2019    CREATSERUM 1.54 (H) 06/04/2019    BUNCREA 16.2 06/04/2019    GFRNAA 29 (L) 06/04/2019    GFRAA 34 (L) 06/04/2019    CA 9.7 06/04/2019    ALKPHOS 81 09/30/2016    AST 41 (H) 06/04/2019    ALT 39 06/04/2019    BILT 0.5 06/04/2019    TP 6.9 06/04/2019    ALB 3.9 06/04/2019     06/04/2019    K 4.9 06/04/2019     06/04/2019    CO2 26.0 06/04/2019    GLOBULIN 3.0 06/04/2019    AGRATIO 1.6 09/30/2016    ANIONGAP 9 06/04/2019    OSMOCALC 306 (H) 06/04/2019

## 2019-06-25 NOTE — TELEPHONE ENCOUNTER
Refill passed per [facility], [facility] protocol.   Hypertensive Medications  Protocol Criteria:  · Appointment scheduled in the past 6 months or in the next 3 months  · BMP or CMP in the past 12 months  · Creatinine result < 2  Recent Outpatient Visits 06/04/2019

## 2019-07-06 NOTE — TELEPHONE ENCOUNTER
Review pended refill request as it does not fall under a protocol.   Requested Prescriptions     Pending Prescriptions Disp Refills   • GLUCOSE BLOOD In Vitro Strip [Pharmacy Med Name: Clip 50'S MEDI] 300 each      Sig: Test blood sugar three times daily

## 2019-07-16 NOTE — TELEPHONE ENCOUNTER
Refill passed per [facility], [facility] protocol.   Refill Protocol Appointment Criteria  · Appointment scheduled in the past 6 months or in the next 3 months  Recent Outpatient Visits            1 month ago Type 2 diabetes mellitus with diabetic polyneuropathy,

## 2019-07-23 NOTE — TELEPHONE ENCOUNTER
Refill passed per [facility], [facility] protocol.   Cholesterol Medications  Protocol Criteria:  · Appointment scheduled in the past 12 months or in the next 3 months  · ALT & LDL on file in the past 12 months  · ALT result < 80  · LDL result <130   Recent Outpat

## 2019-08-14 NOTE — TELEPHONE ENCOUNTER
Refill passed per [facility] protocol.   Hypertensive Medications  Protocol Criteria:  · Appointment scheduled in the past 6 months or in the next 3 months  · BMP or CMP in the past 12 months  · Creatinine result < 2  Recent Outpatient Visits

## 2019-08-15 NOTE — PROGRESS NOTES
Arik  7/18/1926  [id number]    Patient is here for a six month. The patient has the following concerns:   No concerns. Patient is here with daughter Kristen Espinosa and is doing well.      In office the following actions

## 2019-08-15 NOTE — PROGRESS NOTES
Toro Cannon is a 80year old female.   Patient presents with:  Cerumen Impaction: pt presents with Bilateral cerumen impaction       HISTORY OF PRESENT ILLNESS    Patient presents for cerumen removal. No other complaints or concerns at this time    So fever and weight loss. ENMT Negative Drooling. Eyes Negative Blurred vision and vision changes. Respiratory Negative Dyspnea and wheezing. Cardio Negative Chest pain, irregular heartbeat/palpitations and syncope.    GI Negative Abdominal pain and di DAILY AT BEDTIME, Disp: 90 tablet, Rfl: 1  •  ASPIRIN ADULT LOW STRENGTH 81 MG Oral Tab EC, TAKE 1 TABLET BY MOUTH DAILY, Disp: 90 tablet, Rfl: 1  •  Glucose Blood In Vitro Strip, Test blood sugar three times daily, Disp: 300 each, Rfl: 3  •  AMLODIPINE BE

## 2019-09-10 NOTE — PROGRESS NOTES
HPI:    Patient ID: Dana Sawant is a 80year old female. Presents for follow-up on hypertension, diabetes, weight changes    HPI  Patient is here accompanied by her son who lives out of state.   Patient states that she has been doing fair, she has be Disp: 180 each Rfl: 3   METOPROLOL SUCCINATE  MG Oral Tablet 24 Hr Take 1 tablet (100 mg total) by mouth daily.  Cancel previous sig Disp: 90 tablet Rfl: 3   Alcohol Swabs ( ALCOHOL PREP) 70 % Does not apply Pads Take as directed Disp:  Rfl: 99   Tr is normal. Judgment normal.              ASSESSMENT/PLAN:   Type 2 diabetes mellitus with diabetic polyneuropathy, with long-term current use of insulin (hcc)  (primary encounter diagnosis) will check labs CBC CMP hemoglobin A1c, continue low carbohydrate

## 2019-09-26 NOTE — TELEPHONE ENCOUNTER
Refill passed per [facility], [facility] protocol.   Hypertensive Medications  Protocol Criteria:  · Appointment scheduled in the past 6 months or in the next 3 months  · BMP or CMP in the past 12 months  · Creatinine result < 2  Recent Outpatient Visits

## 2019-09-28 NOTE — TELEPHONE ENCOUNTER
Left message for the daughter that blood count CBC is stable hemoglobin A1c is stable and kidney function test slightly improved still abnormal patient should continue to drink enough liquids

## 2019-11-14 NOTE — PROGRESS NOTES
HPI   Vita Garner is a 80year old female here for f/u of Chronic lymphoid leukemia (hcc)  (primary encounter diagnosis)     States has infection on the L toe on the toes. Denies fevers or chills, no night sweats. Wt stable.   States she e SUCCINATE  MG Oral Tablet 24 Hr Take 1 tablet (100 mg total) by mouth daily.  Cancel previous sig 90 tablet 3   • Blood Glucose Monitoring Suppl (Synappio CONTOUR MONITOR) Does not apply Device USE TO test THREE TIMES DAILY  3   • Alcohol Swabs [facility] microscopic angitis   • Breast Cancer Daughter 40   • Other (alive and well [Other]) Son          PHYSICAL EXAM:    /51 (BP Location: Left arm, Patient Position: Sitting, Cuff Size: adult)   Pulse 55   Temp 98 °F (36.7 °C) (Oral)   Resp 18   H 247.0 235.0 244.0   Prelim Neutrophil Abs      1.50 - 7.70 x10 (3) uL 9.31 (H) 7.93 (H) 6.26   Neutrophils Absolute      1.50 - 7.70 x10(3) uL  7.93 (H) 6.26   Lymphocytes Absolute      1.00 - 4.00 x10(3) uL  7.55 (H) 7.09 (H)   Monocytes Absolute      0.1

## 2019-11-27 NOTE — TELEPHONE ENCOUNTER
Spoke tpo dr Paula Darling podiatrist  Who treat pt  For   Wound  On the  Foot he ffels that wound  Will not heal  Without    Vascular intervention, he  advisies  Admission to the  Hospital? He  Spoke  Already to pt  Daughter tatiana  , who is aware of his  Recommen

## 2019-11-29 NOTE — CM/SW NOTE
Spoke with Alcides-MARTINA he is going to notify Mandaen of referral- [facility] called 5 different numbers and nurse with no answer. Asked they call back the [facility] team to make sure they can accommodate. Daily dry dressing change around toe.  Ju

## 2019-11-29 NOTE — CM/SW NOTE
Met with patient and patient's daughter at bedside. [place] [address]- seeing Dr. Bess Lopez outpatient at this time.      HH discussed options counseling given- they choose [employer] unless they are unable to [employer]

## 2019-11-29 NOTE — CM/SW NOTE
[facility] contacted for new referral- checking on wound care RN availability. VM left with RN Adry Albright at [facility].

## 2019-11-29 NOTE — ED PROVIDER NOTES
Patient Seen in: [facility] AND [facility] Emergency Department      History   Patient presents with:  Wound  Foot Pain    Stated Complaint: right foot wound    HPI    History is provided by patient, patient's daughter.     45-year-old female with history of diab Cardiovascular: Negative for chest pain. Gastrointestinal: Negative for abdominal pain, diarrhea, nausea and vomiting. Genitourinary: Negative for dysuria, flank pain and frequency. Musculoskeletal: Negative for back pain.    Skin: Positive for woun Skin:     General: Skin is warm and dry. Findings: No rash. Neurological:      Mental Status: She is alert and oriented to person, place, and time.       Comments: 5/5 strength in b/l UEs and LEs, normal sensation in all extremities, normal finger Prelim 7.82 (H) 1.50 - 7.70 x10 (3) uL    Neutrophil Absolute 7.82 (H) 1.50 - 7.70 x10(3) uL    Lymphocyte Absolute 6.55 (H) 1.00 - 4.00 x10(3) uL    Monocyte Absolute 1.04 (H) 0.10 - 1.00 x10(3) uL    Eosinophil Absolute 0.20 0.00 - 0.70 x10(3) uL    Baso cellulitis.             Disposition and Plan     Clinical Impression:  Gangrene of toe of left foot ([address]  (primary encounter diagnosis)    Disposition:  Discharge  11/29/2019 12:19 pm    Follow-up:  Sharron Rosa MD  [address] [place] [id number]  3

## 2019-11-29 NOTE — ED INITIAL ASSESSMENT (HPI)
Patient here with c/o right foot wound x 2.5 weeks. Patient saw podiatrist at that time and given abx and wound tx. Patient states wound worsend over the last few days. Denies fevers. Patient is diabetic.

## 2019-11-29 NOTE — ED NOTES
Patient cleared for discharge by MD. Schroeders with patient. Patient discharge instructions reviewed with patient including when and how to follow up  with healthcare provider and when to seek medical treatment. Peripheral IV removed.

## 2019-11-29 NOTE — CM/SW NOTE
Spoke with Sheila RN at [facility]- she gives the number [phone] intake number called again. Spoke with intake RN: Osvaldo Lewis she will check to make sure referral is received and call back. She states the [place] wound nurse is a good idea.

## 2019-11-29 NOTE — CM/SW NOTE
11/29/19 1200   CM/SW Screening   Referral Source Family; ;Physician   Patient's Mental Status Alert;Oriented   Patient's Home Environment Condo/Apt with elevator   Number of Levels in Home 1   Patient lives with Alone  ([place] with some assistan

## 2019-12-03 NOTE — TELEPHONE ENCOUNTER
Spoke to the daughter 6 days ago, plan was after the holidays patient will go in the emergency room, patient was seen in the ER past Friday

## 2019-12-04 NOTE — TELEPHONE ENCOUNTER
Spoke to daughter of pt, Gisel Marvincathi, and she states pt has gangrene of left 3rd toe. Just finished Cipro after taking it for 2-3 weeks, per Gisel Villalobos. Denies any drainage or fever. [address] [facility] Wound nurse is seeing pt.  Appt scheduled for tomorrow at 10:15 AM at [facility]

## 2019-12-04 NOTE — TELEPHONE ENCOUNTER
Pt. is a diabetic and has a ganglion cyst on toe. Should pt be seen sooner then 12/16/19 for Consult?

## 2019-12-05 NOTE — PROGRESS NOTES
Dana Sawant is a 80year old female. Patient presents with:  Consult: pt was in ED last friday, 11/29/19. right third toe, wound. was told dry gangrene in the ED.  there is a travelling podiatrist that visits pt where she lives, had been taking care o (VERNON CONTOUR MONITOR) Does not apply Device USE TO test THREE TIMES DAILY  3   • Alcohol Swabs (SM ALCOHOL PREP) 70 % Does not apply Pads Take as directed  99   • PEG 3350 Oral Powd Pack Take 17 g by mouth twice a week.  8 each 11   • TraMADol HCl 50 MG O Alcohol use: No        Alcohol/week: 0.0 standard drinks      Drug use: No    Other Topics      Concerns:        Caffeine Concern: Yes          1.5 cups coffee, tea daily          REVIEW OF SYSTEMS:   Review of Systems  Today reviewed systens as documented soon as possible. I cautioned him on the signs of this turning into more of a wet process and that she must present to the emergency department and have me paged should they occur.   They understood for right now she was told to stay off the foot she was g

## 2019-12-05 NOTE — TELEPHONE ENCOUNTER
Daughter states the first opening with Dr Jefry Shah is 12/16 - did he want pt to be seen sooner ? - if so pls call Dr Jefry Shah

## 2019-12-09 NOTE — PROGRESS NOTES
HPI:    Patient ID: Rosalba Teague is a 80year old female. Presents for evaluation of the sore on the left foot    HPI  Patient presents for evaluation of the left foot cellulitis.   Patient has longstanding history of hypertension diabetes mellitus, h for polydipsia and polyphagia  Integumentary:  Negative for pruritus and rash  Neurological:  Negative for gait disturbance, paresthesias.    Psychiatric:  Negative for inappropriate interaction and psychiatric symptoms       Current Outpatient Medications distress. HENT:   Head: Normocephalic and atraumatic. Mouth/Throat: Oropharynx is clear and moist. No posterior oropharyngeal erythema. Eyes: Pupils are equal, round, and reactive to light.  Conjunctivae and EOM are normal.   Neck: Normal range of mot

## 2019-12-11 NOTE — TELEPHONE ENCOUNTER
Spoke to patient and daughter Javon Diaz advised that blood test shows improved kidney function test, encourage patient to drink enough liquids. She is scheduled for angiogram on 12/13/2019 by Dr. Cisco Ferrari.   Spoke to nursing staff at [facility] spoke to Jeannette Wilder

## 2019-12-16 NOTE — TELEPHONE ENCOUNTER
Daughter came to pick pt up for 9 am appt this morning and she was not ready to go due to being hyperglycemic - she is eating now and will not be able to come in until later this morning - asking if she can be added onto schedule later this morning

## 2019-12-16 NOTE — PROGRESS NOTES
Gudelia Gomez is a 80year old female. Patient presents with: Follow - Up: LOV 12/5/19. pt thinks her toe has gotten \"a little better everyday\" pt denies drainage. pt had angiogram/angioplasty on 12/13/19 with Dr Ivonne Sultana.  pt still has pain in her foot, drop to eye nightly.           Past Medical History:   Diagnosis Date   • CLL (chronic lymphocytic leukemia) ([facility] 05/21/2012    medication; Caryle Maffucci on 2yrs/off 2 yrs   • Colon cancer [facility]) 1989    bowel resection   • Diabetes ([facility]    • Diabetes mellitus exertion  CARDIOVASCULAR: denies chest pain on exertion  GI: denies abdominal pain and denies heartburn  NEURO: denies headaches    EXAM:   There were no vitals taken for this visit.   Physical Exam  GENERAL: well developed, well nourished, in no apparent d

## 2019-12-17 NOTE — TELEPHONE ENCOUNTER
Spoke to Darlene Romero from interventional radiology, she confirmed that patient should be taking Plavix and baby aspirin, spoke to patient's daughter Clyde Lewis about this, is here to clarify at the next follow-up visit with interventional radiology at length of

## 2019-12-17 NOTE — TELEPHONE ENCOUNTER
[facility] calling and states the information faxed over need to be re fax all questions was not answer, the pages was cut off  and she don't have Plavix on her medication list if you want to add it you need to call [address] [place] [address] Dr silver

## 2019-12-17 NOTE — TELEPHONE ENCOUNTER
Sheila Ortiz/ RN of [employer] ([address]) is requesting copy of signed order with signature to be re-faxed #[phone]. Sheila states order was cut off and that is why she is requesting a clear copy.  Sheila also states there was a question regarding Alexus

## 2019-12-20 NOTE — TELEPHONE ENCOUNTER
Sheila Ortiz/ RN called in requesting to speak to Edgar directly. She needs orders signed and faxed to number below. Fax# [phone]    She is requesting a call directly.      Sheila  #[phone]

## 2019-12-20 NOTE — TELEPHONE ENCOUNTER
Spoke to nurse Kaylyn Virk, reviewed necessary corrections needs to be made in patient's order, created letter will be faxed to facility where patient resides

## 2019-12-21 PROBLEM — R41.0 DISORIENTATION: Status: ACTIVE | Noted: 2019-01-01

## 2019-12-21 PROBLEM — R73.9 HYPERGLYCEMIA: Status: ACTIVE | Noted: 2019-01-01

## 2019-12-21 NOTE — TELEPHONE ENCOUNTER
Spoke to the daughter, patient admitted to the hospital with a heart block, had hard time deciding pacemaker but now she made her decision in the waiting for procedure to happen.

## 2019-12-21 NOTE — TELEPHONE ENCOUNTER
patient daughter calling and states she have some medical issue to discuss with you regarding mother      Please advise   #[phone]

## 2019-12-21 NOTE — PHYSICAL THERAPY NOTE
Orders received for PT eval.     Per RN Antwan's note from this morning: \"Patient transferred from floor alert and talkative. Patient having multiple pauses in a.fibb with decreased consciousness but not complete loss during larger pauses.   Patient started

## 2019-12-21 NOTE — PLAN OF CARE
Problem: Patient Centered Care  Goal: Patient preferences are identified and integrated in the patient's plan of care  Description  Interventions:  - What would you like us to know as we care for you?  I live in an assisted living facility  - Provide time medications. Instructed to call back at that time. Family and patient do not recall what medications the patient takes. Monitoring patient.

## 2019-12-21 NOTE — ED INITIAL ASSESSMENT (HPI)
Pt presents to ED via EMS from [address] [place] [address]. Per medics pt had a bs of 400 at the nh and was given insulin and it came down to the 300s. Pt denies sob, cp or pain. Pt alert and oriented to self, birthday, and situation. Pt daughter at bedside.  Per

## 2019-12-21 NOTE — H&P
[facility]    PATIENT'S NAME: Afua Dubon   ATTENDING PHYSICIAN: Sergey Pinto MD   PATIENT ACCOUNT#:   [id number]    LOCATION:  [address] [place] [address] RECORD #:   [id number]       YOB: 1926  ADMISSION DATE:       12/20 telemetry unit for further evaluation and treatment.   When I saw the patient, she was awake and alert, unable to provide information about the episode that occurred as she was amnestic for them; also, for the ones that occurred in the emergency room as wel disease; he was, I guess, considerably younger    SOCIAL HISTORY:  The patient never smoked. Does not drink alcohol or use drugs. 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     REVIEW OF SYSTEMS:  Twelve systems were 2.3.  Lactic acid level was initially 3.4, but repeated within an hour was down to 1.1. White count was 21,000 with hemoglobin 9.8 and a platelet count of 932,304. There were 65% neutrophils.     Chest x-ray:  Revealed no evidence of alveolar consolidatio with IV medications if necessary. 5.   Diabetes. We will keep n.p.o.  Accu-Cheks q.6 h. Cover with sliding scale insulin. 6.   History of meningioma.   Before it was clear that the patient's heart rate was decreased, I was wondering if the meningiomas c

## 2019-12-21 NOTE — OPERATIVE REPORT
Procedure:    1. Dual Chamber Pacer Implantation  2. Lead Fluoroscopy  3. Administration of moderate conscious sedation    Indication:    1. SSS with 17 second sinus arrest and ventricular standstill (post conversion pause)  2. PAF   3.   Normal LVEF in stable throughout, there were no sedation related issues.      Atrial lead parameters:   afib 1.4 mV;  321 ohms  Ventricular lead parameters:  R = 10 mV;  663 ohms;  0.4 @ 0.5 msec    Successful DDDR pacer implant    PLAN:  Rec[facility] today  [facility]

## 2019-12-21 NOTE — SLP NOTE
Pt currently NPO pending placement of pacemaker. RN without verbal complaints of dysphagia with observed med pass this AM. Will follow in PM as able if/when patient appropriate for PO trials and evaluation.      Thank you,  Chintan Flores MA, [address]

## 2019-12-21 NOTE — PLAN OF CARE
A&ox4, forgetful  Arrived to the unit w/ symptomatic tachy-nemo afib with long pauses (see chart), max 17 seconds, MD notified and dopamine gtt started (@ 7.5 mcg/kg/min)  3L NC for SOB w/ pauses  Purwick  Bedrest until PPM insertion today, consent on mark

## 2019-12-21 NOTE — ED NOTES
Pt having periods of gasping for air then being somewhat dazed. Per pt family this has happened 3 times. This RN witnessed one time of the pt looking up to the ceiling and not responding to verbal for about 10 seconds.  Pt then came to and was alert and aj

## 2019-12-21 NOTE — CONSULTS
[facility] Cardiology Consult Note    [facility] Patient Status:  Inpatient    1926 MRN [id number]   Location [facility] 2W/SW Attending Maritza Holbrook MD   Hosp Day # 0 PCP Sandra Escamilla MD     80year old female, consulted for b History:   Procedure Laterality Date   • CHOLECYSTECTOMY      cholecystitis   • COLECTOMY     • COLONOSCOPY  9/22/2010   • COLONOSCOPY & POLYPECTOMY  2010   • HYSTERECTOMY  1975   • KIDNEY SURGERY Right 1950s    pyeloplasty   • UPPER GI ENDOSCOPY,BIOPSY  2

## 2019-12-21 NOTE — ED PROVIDER NOTES
Patient Seen in: [facility] AND [facility] Emergency Department      History   Patient presents with:  Hyperglycemia  Altered Mental Status    Stated Complaint:     HPI    60-year-old female with diabetes on NovoLog 70/30 presenting for evaluation of hyperglycem as noted above.     Physical Exam     ED Triage Vitals   BP 12/20/19 2021 143/51   Pulse 12/20/19 2021 58   Resp 12/20/19 2021 18   Temp 12/20/19 2024 100 °F (37.8 °C)   Temp src 12/20/19 2024 Temporal   SpO2 12/20/19 2021 96 %   O2 Device 12/20/19 2021 Non BLOOD GAS - Abnormal; Notable for the following components:    Venous pH 7.27 (*)     Venous pO2 72 (*)     Venous Bicarbonate 18.2 (*)     Blood Gas Base Excess -8.4 (*)     Venous O2 Saturation 97.9 (*)     All other components within normal limits   URI ---------                               -----------         ------                     CBC W/ DIFFERENTIAL[[id number]]          Abnormal            Final result                 Please view results for these tests on the individual orders.    SCAN SLIDE   V which is in remission. D/w Dr. Becka Hayes for consultation who does not recommend acyclovir at this time given her normal neurologic exam between these episodes, encephalitis is less likely.   Admission disposition: 12/21/2019  2:26 AM         D/w Dr. Cyndy Millan

## 2019-12-21 NOTE — CONSULTS
Neurology Inpatient Consult Note    Canelo Barr : 1926   Referring Physician: Dr. Jamey Torres  HPI:     Canelo Barr is a 80year old female who is being seen in neurologic evaluation.     Patient being seen in evaluation for fluc [Other]) Daughter         microscopic angitis   • Breast Cancer Daughter 40   • Other (alive and well [Other]) Son       Social History:  Social History    Socioeconomic History      Marital status:        Spouse name: Not on file      Number of chil hemispheres-not unexpected for age. 3. Stable chronic calcified cerebral vertex meningiomas. 4. Large vessel intracranial atherosclerosis.     ASSESSMENT AND PLAN:   Altered mental state  Episodes of transient altered awareness  Doubt primary neurologic e

## 2019-12-21 NOTE — OCCUPATIONAL THERAPY NOTE
Pt transferred to CCU with pauses. Currently not appropriate for OT evaluation. Pt scheduled for pacemaker placement 12/21. Orders completed. Will need new orders after procedure with appropriate restrictions.     Glory Wang MA, OTR/L  Occupational The

## 2019-12-21 NOTE — PROGRESS NOTES
[facility] Pharmacy Note:  Renal Dose Adjustment for Metoclopramide (REGLAN)    Lora Jay has been prescribed Metoclopramide (REGLAN) 10 mg every 8 hours as needed for Nausea, vomiting.     Estimated Creatinine Clearance: 18.8 mL/min (A) (based on SCr of 1

## 2019-12-21 NOTE — PROGRESS NOTES
Patient transferred from floor alert and talkative. Patient having multiple pauses in a.fibb with decreased consciousness but not complete loss during larger pauses. Patient started on dopamine gtt and titrated. Dr. Gordy Rueda paged and made aware.   Patient n

## 2019-12-22 NOTE — PROGRESS NOTES
[facility]    Progress Note     Beltran Patricia Danitaliat Patient Status:  Inpatient    1926 MRN [id number]   Location [facility] 2W/SW Attending Mulu Burnett MD   Hosp Day # 1 PCP Francisco Blancas MD       Subjective:   Rachel Mata 5,000 Units Subcutaneous 2 times per day   • Insulin Aspart Pen  1-7 Units Subcutaneous TID CC   • amiodarone HCl  200 mg Oral Daily   • metoprolol Tartrate  25 mg Oral 2x Daily(Beta Blocker)       Current PRN Inpatient Meds:      Normal Saline Flush, acet (52751)    Result Date: 12/21/2019  CONCLUSION:  1. No acute intracranial finding. 2.   Advanced chronic small vessel disease in both cerebral hemispheres-not unexpected for age. 3. Stable chronic calcified cerebral vertex meningiomas.  4. Large vessel intr current POLST form and determine whether it is accurate or not.   For the present, she will remain a Full Code until this is completed.           Greater than 35 minutes spent, >50% spent counseling re: treatment plan and workup    Tim Andino MD  El

## 2019-12-22 NOTE — PLAN OF CARE
A&ox4 but forgetful, more today than yesterday, occasionally forgetting where she is but recalling the events from last night (confusion, upset at nurse, etc) and becoming more upset now, not happy about staying in the hospital again tonight, daughter at b Problem: CARDIOVASCULAR - ADULT  Goal: Maintains optimal cardiac output and hemodynamic stability  Description  INTERVENTIONS:  - Monitor vital signs, rhythm, and trends  - Monitor for bleeding, hypotension and signs of decreased cardiac output  - Evalua

## 2019-12-22 NOTE — PROGRESS NOTES
Speech Pathology    Patient's status reviewed, collaborated with RN. ST ordered per stroke protocol, however stroke workup was negative. Patient has been tolerating PO diet without difficulty. No ST services warranted at this time. Will DC ST order.

## 2019-12-22 NOTE — PROGRESS NOTES
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

## 2019-12-22 NOTE — PROGRESS NOTES
Double RN skin check done prior to transfer off Unit. Skin check performed by this RN and Antwan RN.      Wounds are as follows: all skin intact, mepilex on sacrum for protection  - pacemaker site to MARGRET, steri-strips, mepilex    Will remain available for any

## 2019-12-23 NOTE — PROGRESS NOTES
[facility] Pharmacy Consult Note:  Antibiotic Dosing    Pharmacy was consulted to dose Piperacillin/tazobactam (Zosyn) for treatment of pneumonia by Dr. Dangelo Munoz. Body mass index is 21.27 kg/m².   Wt Readings from Last 6 Encounters:  12/23/19 : 56.2 kg (123 lb

## 2019-12-23 NOTE — PLAN OF CARE
Patient was confused overnight and keeps on taking off her nasal cannula. Frequent reorientation given. Noted SOB early this AM after patient removed NC and desaturated to 80's. Placed on nonrebreather with oxygen sating at 97%. Stat CXR done.  IV Lasix giv hemodynamic stability  Description  INTERVENTIONS:  - Monitor vital signs, rhythm, and trends  - Monitor for bleeding, hypotension and signs of decreased cardiac output  - Evaluate effectiveness of vasoactive medications to optimize hemodynamic stability perform as needed  - Assess and instruct to report SOB or any respiratory difficulty  - Respiratory Therapy support as indicated  - Manage/alleviate anxiety  - Monitor for signs/symptoms of CO2 retention  Outcome: Not Progressing

## 2019-12-23 NOTE — RESPIRATORY THERAPY NOTE
Patient received on NR mask 10LPM.Patient taking the mask off continuously. O2 sat on hf nc 15l 86%. Patient placed on VAPOTHERM  20 L,FIO2 60%, O2 sat 96%. DR Juliocesar Dean aware. Patient tolerating well.

## 2019-12-23 NOTE — WOUND PROGRESS NOTE
WOUND CARE NOTE    History:  Past Medical History:   Diagnosis Date   • CLL (chronic lymphocytic leukemia) ([facility] 05/21/2012    medication; Caryle Maffucci on 2yrs/off 2 yrs   • Colon cancer [facility]) 1989    bowel resection   • Diabetes ([facility]    • Diabetes mellitus Results   Component Value Date    WBC 18.1 (H) 12/23/2019    HGB 9.8 (L) 12/23/2019    HCT 31.2 (L) 12/23/2019    .0 12/23/2019    CREATSERUM 1.29 (H) 12/23/2019    BUN 48 (H) 12/23/2019     12/23/2019    K 4.5 12/23/2019     (H) 12/23/2

## 2019-12-23 NOTE — PROGRESS NOTES
[facility]    Cardiology Progress Note    [facility] Patient Status:  Inpatient    1926 MRN [id number]   Location [facility] 3W/SW Attending Korin Nguyen MD   Hosp Day # 2 PCP Mansi Gutiérrez MD       Subjective: C/D/I  Assessment and Plan:     Acute respiratory distress likely 2/2 acute on chronic diastolic heart failure and pneumonia  - CXR with pulmonary congestion, pBNP 13,000  - lasix 40 mg IV given this AM - will schedule lasix 40 mg IV BID  - wean oxygen as (cpt=71046)    Result Date: 12/22/2019  CONCLUSION:  1. Increasing layering left pleural effusion and associated compressive atelectasis, with or without superimposed pneumonia.   2. Cardiomegaly with pulmonary vascular congestion and mild, diffuse pulmonar

## 2019-12-23 NOTE — PLAN OF CARE
Transfer from CCU. Pt was alert and oriented 2-3. Very confused and seeing objects and people that are not there. She was agitated for a period of time and yelled at staff. She is forgetful and needs frequent reminders.  She was educated about calling for h cardiac output and hemodynamic stability  Description  INTERVENTIONS:  - Monitor vital signs, rhythm, and trends  - Monitor for bleeding, hypotension and signs of decreased cardiac output  - Evaluate effectiveness of vasoactive medications to optimize hemo

## 2019-12-23 NOTE — CM/SW NOTE
Received MDO for HH eval and POLST form. ZHANNA spoke w/ pt's dtrs, Deann and Maicol perez in pt's room. Maicol perez verified pt's address and confirmed that pt currently resides at [facility].  [place] also informed SW that [facility] comes to treat pt's toe wound

## 2019-12-23 NOTE — PROGRESS NOTES
[facility]    Progress Note     Ruby Dunn Patient Status:  Inpatient    1926 MRN [id number]   Location [facility] 2W/SW Attending Roosvelt Habermann, MD   Hosp Day # 2 PCP Pushpa Jaramillo MD       Subjective:   Alex Szymanski Oral Daily   • aspirin  300 mg Rectal Daily    Or   • aspirin  325 mg Oral Daily   • Senna  17.2 mg Oral Nightly   • famoTIDine  20 mg Oral Daily    Or   • famoTIDine  20 mg Intravenous Daily   • Heparin Sodium (Porcine)  5,000 Units Subcutaneous 2 times p Date: 12/23/2019  CONCLUSION:  1. Worsening CHF when compared to December 22, 2019. 2. Cardiomegaly. 3. Atherosclerosis. 4. Demineralization. 5. Scoliosis. 6. Osteoarthritis. 7. Pacemaker.  8. A preliminary report was submitted and there is agreement withou need for [facility]  -transfered to tele  -cardiology following    Acute resp failure  2/2 PNA and acute decompensation of diastolic heart failure  -IV lasix BID  -zosyn   -nebs BID    Possible osteomylitis L 3rd toe  -check x rays  -local wound care  -fu podiatry

## 2019-12-24 NOTE — PROGRESS NOTES
[place] [facility] - [facility]    Progress Note     Beltraneugnee Gomez Danitaliat Patient Status:  Inpatient    1926 MRN [id number]   Location [facility] 2W/SW Attending Esha Santiago MD   Hosp Day # 3 PCP Tyrell Vickers MD       Subjective:   Elvia Greer famoTIDine  20 mg Oral Daily    Or   • famoTIDine  20 mg Intravenous Daily   • Heparin Sodium (Porcine)  5,000 Units Subcutaneous 2 times per day   • Insulin Aspart Pen  1-7 Units Subcutaneous TID CC   • amiodarone HCl  200 mg Oral Daily   • metoprolol Tar Chest Ap/pa (1 View) (cpt=71045)    Result Date: 12/23/2019  CONCLUSION:  1. Worsening CHF when compared to December 22, 2019. 2. Cardiomegaly. 3. Atherosclerosis. 4. Demineralization. 5. Scoliosis. 6. Osteoarthritis. 7. Pacemaker.  8. A preliminary report Joe Onofre MD on 12/21/2019 at 6:03          Xr Chest Ap Portable  (cpt=71045)    Result Date: 12/21/2019  CONCLUSION:  1. Subsegmental atelectasis and or scarring in the left lower lobe. 2. Stable cardiomegaly. 3. Atherosclerosis aorta.  4. Old proxima

## 2019-12-24 NOTE — CM/SW NOTE
11: 25AM  Received notice from pt's RN that PT/OT rec SNF. SW spoke w/ pt's dtr and DREW in her room. SW addressed and answered all questions regarding SNF referral process and d/c planning.  SW provided pt's dtr w/ SNF list and encouraged them to discuss

## 2019-12-24 NOTE — PLAN OF CARE
Problem: Patient Centered Care  Goal: Patient preferences are identified and integrated in the patient's plan of care  Description  Interventions:  - What would you like us to know as we care for you?  I live in an assisted living facility  - Provide time arterial and/or venous puncture sites for bleeding and/or hematoma  - Assess quality of pulses, skin color and temperature  - Assess for signs of decreased coronary artery perfusion - ex.  Angina  - Evaluate fluid balance, assess for edema, trend weights  O removing venturi mask, but did not tolerate 15 liters Nasal Canula. Vapotherm initiated, pt tolerated and keeping canula in place. IV ABX started for sepsis BPA fired and increased oxygen needs.  Jossy Mendoza started due to oxygen needs, not safe to get up at t

## 2019-12-24 NOTE — PROGRESS NOTES
[facility] AND [facility]  [facility] Cardiology Progress Note    Kathywilmer Mccall [facility] Patient Status:  Inpatient    1926 MRN [id number]   Location [facility] 3W/SW Attending Rickey Vaz MD   Hosp Day # 3 PCP Sandra Escamilla MD     Acute respiratory Right 1950s    pyeloplasty   • UPPER GI ENDOSCOPY,BIOPSY  2010     Family History   Problem Relation Age of Onset   • Heart Disorder Mother         CHF   • Heart Disease Father         CAD   • Heart Disease Sister         CAD   • Cancer Sister         lung

## 2019-12-24 NOTE — PHYSICAL THERAPY NOTE
PHYSICAL THERAPY EVALUATION - INPATIENT     Room Number: 338/338-A  Evaluation Date: 12/24/2019  Type of Evaluation: Initial   Physician Order: PT Eval and Treat       Reason for Therapy: Mobility Dysfunction and Discharge Planning    PHYSICAL THERAPY ASS [facility]. She was noted to be hyperglycemic, as well as more fatigued than usual and disoriented. Blood sugars were in the 400s at [facility], improved to 300s with insulin.  In the ED, she was noted to have episodes of decreased responsiveness a checks on her 3x/day.      SUBJECTIVE  I like my bed, pt initially refused to get OOB    PHYSICAL THERAPY EXAMINATION     OBJECTIVE  Precautions: (PM precautions)  Fall Risk: High fall risk    WEIGHT BEARING RESTRICTION  Weight Bearing Restriction: L upper End of Session: Up in chair;Needs met;Call light within reach;RN aware of session/findings;Bracing education provided;Family present    CURRENT GOALS    Goals to be met by: 01/01/20  Patient Goal Patient's self-stated [place] is[place]

## 2019-12-24 NOTE — PLAN OF CARE
Problem: Patient Centered Care  Goal: Patient preferences are identified and integrated in the patient's plan of care  Description  Interventions:  - What would you like us to know as we care for you?  I live in an assisted living facility  - Provide time arterial and/or venous puncture sites for bleeding and/or hematoma  - Assess quality of pulses, skin color and temperature  - Assess for signs of decreased coronary artery perfusion - ex.  Angina  - Evaluate fluid balance, assess for edema, trend weights  O x2-3. Patient can be confused/forgetful at times, pleasant. Patient is on Airvo 20L, tolerating well. Denies any pain. Mac Plush in place wit adequate output. Left arm remained in sling. Pacemaker site clean, dry and intact. No hematoma noted.  Bed alarm on,

## 2019-12-24 NOTE — OCCUPATIONAL THERAPY NOTE
OCCUPATIONAL THERAPY EVALUATION - INPATIENT     Room Number: 338/338-A  Evaluation Date: 12/24/2019  Type of Evaluation: Initial  Presenting Problem: (hyperglycemia, disorientation)    Physician Order: IP Consult to Occupational Therapy  Reason for Therapy List  Principal Problem:    Hyperglycemia  Active Problems:    Disorientation      Past Medical History  Past Medical History:   Diagnosis Date   • [facility] 05/21/2012    medication; Re Shells on 2yrs/off 2 yrs   • Colon canc OF DAILY LIVING ASSESSMENT  AM-PAC ‘6-Clicks’ Inpatient Daily Activity Short Form  How much help from another person does the patient currently need…  -   Putting on and taking off regular lower body clothing?: A Little  -   Bathing (including washing, rin

## 2019-12-25 NOTE — PROGRESS NOTES
[place] [facility] - [facility]    Progress Note     Ruby Patricia Danitaliat Patient Status:  Inpatient    1926 MRN [id number]   Location [facility] 2W/SW Attending Sena Le MD   Hosp Day # 4 PCP Dontae Finley MD       Subjective:   Rufina Shaikh Daily   • aspirin  300 mg Rectal Daily    Or   • aspirin  325 mg Oral Daily   • Senna  17.2 mg Oral Nightly   • famoTIDine  20 mg Oral Daily    Or   • famoTIDine  20 mg Intravenous Daily   • Heparin Sodium (Porcine)  5,000 Units Subcutaneous 2 times per [employer] 12/20/2019    TRIG 65 12/20/2019    A1C 6.9 (H) 12/09/2019       A1c:  Lab Results   Component Value Date    A1C 6.9 (H) 12/09/2019    A1C 6.7 (H) 09/09/2019    A1C 6.9 (H) 06/04/2019       Culture:  Hospital Encounter on 12/20/19   1.  BLOOD CULTURE     St ([id number])    Result Date: 12/21/2019  CONCLUSION:  1. No acute intracranial finding. 2.   Advanced chronic small vessel disease in both cerebral hemispheres-not unexpected for age. 3. Stable chronic calcified cerebral vertex meningiomas.  4. Large vessel intr Certification    Patient will require inpatient services that will reasonably be expected to span two midnight's based on the clinical documentation in H+P.    Based on patients current state of illness, I anticipate that, after discharge, patient will requ

## 2019-12-25 NOTE — PLAN OF CARE
Problem: Patient Centered Care  Goal: Patient preferences are identified and integrated in the patient's plan of care  Description  Interventions:  - What would you like us to know as we care for you?  I live in an assisted living facility  - Provide time arterial and/or venous puncture sites for bleeding and/or hematoma  - Assess quality of pulses, skin color and temperature  - Assess for signs of decreased coronary artery perfusion - ex.  Angina  - Evaluate fluid balance, assess for edema, trend weights  O and combative in evening. Dr. Adriane Hutchins notified, Clare Combs administered as ordered. Patient refused HS accucheck. Patient slept well overnight, bed alarm on, call light within reach. Will continue to monitor.

## 2019-12-25 NOTE — PROGRESS NOTES
[facility]    Progress Note     Ruby Dunn Patient Status:  Inpatient    1926 MRN [id number]   Location [facility] 3W/SW Attending Aishwarya Little MD   Hosp Day # 4 PCP Lary Aguillno MD         Assessment and Plan: atorvastatin  10 mg Oral Nightly   • Clopidogrel Bisulfate  75 mg Oral Daily   • piperacillin-tazobactam  3.375 g Intravenous Q8H   • ipratropium-albuterol  3 mL Nebulization BID   • furosemide  40 mg Intravenous BID (Diuretic)   • Timolol Maleate  1 drop fracture.     Dictated by (CST): Wilmar Cueto MD on 12/23/2019 at 16:35     Approved by (CST): Wilmar Cueto MD on 12/23/2019 at 16:50                  Amy Galindo MD  12/25/2019

## 2019-12-25 NOTE — PLAN OF CARE
Problem: Patient Centered Care  Goal: Patient preferences are identified and integrated in the patient's plan of care  Description  Interventions:  - What would you like us to know as we care for you?  I live in an assisted living facility  - Provide time arterial and/or venous puncture sites for bleeding and/or hematoma  - Assess quality of pulses, skin color and temperature  - Assess for signs of decreased coronary artery perfusion - ex.  Angina  - Evaluate fluid balance, assess for edema, trend weights  O Vapotherm today. Pt Left AC IV infiltrated, PICC nurse placed new IV per US machine. Pt up in chair all day, tolerated well.

## 2019-12-26 NOTE — PLAN OF CARE
Problem: Patient Centered Care  Goal: Patient preferences are identified and integrated in the patient's plan of care  Description  Interventions:  - What would you like us to know as we care for you?  I live in an assisted living facility  - Provide time arterial and/or venous puncture sites for bleeding and/or hematoma  - Assess quality of pulses, skin color and temperature  - Assess for signs of decreased coronary artery perfusion - ex.  Angina  - Evaluate fluid balance, assess for edema, trend weights  O

## 2019-12-26 NOTE — PHYSICAL THERAPY NOTE
PHYSICAL THERAPY TREATMENT NOTE - INPATIENT     Room Number: 338/338-A            Problem List  Principal Problem:    Hyperglycemia  Active Problems:    Disorientation      PHYSICAL THERAPY ASSESSMENT     Patient in bed agreeable for PT/OT session and RN a blankets)?: A Lot   -   Sitting down on and standing up from a chair with arms (e.g., wheelchair, bedside commode, etc.): A Lot   -   Moving from lying on back to sitting on the side of the bed?: A Lot   How much help from another person does the patient c

## 2019-12-26 NOTE — PROGRESS NOTES
[place] [facility] - [facility]    Progress Note     Ruby Patricia Dunn Patient Status:  Inpatient    1926 MRN [id number]   Location [facility] 2W/SW Attending Carlos Flores MD   Hosp Day # 5 PCP Elie Barrera MD       Subjective:   Clarisse Caban 40 mg Oral Daily   • aspirin  300 mg Rectal Daily    Or   • aspirin  325 mg Oral Daily   • Senna  17.2 mg Oral Nightly   • famoTIDine  20 mg Oral Daily    Or   • famoTIDine  20 mg Intravenous Daily   • Heparin Sodium (Porcine)  5,000 Units Subcutaneous 2 t 12/21/2019    LDL 21 12/20/2019    TRIG 65 12/20/2019    A1C 6.9 (H) 12/09/2019       A1c:  Lab Results   Component Value Date    A1C 6.9 (H) 12/09/2019    A1C 6.7 (H) 09/09/2019    A1C 6.9 (H) 06/04/2019       Culture:  Hospital Encounter on 12/20/19   1. ([id number])    Result Date: 12/21/2019  CONCLUSION:  1. No acute intracranial finding. 2.   Advanced chronic small vessel disease in both cerebral hemispheres-not unexpected for age. 3. Stable chronic calcified cerebral vertex meningiomas.  4. Large vessel intr Certification    Patient will require inpatient services that will reasonably be expected to span two midnight's based on the clinical documentation in H+P.    Based on patients current state of illness, I anticipate that, after discharge, patient will requ

## 2019-12-26 NOTE — OCCUPATIONAL THERAPY NOTE
OCCUPATIONAL THERAPY TREATMENT NOTE - INPATIENT        Room Number: 338/338-A           Presenting Problem: (hyperglycemia, disorientation)    Problem List  Principal Problem:    Hyperglycemia  Active Problems:    Disorientation      OCCUPATIONAL THERAPY A ‘6-Clicks’ Inpatient Daily Activity Short Form  How much help from another person does the patient currently need…  -   Putting on and taking off regular lower body clothing?: A Little  -   Bathing (including washing, rinsing, drying)?: A Little  -   Toile 1/3/19  Frequency: 3-5x/week

## 2019-12-26 NOTE — PLAN OF CARE
Jonathan Webster is resting in bed comfortably but confused and needs a lot of redirecting. Patient is alert to self only. Vital signs stable, Medications given and reviewed. Call light in hand and chair alarm is on.      Problem: Patient Centered Care  Goal: Malindae Monitor vital signs, rhythm, and trends  - Monitor for bleeding, hypotension and signs of decreased cardiac output  - Evaluate effectiveness of vasoactive medications to optimize hemodynamic stability  - Monitor arterial and/or venous puncture sites for bl target range  - Assess barriers to adequate nutritional intake and initiate nutrition consult as needed  - Instruct patient on self management of diabetes  Outcome: Progressing

## 2019-12-26 NOTE — PROGRESS NOTES
[place] [facility] - [facility]    Progress Note     Ruby Dunn Patient Status:  Inpatient    1926 MRN [id number]   Location [facility] 3W/SW Attending Marjorie Wilcox MD   Hosp Day # 5 PCP Alan Thakkar MD         Assessment and Plan: Q8H   • ipratropium-albuterol  3 mL Nebulization BID   • Timolol Maleate  1 drop Both Eyes BID   • latanoprost  1 drop Ophthalmic Nightly   • insulin detemir  15 Units Subcutaneous Daily   • amLODIPine Besylate  5 mg Oral BID   • lisinopril  40 mg Oral Judie Kayli

## 2019-12-27 NOTE — PROGRESS NOTES
[place] [facility] - [facility]    Progress Note     Beltran Patricia Dunn Patient Status:  Inpatient    1926 MRN [id number]   Location [facility] 3W/SW Attending Sena Le MD   Hosp Day # 6 PCP Dontae Finley MD         Assessment and Plan: nontender, nondistended, bowel sounds present  Ext:  no clubbing, no cyanosis,no edema  Neuro: no focal deficits  Skin: no rashes or lesions    Scheduled Meds:    • atorvastatin  10 mg Oral Nightly   • Clopidogrel Bisulfate  75 mg Oral Daily   • piperacill 12/26/2019 at 19:34     Approved by (CST):  Lauren Jones MD on 12/26/2019 at 19:36                 WARREN Naylor  S Cardiology  12/27/19

## 2019-12-27 NOTE — PHYSICAL THERAPY NOTE
PHYSICAL THERAPY TREATMENT NOTE - INPATIENT     Room Number: 338/338-A            Problem List  Principal Problem:    Hyperglycemia  Active Problems:    Disorientation      PHYSICAL THERAPY ASSESSMENT   Pt was received in the bed.  Pt is min A with bed nancy +    ACTIVITY TOLERANCE                         O2 WALK           Ambulation oxygen flow (liters per minute): 2      AM-PAC '6-Clicks' INPATIENT SHORT FORM - BASIC MOBILITY  How much difficulty does the patient currently have. ..  -   Turning over in bed (i about 30-45 secs with the RW min A   Goal #4    Goal #4   Current Status    Goal #5 Patient to demonstrate independence with home activity/exercise instructions provided to patient in preparation for discharge.    Goal #5   Current Status In progress   Goal

## 2019-12-27 NOTE — PLAN OF CARE
Problem: Patient Centered Care  Goal: Patient preferences are identified and integrated in the patient's plan of care  Description  Interventions:  - What would you like us to know as we care for you?  I live in an assisted living facility  - Provide time arterial and/or venous puncture sites for bleeding and/or hematoma  - Assess quality of pulses, skin color and temperature  - Assess for signs of decreased coronary artery perfusion - ex.  Angina  - Evaluate fluid balance, assess for edema, trend weights  O and place, forgetful at times, resting in bed. Bed is low and locked in place, call light is within reach, instructed to use call light. Denies chest pain, and shortness of breath. Left arm precautions, sling on. Monitoring pt.

## 2019-12-27 NOTE — PLAN OF CARE
Problem: Patient Centered Care  Goal: Patient preferences are identified and integrated in the patient's plan of care  Description  Interventions:  - What would you like us to know as we care for you?  I live in an assisted living facility  - Provide time arterial and/or venous puncture sites for bleeding and/or hematoma  - Assess quality of pulses, skin color and temperature  - Assess for signs of decreased coronary artery perfusion - ex.  Angina  - Evaluate fluid balance, assess for edema, trend weights  O Patient has gotten up to the chair with therapy. Patient is on zosyn. Patient has denied pain. Left arm in the sling. Patient VS stable. Patient has fall precautions in place. Accu checks stable. Patient has call light within reach.  Patient will call for a

## 2019-12-27 NOTE — CM/SW NOTE
12: 50PM  Received notice from [facility] dominic/ Ismael that pt can be accepted clinically if vapo therm is discontinued.     SW contacted [facility]/Tonie and requested that pt updates be sent to [facility] and [facility] via Hand.    02:10PM  Received call from pt's RN - pt no l

## 2019-12-27 NOTE — PROGRESS NOTES
[place] [facility] - [facility]    Progress Note     Ruby Dunn Patient Status:  Inpatient    1926 MRN [id number]   Location [facility] 3W/SW Attending Adria Payne, [address] [facility] Day # 6 PCP Kiesha Abraham MD       Subjective:   Oliverio Rodriguez Q4H PRN  aspirin 300 MG rectal suppository 300 mg, 300 mg, Rectal, Daily    Or  aspirin tab 325 mg, 325 mg, Oral, Daily  Senna (SENOKOT) tab 17.2 mg, 17.2 mg, Oral, Nightly  Metoclopramide HCl (REGLAN) injection 5 mg, 5 mg, Intravenous, Q8H PRN  famoTIDine (CST): Thuy Alfaro MD on 12/26/2019 at 19:34     Approved by (CST):  Thuy Alfaro MD on 12/26/2019 at 19:36                Results:     CBC:    Lab Results   Component Value Date    WBC 15.5 (H) 12/27/2019    WBC 12.1 (H) 12/25/2019    WBC 13.9 ( answered.          12/27/2019

## 2019-12-28 NOTE — PLAN OF CARE
Patient alert and oriented, co-operative but forgetful and somewhat confused. Family at bedside report that Pankaj Richards is a little more confused today than yesterday. Her WBC has increased significantly from yesterday.  Family updated on the patient's conditi home    Interventions:   - Vapotherm  - PT/ OT eval  - IV ABX  - Purewick  - IV lasix  - See additional Care Plan goals for specific interventions    Outcome: Progressing     Problem: CARDIOVASCULAR - ADULT  Goal: Maintains optimal cardiac output and hemod Progressing     Problem: METABOLIC/FLUID AND ELECTROLYTES - ADULT  Goal: Glucose maintained within prescribed range  Description  INTERVENTIONS:  - Monitor Blood Glucose as ordered  - Assess for signs and symptoms of hyperglycemia and hypoglycemia  - Admin pt/family on patient safety including physical limitations  - Instruct pt to call for assistance with activity based on assessment  - Modify environment to reduce risk of injury  - Provide assistive devices as appropriate  - Consider OT/PT consult to agus

## 2019-12-28 NOTE — PLAN OF CARE
Problem: Patient Centered Care  Goal: Patient preferences are identified and integrated in the patient's plan of care  Description  Interventions:  - What would you like us to know as we care for you?  I live in an assisted living facility  - Provide time arterial and/or venous puncture sites for bleeding and/or hematoma  - Assess quality of pulses, skin color and temperature  - Assess for signs of decreased coronary artery perfusion - ex.  Angina  - Evaluate fluid balance, assess for edema, trend weights  O and place, forgetful at times, resting in bed. Bed is low and locked in place, call light is within reach, instructed to use call light. Denies chest pain, and shortness of breath. Weaned off 02. Left arm precautions, sling on. Monitoring pt.

## 2019-12-29 NOTE — DISCHARGE SUMMARY
Dc summary#[id number]  > 30 min spent on [address] Discharge Diagnoses: sss    Lace+ Score: 81  59-90 High Risk  29-58 Medium Risk  0-28   Low Risk. TCM Follow-Up Recommendation:  LACE > 58:  High Risk of readmission after discharge from the hospital.

## 2019-12-29 NOTE — PROGRESS NOTES
Pt to be discharged to [facility] rehab today as ordered. Report given to [facility]. IV site dc, site CDI, belongings gathered to be sent with pt.

## 2019-12-29 NOTE — CM/SW NOTE
3:42pm Update- RN states the pt is most appropriate for [facility] transport. ZHANNA arranged [facility] for today 12/29 at 5:30pm. PCS form completed and tubed to nurse copies for medical records and the transport agency.  RN to alert pt/family of cost $30 plus $

## 2019-12-29 NOTE — PROGRESS NOTES
[facility]    Progress Note     Beltran Patricia Dunn Patient Status:  Inpatient    1926 MRN [id number]   Location [facility] 3W/SW Attending Colin Vogel Day # 7 PCP Yulia Holt MD        Subjective:     C CBC     Hypertension  -cont BB  -resumed ACEi  -resumed norvasc     Diabetes  -levemir  -sliding scale     History of meningioma  -no acute issues    neutrophilia check ua/bc           VTE P: Heparin      Dispo: Likely to [facility] tomorrow if stable

## 2019-12-29 NOTE — PLAN OF CARE
Problem: Patient Centered Care  Goal: Patient preferences are identified and integrated in the patient's plan of care  Description  Interventions:  - What would you like us to know as we care for you?  I live in an assisted living facility  - Provide time arterial and/or venous puncture sites for bleeding and/or hematoma  - Assess quality of pulses, skin color and temperature  - Assess for signs of decreased coronary artery perfusion - ex.  Angina  - Evaluate fluid balance, assess for edema, trend weights  O ADULT  Goal: Skin integrity remains intact  Description  INTERVENTIONS  - Assess and document risk factors for pressure ulcer development  - Assess and document skin integrity  - Monitor for areas of redness and/or skin breakdown  - Initiate interventions, assessment.  - Educate pt/family on patient safety including physical limitations  - Instruct pt to call for assistance with activity based on assessment  - Modify environment to reduce risk of injury  - Provide assistive devices as appropriate  - Consider

## 2019-12-30 NOTE — PROGRESS NOTES
Pt. Needs apt with device nurse in 1 week also needs apt. With Dr Skyler Crystal in 6 weeks in [facility] office.  Call [facility] at [phone] to confirm apts    Apt made Tue. Jan 7th @11am with APN    Feb.11th @1:15 W/Dr. Skyler Crystal

## 2020-01-01 ENCOUNTER — APPOINTMENT (OUTPATIENT)
Dept: GENERAL RADIOLOGY | Age: 85
DRG: 480 | End: 2020-01-01
Attending: EMERGENCY MEDICINE

## 2020-01-01 ENCOUNTER — OFFICE VISIT (OUTPATIENT)
Dept: INTERNAL MEDICINE CLINIC | Facility: CLINIC | Age: 85
End: 2020-01-01
Payer: MEDICARE

## 2020-01-01 ENCOUNTER — APPOINTMENT (OUTPATIENT)
Dept: CT IMAGING | Facility: HOSPITAL | Age: 85
End: 2020-01-01
Attending: EMERGENCY MEDICINE
Payer: MEDICARE

## 2020-01-01 ENCOUNTER — LAB ENCOUNTER (OUTPATIENT)
Dept: LAB | Age: 85
End: 2020-01-01
Attending: INTERNAL MEDICINE
Payer: MEDICARE

## 2020-01-01 ENCOUNTER — TELEPHONE (OUTPATIENT)
Dept: INTERNAL MEDICINE CLINIC | Facility: CLINIC | Age: 85
End: 2020-01-01

## 2020-01-01 ENCOUNTER — HOSPITAL ENCOUNTER (EMERGENCY)
Facility: HOSPITAL | Age: 85
Discharge: HOME | End: 2020-01-01
Attending: EMERGENCY MEDICINE
Payer: MEDICARE

## 2020-01-01 ENCOUNTER — OFFICE VISIT (OUTPATIENT)
Dept: CARDIOLOGY CLINIC | Facility: CLINIC | Age: 85
End: 2020-01-01
Payer: MEDICARE

## 2020-01-01 ENCOUNTER — TELEPHONE (OUTPATIENT)
Dept: OTHER | Age: 85
End: 2020-01-01

## 2020-01-01 ENCOUNTER — HOSPITAL ENCOUNTER (INPATIENT)
Facility: HOSPITAL | Age: 85
LOS: 1 days | DRG: 871 | End: 2020-01-01
Attending: EMERGENCY MEDICINE | Admitting: HOSPITALIST
Payer: MEDICARE

## 2020-01-01 ENCOUNTER — HOSPITAL ENCOUNTER (OUTPATIENT)
Dept: GENERAL RADIOLOGY | Age: 85
Discharge: HOME | End: 2020-01-01
Attending: PODIATRIST
Payer: MEDICARE

## 2020-01-01 ENCOUNTER — ANESTHESIA (OUTPATIENT)
Dept: SURGERY | Age: 85
DRG: 480 | End: 2020-01-01

## 2020-01-01 ENCOUNTER — APPOINTMENT (OUTPATIENT)
Dept: GENERAL RADIOLOGY | Facility: HOSPITAL | Age: 85
End: 2020-01-01
Attending: EMERGENCY MEDICINE
Payer: MEDICARE

## 2020-01-01 ENCOUNTER — EXTERNAL FACILITY (OUTPATIENT)
Dept: INTERNAL MEDICINE CLINIC | Facility: CLINIC | Age: 85
End: 2020-01-01

## 2020-01-01 ENCOUNTER — OFFICE VISIT (OUTPATIENT)
Dept: PODIATRY CLINIC | Facility: CLINIC | Age: 85
End: 2020-01-01
Payer: MEDICARE

## 2020-01-01 ENCOUNTER — APPOINTMENT (OUTPATIENT)
Dept: GENERAL RADIOLOGY | Age: 85
DRG: 480 | End: 2020-01-01
Attending: ORTHOPAEDIC SURGERY

## 2020-01-01 ENCOUNTER — HOSPITAL ENCOUNTER (EMERGENCY)
Facility: HOSPITAL | Age: 85
Discharge: NURSING, REHAB OR LONG-TERM CARE | End: 2020-01-01
Attending: EMERGENCY MEDICINE
Payer: MEDICARE

## 2020-01-01 ENCOUNTER — APPOINTMENT (OUTPATIENT)
Dept: GENERAL RADIOLOGY | Facility: HOSPITAL | Age: 85
DRG: 871 | End: 2020-01-01
Attending: EMERGENCY MEDICINE
Payer: MEDICARE

## 2020-01-01 ENCOUNTER — SNF VISIT (OUTPATIENT)
Dept: INTERNAL MEDICINE CLINIC | Facility: SKILLED NURSING FACILITY | Age: 85
End: 2020-01-01

## 2020-01-01 ENCOUNTER — APPOINTMENT (OUTPATIENT)
Dept: GENERAL RADIOLOGY | Age: 85
DRG: 480 | End: 2020-01-01
Attending: PODIATRIST

## 2020-01-01 ENCOUNTER — APPOINTMENT (OUTPATIENT)
Dept: ULTRASOUND IMAGING | Facility: HOSPITAL | Age: 85
DRG: 871 | End: 2020-01-01
Attending: EMERGENCY MEDICINE
Payer: MEDICARE

## 2020-01-01 ENCOUNTER — APPOINTMENT (OUTPATIENT)
Dept: ULTRASOUND IMAGING | Facility: HOSPITAL | Age: 85
DRG: 871 | End: 2020-01-01
Attending: HOSPITALIST
Payer: MEDICARE

## 2020-01-01 ENCOUNTER — HOSPITAL ENCOUNTER (INPATIENT)
Age: 85
LOS: 5 days | Discharge: NURSING, REHAB OR LONG-TERM CARE | DRG: 480 | End: 2020-03-19
Attending: EMERGENCY MEDICINE | Admitting: INTERNAL MEDICINE

## 2020-01-01 ENCOUNTER — TELEPHONE (OUTPATIENT)
Dept: PODIATRY CLINIC | Facility: CLINIC | Age: 85
End: 2020-01-01

## 2020-01-01 ENCOUNTER — ANCILLARY PROCEDURE (OUTPATIENT)
Dept: CARDIOLOGY | Age: 85
End: 2020-01-01
Attending: INTERNAL MEDICINE

## 2020-01-01 ENCOUNTER — ANESTHESIA EVENT (OUTPATIENT)
Dept: SURGERY | Age: 85
DRG: 480 | End: 2020-01-01

## 2020-01-01 ENCOUNTER — APPOINTMENT (OUTPATIENT)
Dept: HEMATOLOGY/ONCOLOGY | Facility: HOSPITAL | Age: 85
End: 2020-01-01
Attending: INTERNAL MEDICINE
Payer: MEDICARE

## 2020-01-01 ENCOUNTER — APPOINTMENT (OUTPATIENT)
Dept: GENERAL RADIOLOGY | Facility: HOSPITAL | Age: 85
DRG: 871 | End: 2020-01-01
Attending: HOSPITALIST
Payer: MEDICARE

## 2020-01-01 VITALS
DIASTOLIC BLOOD PRESSURE: 54 MMHG | OXYGEN SATURATION: 98 % | RESPIRATION RATE: 16 BRPM | HEART RATE: 69 BPM | TEMPERATURE: 98 F | SYSTOLIC BLOOD PRESSURE: 142 MMHG | BODY MASS INDEX: 19 KG/M2 | HEIGHT: 64 IN

## 2020-01-01 VITALS
DIASTOLIC BLOOD PRESSURE: 44 MMHG | HEIGHT: 62 IN | HEART RATE: 56 BPM | RESPIRATION RATE: 18 BRPM | SYSTOLIC BLOOD PRESSURE: 128 MMHG | BODY MASS INDEX: 18.07 KG/M2 | WEIGHT: 98.19 LBS

## 2020-01-01 VITALS
WEIGHT: 113 LBS | DIASTOLIC BLOOD PRESSURE: 51 MMHG | HEART RATE: 60 BPM | HEIGHT: 64 IN | SYSTOLIC BLOOD PRESSURE: 97 MMHG | RESPIRATION RATE: 18 BRPM | BODY MASS INDEX: 19.29 KG/M2

## 2020-01-01 VITALS
DIASTOLIC BLOOD PRESSURE: 55 MMHG | BODY MASS INDEX: 21.9 KG/M2 | SYSTOLIC BLOOD PRESSURE: 136 MMHG | HEART RATE: 60 BPM | WEIGHT: 111.55 LBS | RESPIRATION RATE: 20 BRPM | TEMPERATURE: 97.3 F | HEIGHT: 60 IN | OXYGEN SATURATION: 98 %

## 2020-01-01 VITALS
HEART RATE: 69 BPM | RESPIRATION RATE: 16 BRPM | TEMPERATURE: 98 F | DIASTOLIC BLOOD PRESSURE: 60 MMHG | SYSTOLIC BLOOD PRESSURE: 157 MMHG | OXYGEN SATURATION: 97 %

## 2020-01-01 VITALS
WEIGHT: 110 LBS | BODY MASS INDEX: 19 KG/M2 | TEMPERATURE: 98 F | RESPIRATION RATE: 18 BRPM | SYSTOLIC BLOOD PRESSURE: 119 MMHG | DIASTOLIC BLOOD PRESSURE: 59 MMHG | HEART RATE: 60 BPM

## 2020-01-01 VITALS — HEART RATE: 60 BPM

## 2020-01-01 VITALS
TEMPERATURE: 97 F | DIASTOLIC BLOOD PRESSURE: 49 MMHG | HEIGHT: 66 IN | WEIGHT: 110.25 LBS | OXYGEN SATURATION: 73 % | BODY MASS INDEX: 17.72 KG/M2 | SYSTOLIC BLOOD PRESSURE: 119 MMHG

## 2020-01-01 VITALS
BODY MASS INDEX: 19 KG/M2 | HEART RATE: 73 BPM | RESPIRATION RATE: 14 BRPM | DIASTOLIC BLOOD PRESSURE: 53 MMHG | OXYGEN SATURATION: 98 % | WEIGHT: 109.81 LBS | TEMPERATURE: 99 F | SYSTOLIC BLOOD PRESSURE: 134 MMHG

## 2020-01-01 VITALS
WEIGHT: 106 LBS | BODY MASS INDEX: 19 KG/M2 | RESPIRATION RATE: 18 BRPM | SYSTOLIC BLOOD PRESSURE: 98 MMHG | DIASTOLIC BLOOD PRESSURE: 43 MMHG | HEART RATE: 61 BPM

## 2020-01-01 DIAGNOSIS — I48.91 ATRIAL FIBRILLATION AND FLUTTER (HCC): ICD-10-CM

## 2020-01-01 DIAGNOSIS — F41.9 ANXIETY: ICD-10-CM

## 2020-01-01 DIAGNOSIS — C91.10 CHRONIC LYMPHOID LEUKEMIA (HCC): ICD-10-CM

## 2020-01-01 DIAGNOSIS — I73.89 OTHER SPECIFIED PERIPHERAL VASCULAR DISEASES (HCC): Primary | ICD-10-CM

## 2020-01-01 DIAGNOSIS — Z79.4 TYPE 2 DIABETES MELLITUS WITH BOTH EYES AFFECTED BY MODERATE NONPROLIFERATIVE RETINOPATHY WITHOUT MACULAR EDEMA, WITH LONG-TERM CURRENT USE OF INSULIN (HCC): ICD-10-CM

## 2020-01-01 DIAGNOSIS — E11.9 TYPE 2 DIABETES MELLITUS WITHOUT COMPLICATION, WITH LONG-TERM CURRENT USE OF INSULIN (HCC): ICD-10-CM

## 2020-01-01 DIAGNOSIS — I48.92 ATRIAL FIBRILLATION AND FLUTTER (HCC): ICD-10-CM

## 2020-01-01 DIAGNOSIS — L97.523 SKIN ULCER OF TOE OF LEFT FOOT WITH NECROSIS OF MUSCLE (HCC): Primary | ICD-10-CM

## 2020-01-01 DIAGNOSIS — Z79.4 TYPE 2 DIABETES MELLITUS WITHOUT COMPLICATION, WITH LONG-TERM CURRENT USE OF INSULIN (HCC): Primary | ICD-10-CM

## 2020-01-01 DIAGNOSIS — I49.5 SSS (SICK SINUS SYNDROME) (HCC): ICD-10-CM

## 2020-01-01 DIAGNOSIS — Z95.0 PACEMAKER: Primary | ICD-10-CM

## 2020-01-01 DIAGNOSIS — I95.9 HYPOTENSION, UNSPECIFIED HYPOTENSION TYPE: Primary | ICD-10-CM

## 2020-01-01 DIAGNOSIS — E11.39 TYPE 2 DIABETES MELLITUS WITH OTHER DIABETIC OPHTHALMIC COMPLICATION (HCC): ICD-10-CM

## 2020-01-01 DIAGNOSIS — N30.00 ACUTE CYSTITIS WITHOUT HEMATURIA: ICD-10-CM

## 2020-01-01 DIAGNOSIS — E03.8 OTHER SPECIFIED HYPOTHYROIDISM: ICD-10-CM

## 2020-01-01 DIAGNOSIS — L97.529 ULCER OF TOE OF LEFT FOOT, UNSPECIFIED ULCER STAGE (HCC): ICD-10-CM

## 2020-01-01 DIAGNOSIS — F05 SUNDOWNING: ICD-10-CM

## 2020-01-01 DIAGNOSIS — E11.3393 TYPE 2 DIABETES MELLITUS WITH BOTH EYES AFFECTED BY MODERATE NONPROLIFERATIVE RETINOPATHY WITHOUT MACULAR EDEMA, WITH LONG-TERM CURRENT USE OF INSULIN (HCC): ICD-10-CM

## 2020-01-01 DIAGNOSIS — R41.0 DISORIENTATION: ICD-10-CM

## 2020-01-01 DIAGNOSIS — C91.10 CLL (CHRONIC LYMPHOCYTIC LEUKEMIA) (HCC): ICD-10-CM

## 2020-01-01 DIAGNOSIS — S60.00XA CONTUSION OF FINGER OF LEFT HAND, UNSPECIFIED FINGER, INITIAL ENCOUNTER: ICD-10-CM

## 2020-01-01 DIAGNOSIS — M86.672 OTHER CHRONIC OSTEOMYELITIS OF LEFT FOOT (HCC): ICD-10-CM

## 2020-01-01 DIAGNOSIS — I34.0 NONRHEUMATIC MITRAL VALVE REGURGITATION: ICD-10-CM

## 2020-01-01 DIAGNOSIS — I10 ESSENTIAL HYPERTENSION WITH GOAL BLOOD PRESSURE LESS THAN 140/90: ICD-10-CM

## 2020-01-01 DIAGNOSIS — Z79.4 TYPE 2 DIABETES MELLITUS WITHOUT COMPLICATION, WITH LONG-TERM CURRENT USE OF INSULIN (HCC): ICD-10-CM

## 2020-01-01 DIAGNOSIS — R53.83 OTHER FATIGUE: Primary | ICD-10-CM

## 2020-01-01 DIAGNOSIS — I50.31 ACUTE DIASTOLIC CONGESTIVE HEART FAILURE (HCC): ICD-10-CM
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
## 2020-01-01 DIAGNOSIS — R63.4 WEIGHT LOSS: ICD-10-CM

## 2020-01-01 DIAGNOSIS — S72.141A CLOSED DISPLACED INTERTROCHANTERIC FRACTURE OF RIGHT FEMUR, INITIAL ENCOUNTER (CMD): Primary | ICD-10-CM

## 2020-01-01 DIAGNOSIS — E55.9 VITAMIN D DEFICIENCY: ICD-10-CM

## 2020-01-01 DIAGNOSIS — I10 ESSENTIAL HYPERTENSION: ICD-10-CM

## 2020-01-01 DIAGNOSIS — N39.0 URINARY TRACT INFECTION WITHOUT HEMATURIA, SITE UNSPECIFIED: ICD-10-CM

## 2020-01-01 DIAGNOSIS — I73.9 PAD (PERIPHERAL ARTERY DISEASE) (HCC): ICD-10-CM

## 2020-01-01 DIAGNOSIS — L03.116 CELLULITIS OF LEFT LOWER EXTREMITY: ICD-10-CM

## 2020-01-01 DIAGNOSIS — Z95.0 S/P PLACEMENT OF CARDIAC PACEMAKER: ICD-10-CM

## 2020-01-01 DIAGNOSIS — S01.01XA LACERATION OF SCALP, INITIAL ENCOUNTER: Primary | ICD-10-CM

## 2020-01-01 DIAGNOSIS — F01.50 VASCULAR DEMENTIA WITHOUT BEHAVIORAL DISTURBANCE (HCC): ICD-10-CM

## 2020-01-01 DIAGNOSIS — E11.9 TYPE 2 DIABETES MELLITUS WITHOUT COMPLICATION, WITH LONG-TERM CURRENT USE OF INSULIN (HCC): Primary | ICD-10-CM

## 2020-01-01 DIAGNOSIS — D72.829 LEUKOCYTOSIS, UNSPECIFIED TYPE: ICD-10-CM

## 2020-01-01 DIAGNOSIS — S09.8XXA BLUNT HEAD INJURY, INITIAL ENCOUNTER: Primary | ICD-10-CM

## 2020-01-01 DIAGNOSIS — R41.82 ALTERED MENTAL STATUS, UNSPECIFIED ALTERED MENTAL STATUS TYPE: Primary | ICD-10-CM

## 2020-01-01 DIAGNOSIS — E78.49 OTHER HYPERLIPIDEMIA: ICD-10-CM

## 2020-01-01 DIAGNOSIS — Z79.4 TYPE 2 DIABETES MELLITUS WITH DIABETIC POLYNEUROPATHY, WITH LONG-TERM CURRENT USE OF INSULIN (HCC): ICD-10-CM

## 2020-01-01 DIAGNOSIS — E56.9 VITAMIN DEFICIENCY: ICD-10-CM

## 2020-01-01 DIAGNOSIS — D50.8 OTHER IRON DEFICIENCY ANEMIA: ICD-10-CM

## 2020-01-01 DIAGNOSIS — Z45.018 PACEMAKER REPROGRAMMING/CHECK: ICD-10-CM

## 2020-01-01 DIAGNOSIS — R41.0 CONFUSION: ICD-10-CM

## 2020-01-01 DIAGNOSIS — E11.42 TYPE 2 DIABETES MELLITUS WITH DIABETIC POLYNEUROPATHY, WITH LONG-TERM CURRENT USE OF INSULIN (HCC): ICD-10-CM

## 2020-01-01 DIAGNOSIS — I73.89 OTHER SPECIFIED PERIPHERAL VASCULAR DISEASES (HCC): ICD-10-CM
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
## 2020-01-01 DIAGNOSIS — R53.1 WEAKNESS: ICD-10-CM

## 2020-01-01 LAB
25(OH)D3 SERPL-MCNC: 15 NG/ML (ref 30–100)
ABO + RH BLD: NORMAL
ALBUMIN SERPL-MCNC: 3.5 G/DL (ref 3.4–5)
ALBUMIN SMEAR (DALB): ABNORMAL
ALBUMIN/GLOB SERPL: 1.1 {RATIO} (ref 1–2)
ALP LIVER SERPL-CCNC: 97 U/L (ref 55–142)
ALT SERPL-CCNC: 20 U/L (ref 13–56)
AMPHET UR QL SCN: NEGATIVE
ANION GAP SERPL CALC-SCNC: 10 MMOL/L (ref 10–20)
ANION GAP SERPL CALC-SCNC: 10 MMOL/L (ref 10–20)
ANION GAP SERPL CALC-SCNC: 11 MMOL/L (ref 0–18)
ANION GAP SERPL CALC-SCNC: 11 MMOL/L (ref 10–20)
ANION GAP SERPL CALC-SCNC: 12 MMOL/L (ref 10–20)
ANION GAP SERPL CALC-SCNC: 4 MMOL/L (ref 0–18)
ANION GAP SERPL CALC-SCNC: 4 MMOL/L (ref 0–18)
APPEARANCE UR: ABNORMAL
APTT PPP: 26 SEC (ref 22–32)
APTT PPP: 27 SEC (ref 22–32)
APTT PPP: 32 SEC (ref 22–32)
APTT PPP: 37 SEC (ref 22–32)
AST SERPL-CCNC: 23 U/L (ref 15–37)
ATRIAL RATE (BPM): 55
BACTERIA #/AREA URNS HPF: ABNORMAL /HPF
BACTERIA UR CULT: ABNORMAL
BARBITURATES UR QL SCN: NEGATIVE
BASOPHILS # BLD AUTO: 0.07 X10(3) UL (ref 0–0.2)
BASOPHILS # BLD AUTO: 0.08 X10(3) UL (ref 0–0.2)
BASOPHILS # BLD AUTO: 0.09 X10(3) UL (ref 0–0.2)
BASOPHILS # BLD AUTO: 0.09 X10(3) UL (ref 0–0.2)
BASOPHILS # BLD: 0 K/MCL (ref 0–0.3)
BASOPHILS # BLD: 0.1 K/MCL (ref 0–0.3)
BASOPHILS NFR BLD AUTO: 0.4 %
BASOPHILS NFR BLD AUTO: 0.6 %
BASOPHILS NFR BLD: 0 %
BENZODIAZ UR QL SCN: NEGATIVE
BILIRUB SERPL-MCNC: 0.3 MG/DL (ref 0.1–2)
BILIRUB UR QL STRIP: NEGATIVE
BILIRUB UR QL: NEGATIVE
BILIRUB UR QL: NEGATIVE
BLD GP AB SCN SERPL QL GEL: NEGATIVE
BLD PROD TYP BPU: NORMAL
BLD UNIT ID BPU: NORMAL
BLOOD BANK CMNT PATIENT-IMP: NORMAL
BUN BLD-MCNC: 22 MG/DL (ref 7–18)
BUN BLD-MCNC: 36 MG/DL (ref 7–18)
BUN BLD-MCNC: 44 MG/DL (ref 7–18)
BUN SERPL-MCNC: 27 MG/DL (ref 6–20)
BUN SERPL-MCNC: 33 MG/DL (ref 6–20)
BUN SERPL-MCNC: 39 MG/DL (ref 6–20)
BUN SERPL-MCNC: 41 MG/DL (ref 6–20)
BUN/CREAT SERPL: 16.7 (ref 10–20)
BUN/CREAT SERPL: 21.5 (ref 10–20)
BUN/CREAT SERPL: 22.9 (ref 10–20)
BUN/CREAT SERPL: 25 (ref 7–25)
BUN/CREAT SERPL: 26 (ref 7–25)
BUN/CREAT SERPL: 30 (ref 7–25)
BUN/CREAT SERPL: 30 (ref 7–25)
CALCIUM BLD-MCNC: 8.8 MG/DL (ref 8.5–10.1)
CALCIUM BLD-MCNC: 9.3 MG/DL (ref 8.5–10.1)
CALCIUM BLD-MCNC: 9.3 MG/DL (ref 8.5–10.1)
CALCIUM SERPL-MCNC: 8.4 MG/DL (ref 8.4–10.2)
CALCIUM SERPL-MCNC: 8.8 MG/DL (ref 8.4–10.2)
CALCIUM SERPL-MCNC: 9.1 MG/DL (ref 8.4–10.2)
CALCIUM SERPL-MCNC: 9.4 MG/DL (ref 8.4–10.2)
CANNABINOIDS UR QL SCN: NEGATIVE
CHLORIDE SERPL-SCNC: 104 MMOL/L (ref 98–112)
CHLORIDE SERPL-SCNC: 107 MMOL/L (ref 98–107)
CHLORIDE SERPL-SCNC: 107 MMOL/L (ref 98–107)
CHLORIDE SERPL-SCNC: 107 MMOL/L (ref 98–112)
CHLORIDE SERPL-SCNC: 110 MMOL/L (ref 98–112)
CHLORIDE SERPL-SCNC: 111 MMOL/L (ref 98–107)
CHLORIDE SERPL-SCNC: 114 MMOL/L (ref 98–107)
CLARITY UR: CLEAR
CO2 SERPL-SCNC: 22 MMOL/L (ref 21–32)
CO2 SERPL-SCNC: 23 MMOL/L (ref 21–32)
CO2 SERPL-SCNC: 25 MMOL/L (ref 21–32)
CO2 SERPL-SCNC: 25 MMOL/L (ref 21–32)
CO2 SERPL-SCNC: 28 MMOL/L (ref 21–32)
COCAINE UR QL: NEGATIVE
COLOR UR: ABNORMAL
COLOR UR: YELLOW
COLOR UR: YELLOW
CREAT BLD-MCNC: 1.32 MG/DL (ref 0.55–1.02)
CREAT BLD-MCNC: 1.57 MG/DL (ref 0.55–1.02)
CREAT BLD-MCNC: 2.05 MG/DL (ref 0.55–1.02)
CREAT SERPL-MCNC: 1.08 MG/DL (ref 0.51–0.95)
CREAT SERPL-MCNC: 1.1 MG/DL (ref 0.51–0.95)
CREAT SERPL-MCNC: 1.32 MG/DL (ref 0.51–0.95)
CREAT SERPL-MCNC: 1.55 MG/DL (ref 0.51–0.95)
CROSSMATCH EXPIRE: NORMAL
DEPRECATED HBV CORE AB SER IA-ACNC: 220 NG/ML (ref 18–340)
DEPRECATED RDW RBC AUTO: 49.8 FL (ref 35.1–46.3)
DEPRECATED RDW RBC AUTO: 50.7 FL (ref 35.1–46.3)
DEPRECATED RDW RBC AUTO: 51.3 FL (ref 35.1–46.3)
DEPRECATED RDW RBC AUTO: 51.7 FL (ref 35.1–46.3)
DIFFERENTIAL METHOD BLD: ABNORMAL
EOSINOPHIL # BLD AUTO: 0.21 X10(3) UL (ref 0–0.7)
EOSINOPHIL # BLD AUTO: 0.23 X10(3) UL (ref 0–0.7)
EOSINOPHIL # BLD AUTO: 0.27 X10(3) UL (ref 0–0.7)
EOSINOPHIL # BLD AUTO: 0.29 X10(3) UL (ref 0–0.7)
EOSINOPHIL # BLD: 0 K/MCL (ref 0.1–0.5)
EOSINOPHIL # BLD: 0 K/MCL (ref 0.1–0.5)
EOSINOPHIL # BLD: 0.2 K/MCL (ref 0.1–0.5)
EOSINOPHIL # BLD: 0.2 K/MCL (ref 0.1–0.5)
EOSINOPHIL NFR BLD AUTO: 1.3 %
EOSINOPHIL NFR BLD AUTO: 1.6 %
EOSINOPHIL NFR BLD AUTO: 1.6 %
EOSINOPHIL NFR BLD AUTO: 2.2 %
EOSINOPHIL NFR BLD: 0 %
EOSINOPHIL NFR BLD: 0 %
EOSINOPHIL NFR BLD: 1 %
EOSINOPHIL NFR BLD: 1 %
ERYTHROCYTE [DISTWIDTH] IN BLOOD BY AUTOMATED COUNT: 15.1 % (ref 11–15)
ERYTHROCYTE [DISTWIDTH] IN BLOOD BY AUTOMATED COUNT: 15.2 % (ref 11–15)
ERYTHROCYTE [DISTWIDTH] IN BLOOD BY AUTOMATED COUNT: 15.9 % (ref 11–15)
ERYTHROCYTE [DISTWIDTH] IN BLOOD BY AUTOMATED COUNT: 16 % (ref 11–15)
ERYTHROCYTE [DISTWIDTH] IN BLOOD: 16.1 % (ref 11–15)
ERYTHROCYTE [DISTWIDTH] IN BLOOD: 16.1 % (ref 11–15)
ERYTHROCYTE [DISTWIDTH] IN BLOOD: 16.6 % (ref 11–15)
ERYTHROCYTE [DISTWIDTH] IN BLOOD: 16.6 % (ref 11–15)
ERYTHROCYTE [DISTWIDTH] IN BLOOD: 16.8 % (ref 11–15)
EST. AVERAGE GLUCOSE BLD GHB EST-MCNC: 186 MG/DL (ref 68–126)
ETHANOL SERPL-MCNC: <3 MG/DL (ref ?–3)
GLOBULIN PLAS-MCNC: 3.3 G/DL (ref 2.8–4.4)
GLUCOSE BLD-MCNC: 213 MG/DL (ref 70–99)
GLUCOSE BLD-MCNC: 257 MG/DL (ref 70–99)
GLUCOSE BLD-MCNC: 295 MG/DL (ref 70–99)
GLUCOSE BLDC GLUCOMTR-MCNC: 120 MG/DL (ref 70–99)
GLUCOSE BLDC GLUCOMTR-MCNC: 120 MG/DL (ref 70–99)
GLUCOSE BLDC GLUCOMTR-MCNC: 128 MG/DL (ref 70–99)
GLUCOSE BLDC GLUCOMTR-MCNC: 138 MG/DL (ref 70–99)
GLUCOSE BLDC GLUCOMTR-MCNC: 143 MG/DL (ref 70–99)
GLUCOSE BLDC GLUCOMTR-MCNC: 148 MG/DL (ref 70–99)
GLUCOSE BLDC GLUCOMTR-MCNC: 149 MG/DL (ref 70–99)
GLUCOSE BLDC GLUCOMTR-MCNC: 152 MG/DL (ref 70–99)
GLUCOSE BLDC GLUCOMTR-MCNC: 169 MG/DL (ref 70–99)
GLUCOSE BLDC GLUCOMTR-MCNC: 171 MG/DL (ref 70–99)
GLUCOSE BLDC GLUCOMTR-MCNC: 176 MG/DL (ref 70–99)
GLUCOSE BLDC GLUCOMTR-MCNC: 180 MG/DL (ref 70–99)
GLUCOSE BLDC GLUCOMTR-MCNC: 188 MG/DL (ref 70–99)
GLUCOSE BLDC GLUCOMTR-MCNC: 206 MG/DL (ref 70–99)
GLUCOSE BLDC GLUCOMTR-MCNC: 222 MG/DL (ref 70–99)
GLUCOSE BLDC GLUCOMTR-MCNC: 226 MG/DL (ref 70–99)
GLUCOSE BLDC GLUCOMTR-MCNC: 230 MG/DL (ref 70–99)
GLUCOSE BLDC GLUCOMTR-MCNC: 231 MG/DL (ref 70–99)
GLUCOSE BLDC GLUCOMTR-MCNC: 236 MG/DL (ref 70–99)
GLUCOSE BLDC GLUCOMTR-MCNC: 240 MG/DL (ref 70–99)
GLUCOSE BLDC GLUCOMTR-MCNC: 242 MG/DL (ref 70–99)
GLUCOSE BLDC GLUCOMTR-MCNC: 246 MG/DL (ref 70–99)
GLUCOSE BLDC GLUCOMTR-MCNC: 272 MG/DL (ref 70–99)
GLUCOSE BLDC GLUCOMTR-MCNC: 319 MG/DL (ref 70–99)
GLUCOSE SERPL-MCNC: 129 MG/DL (ref 65–99)
GLUCOSE SERPL-MCNC: 242 MG/DL (ref 65–99)
GLUCOSE SERPL-MCNC: 254 MG/DL (ref 65–99)
GLUCOSE SERPL-MCNC: 261 MG/DL (ref 65–99)
GLUCOSE UR STRIP-MCNC: >500 MG/DL
GLUCOSE UR-MCNC: >=500 MG/DL
GLUCOSE UR-MCNC: NEGATIVE MG/DL
HBA1C MFR BLD HPLC: 8.1 % (ref ?–5.7)
HCT VFR BLD AUTO: 30.8 % (ref 35–48)
HCT VFR BLD AUTO: 35.1 % (ref 35–48)
HCT VFR BLD AUTO: 37.7 % (ref 35–48)
HCT VFR BLD AUTO: 38 % (ref 35–48)
HCT VFR BLD CALC: 22.3 % (ref 36–46.5)
HCT VFR BLD CALC: 23.6 % (ref 36–46.5)
HCT VFR BLD CALC: 28.6 % (ref 36–46.5)
HCT VFR BLD CALC: 31.6 % (ref 36–46.5)
HCT VFR BLD CALC: 34 % (ref 36–46.5)
HCT VFR BLD CALC: 39.5 % (ref 36–46.5)
HGB BLD-MCNC: 10.6 G/DL (ref 12–15.5)
HGB BLD-MCNC: 10.8 G/DL (ref 12–16)
HGB BLD-MCNC: 11.7 G/DL (ref 12–16)
HGB BLD-MCNC: 12.2 G/DL (ref 12–16)
HGB BLD-MCNC: 12.5 G/DL (ref 12–15.5)
HGB BLD-MCNC: 6.9 G/DL (ref 12–15.5)
HGB BLD-MCNC: 7.5 G/DL (ref 12–15.5)
HGB BLD-MCNC: 9.2 G/DL (ref 12–15.5)
HGB BLD-MCNC: 9.5 G/DL (ref 12–16)
HGB BLD-MCNC: 9.6 G/DL (ref 12–15.5)
HGB UR QL STRIP.AUTO: NEGATIVE
HGB UR QL STRIP.AUTO: NEGATIVE
HGB UR QL STRIP: ABNORMAL
HYALINE CASTS #/AREA URNS LPF: ABNORMAL /LPF (ref 0–5)
IMM GRANULOCYTES # BLD AUTO: 0.03 X10(3) UL (ref 0–1)
IMM GRANULOCYTES # BLD AUTO: 0.03 X10(3) UL (ref 0–1)
IMM GRANULOCYTES # BLD AUTO: 0.04 X10(3) UL (ref 0–1)
IMM GRANULOCYTES # BLD AUTO: 0.05 X10(3) UL (ref 0–1)
IMM GRANULOCYTES # BLD AUTO: 0.1 K/MCL (ref 0–0.2)
IMM GRANULOCYTES NFR BLD: 0 %
IMM GRANULOCYTES NFR BLD: 0.2 %
IMM GRANULOCYTES NFR BLD: 0.3 %
INR PPP: 1
IRON SATURATION: 16 % (ref 15–50)
IRON SERPL-MCNC: 54 UG/DL (ref 50–170)
KETONES UR STRIP-MCNC: ABNORMAL MG/DL
KETONES UR-MCNC: NEGATIVE MG/DL
KETONES UR-MCNC: NEGATIVE MG/DL
LEUKOCYTE ESTERASE UR QL STRIP.AUTO: NEGATIVE
LEUKOCYTE ESTERASE UR QL STRIP: ABNORMAL
LYMPHOCYTES # BLD AUTO: 6.06 X10(3) UL (ref 1–4)
LYMPHOCYTES # BLD AUTO: 6.28 X10(3) UL (ref 1–4)
LYMPHOCYTES # BLD AUTO: 6.75 X10(3) UL (ref 1–4)
LYMPHOCYTES # BLD AUTO: 8.22 X10(3) UL (ref 1–4)
LYMPHOCYTES # BLD: 11.5 K/MCL (ref 1–4)
LYMPHOCYTES # BLD: 3.4 K/MCL (ref 1–4)
LYMPHOCYTES # BLD: 4.5 K/MCL (ref 1–4)
LYMPHOCYTES # BLD: 5.1 K/MCL (ref 1–4)
LYMPHOCYTES NFR BLD AUTO: 42.3 %
LYMPHOCYTES NFR BLD AUTO: 42.4 %
LYMPHOCYTES NFR BLD AUTO: 45.1 %
LYMPHOCYTES NFR BLD AUTO: 50.8 %
LYMPHOCYTES NFR BLD: 26 %
LYMPHOCYTES NFR BLD: 28 %
LYMPHOCYTES NFR BLD: 32 %
LYMPHOCYTES NFR BLD: 52 %
M PROTEIN MFR SERPL ELPH: 6.8 G/DL (ref 6.4–8.2)
MAJ XM SERPL-IMP: NORMAL
MCH RBC QN AUTO: 26.6 PG (ref 26–34)
MCH RBC QN AUTO: 26.9 PG (ref 26–34)
MCH RBC QN AUTO: 27.1 PG (ref 26–34)
MCH RBC QN AUTO: 27.3 PG (ref 26–34)
MCH RBC QN AUTO: 27.5 PG (ref 26–34)
MCH RBC QN AUTO: 27.6 PG (ref 26–34)
MCH RBC QN AUTO: 28.1 PG (ref 26–34)
MCH RBC QN AUTO: 28.2 PG (ref 26–34)
MCH RBC QN AUTO: 28.2 PG (ref 26–34)
MCHC RBC AUTO-ENTMCNC: 30.4 G/DL (ref 32–36.5)
MCHC RBC AUTO-ENTMCNC: 30.8 G/DL (ref 31–37)
MCHC RBC AUTO-ENTMCNC: 30.8 G/DL (ref 31–37)
MCHC RBC AUTO-ENTMCNC: 30.9 G/DL (ref 32–36.5)
MCHC RBC AUTO-ENTMCNC: 31 G/DL (ref 31–37)
MCHC RBC AUTO-ENTMCNC: 31.2 G/DL (ref 32–36.5)
MCHC RBC AUTO-ENTMCNC: 31.6 G/DL (ref 32–36.5)
MCHC RBC AUTO-ENTMCNC: 31.8 G/DL (ref 32–36.5)
MCHC RBC AUTO-ENTMCNC: 32.1 G/DL (ref 31–37)
MCV RBC AUTO: 86.3 FL (ref 78–100)
MCV RBC AUTO: 87 FL (ref 78–100)
MCV RBC AUTO: 87.5 FL (ref 78–100)
MCV RBC AUTO: 87.5 FL (ref 78–100)
MCV RBC AUTO: 87.8 FL (ref 80–100)
MCV RBC AUTO: 87.9 FL (ref 80–100)
MCV RBC AUTO: 88.7 FL (ref 78–100)
MCV RBC AUTO: 89.5 FL (ref 80–100)
MCV RBC AUTO: 91.4 FL (ref 80–100)
MDMA UR QL SCN: NEGATIVE
METHADONE UR QL SCN: NEGATIVE
MONOCYTES # BLD AUTO: 0.79 X10(3) UL (ref 0.1–1)
MONOCYTES # BLD AUTO: 0.89 X10(3) UL (ref 0.1–1)
MONOCYTES # BLD AUTO: 1.13 X10(3) UL (ref 0.1–1)
MONOCYTES # BLD AUTO: 1.34 X10(3) UL (ref 0.1–1)
MONOCYTES # BLD: 1.2 K/MCL (ref 0.3–0.9)
MONOCYTES # BLD: 1.5 K/MCL (ref 0.3–0.9)
MONOCYTES # BLD: 1.5 K/MCL (ref 0.3–0.9)
MONOCYTES # BLD: 1.7 K/MCL (ref 0.3–0.9)
MONOCYTES NFR BLD AUTO: 4.9 %
MONOCYTES NFR BLD AUTO: 6.4 %
MONOCYTES NFR BLD AUTO: 7.1 %
MONOCYTES NFR BLD AUTO: 9.3 %
MONOCYTES NFR BLD: 10 %
MONOCYTES NFR BLD: 13 %
MONOCYTES NFR BLD: 7 %
MONOCYTES NFR BLD: 7 %
MRSA DNA SPEC QL NAA+PROBE: NOT DETECTED
MUCOUS THREADS URNS QL MICRO: PRESENT
NEUTROPHILS # BLD AUTO: 4.92 X10 (3) UL (ref 1.5–7.7)
NEUTROPHILS # BLD AUTO: 4.92 X10(3) UL (ref 1.5–7.7)
NEUTROPHILS # BLD AUTO: 6.59 X10 (3) UL (ref 1.5–7.7)
NEUTROPHILS # BLD AUTO: 6.59 X10(3) UL (ref 1.5–7.7)
NEUTROPHILS # BLD AUTO: 7.68 X10 (3) UL (ref 1.5–7.7)
NEUTROPHILS # BLD AUTO: 7.68 X10(3) UL (ref 1.5–7.7)
NEUTROPHILS # BLD AUTO: 8.72 X10 (3) UL (ref 1.5–7.7)
NEUTROPHILS # BLD AUTO: 8.72 X10(3) UL (ref 1.5–7.7)
NEUTROPHILS # BLD: 10.2 K/MCL (ref 1.8–7.7)
NEUTROPHILS # BLD: 8.2 K/MCL (ref 1.8–7.7)
NEUTROPHILS # BLD: 8.8 K/MCL (ref 1.8–7.7)
NEUTROPHILS # BLD: 8.8 K/MCL (ref 1.8–7.7)
NEUTROPHILS NFR BLD AUTO: 39.8 %
NEUTROPHILS NFR BLD AUTO: 46 %
NEUTROPHILS NFR BLD AUTO: 47.8 %
NEUTROPHILS NFR BLD AUTO: 48.3 %
NEUTROPHILS NFR BLD: 57 %
NEUTROPHILS NFR BLD: 61 %
NEUTROPHILS NFR BLD: 65 %
NEUTS SEG NFR BLD: 40 %
NITRITE UR QL STRIP.AUTO: NEGATIVE
NITRITE UR QL STRIP.AUTO: NEGATIVE
NITRITE UR QL STRIP: NEGATIVE
NRBC BLD MANUAL-RTO: 0 /100 WBC
NT-PROBNP SERPL-MCNC: 3120 PG/ML (ref ?–450)
NUM BPU REQUESTED: 1
OPIATES UR QL SCN: NEGATIVE
ORGANISM: ABNORMAL
OSMOLALITY SERPL CALC.SUM OF ELEC: 300 MOSM/KG (ref 275–295)
OSMOLALITY SERPL CALC.SUM OF ELEC: 303 MOSM/KG (ref 275–295)
OSMOLALITY SERPL CALC.SUM OF ELEC: 306 MOSM/KG (ref 275–295)
OXYCODONE UR QL SCN: NEGATIVE
PATH REV BLD -IMP: NORMAL
PATHOLOGIST NAME: NORMAL
PATIENT FASTING Y/N/NP: NO
PCP UR QL SCN: NEGATIVE
PH UR STRIP: 5 UNITS (ref 5–7)
PH UR: 5 [PH] (ref 5–8)
PH UR: 5 [PH] (ref 5–8)
PLAT MORPH BLD: NORMAL
PLATELET # BLD AUTO: 198 10(3)UL (ref 150–450)
PLATELET # BLD AUTO: 252 10(3)UL (ref 150–450)
PLATELET # BLD AUTO: 307 10(3)UL (ref 150–450)
PLATELET # BLD AUTO: 367 10(3)UL (ref 150–450)
PLATELET # BLD: 134 K/MCL (ref 140–450)
PLATELET # BLD: 175 K/MCL (ref 140–450)
PLATELET # BLD: 178 K/MCL (ref 140–450)
PLATELET # BLD: 196 K/MCL (ref 140–450)
PLATELET # BLD: 233 K/MCL (ref 140–450)
POTASSIUM SERPL-SCNC: 4.2 MMOL/L (ref 3.4–5.1)
POTASSIUM SERPL-SCNC: 4.4 MMOL/L (ref 3.5–5.1)
POTASSIUM SERPL-SCNC: 4.7 MMOL/L (ref 3.4–5.1)
POTASSIUM SERPL-SCNC: 4.9 MMOL/L (ref 3.4–5.1)
POTASSIUM SERPL-SCNC: 4.9 MMOL/L (ref 3.5–5.1)
POTASSIUM SERPL-SCNC: 5 MMOL/L (ref 3.5–5.1)
POTASSIUM SERPL-SCNC: 5.2 MMOL/L (ref 3.4–5.1)
PROT UR STRIP-MCNC: 30 MG/DL
PROT UR-MCNC: NEGATIVE MG/DL
PROTHROMBIN TIME: 10.6 SEC (ref 9.7–11.8)
QRS-INTERVAL (MSEC): 84
QT-INTERVAL (MSEC): 458
QTC: 458
R AXIS (DEGREES): 18
RBC # BLD AUTO: 3.44 X10(6)UL (ref 3.8–5.3)
RBC # BLD AUTO: 3.84 X10(6)UL (ref 3.8–5.3)
RBC # BLD AUTO: 4.29 X10(6)UL (ref 3.8–5.3)
RBC # BLD AUTO: 4.33 X10(6)UL (ref 3.8–5.3)
RBC # BLD: 2.55 MIL/MCL (ref 4–5.2)
RBC # BLD: 2.66 MIL/MCL (ref 4–5.2)
RBC # BLD: 3.61 MIL/MCL (ref 4–5.2)
RBC # BLD: 3.94 MIL/MCL (ref 4–5.2)
RBC # BLD: 4.54 MIL/MCL (ref 4–5.2)
RBC #/AREA URNS AUTO: 1 /HPF
RBC #/AREA URNS HPF: ABNORMAL /HPF (ref 0–2)
RBC MORPH BLD: NORMAL
REPORT STATUS (RPT): ABNORMAL
REPORT TEXT: NORMAL
SMUDGE CELLS BLD QL SMEAR: PRESENT
SODIUM SERPL-SCNC: 137 MMOL/L (ref 135–145)
SODIUM SERPL-SCNC: 137 MMOL/L (ref 136–145)
SODIUM SERPL-SCNC: 138 MMOL/L (ref 135–145)
SODIUM SERPL-SCNC: 138 MMOL/L (ref 135–145)
SODIUM SERPL-SCNC: 139 MMOL/L (ref 136–145)
SODIUM SERPL-SCNC: 139 MMOL/L (ref 136–145)
SODIUM SERPL-SCNC: 142 MMOL/L (ref 135–145)
SP GR UR STRIP: 1.01 (ref 1–1.03)
SP GR UR STRIP: 1.01 (ref 1–1.03)
SP GR UR STRIP: 1.02 (ref 1–1.03)
SPECIMEN SOURCE: ABNORMAL
SPECIMEN SOURCE: ABNORMAL
SPECIMEN SOURCE: NORMAL
SQUAMOUS #/AREA URNS HPF: ABNORMAL /HPF (ref 0–5)
STATUS OF UNIT: NORMAL
T AXIS (DEGREES): 69
T4 FREE SERPL-MCNC: 1.2 NG/DL (ref 0.8–1.7)
TOTAL IRON BINDING CAPACITY: 329 UG/DL (ref 240–450)
TRANSFERRIN SERPL-MCNC: 221 MG/DL (ref 200–360)
TRANSFUSION STATUS: NORMAL
TSH SERPL-ACNC: 2.32 M[IU]/L
TSI SER-ACNC: 2.32 MIU/ML (ref 0.36–3.74)
TSI SER-ACNC: 8.3 MIU/ML (ref 0.36–3.74)
UNIT DIVISION: 0
UROBILINOGEN UR STRIP-ACNC: <2
UROBILINOGEN UR STRIP-ACNC: <2
UROBILINOGEN UR STRIP-MCNC: 0.2 MG/DL (ref 0–1)
VENTRICULAR RATE EKG/MIN (BPM): 60
VIT B12 SERPL-MCNC: 469 PG/ML (ref 193–986)
WBC # BLD AUTO: 12.4 X10(3) UL (ref 4–11)
WBC # BLD AUTO: 14.3 X10(3) UL (ref 4–11)
WBC # BLD AUTO: 15.9 X10(3) UL (ref 4–11)
WBC # BLD AUTO: 18.2 X10(3) UL (ref 4–11)
WBC # BLD: 13.5 K/MCL (ref 4.2–11)
WBC # BLD: 15.6 K/MCL (ref 4.2–11)
WBC # BLD: 16 K/MCL (ref 4.2–11)
WBC # BLD: 20 K/MCL (ref 4.2–11)
WBC # BLD: 22.1 K/MCL (ref 4.2–11)
WBC #/AREA URNS AUTO: 6 /HPF
WBC #/AREA URNS HPF: >100 /HPF (ref 0–5)
WBC MORPH BLD: ABNORMAL

## 2020-01-01 PROCEDURE — 99214 OFFICE O/P EST MOD 30 MIN: CPT | Performed by: NURSE PRACTITIONER

## 2020-01-01 PROCEDURE — 84443 ASSAY THYROID STIM HORMONE: CPT

## 2020-01-01 PROCEDURE — 85027 COMPLETE CBC AUTOMATED: CPT

## 2020-01-01 PROCEDURE — 80048 BASIC METABOLIC PNL TOTAL CA: CPT

## 2020-01-01 PROCEDURE — 71045 X-RAY EXAM CHEST 1 VIEW: CPT

## 2020-01-01 PROCEDURE — 85610 PROTHROMBIN TIME: CPT

## 2020-01-01 PROCEDURE — 10000002 HB ROOM CHARGE MED SURG

## 2020-01-01 PROCEDURE — 99309 SBSQ NF CARE MODERATE MDM 30: CPT | Performed by: INTERNAL MEDICINE

## 2020-01-01 PROCEDURE — 10002800 HB RX 250 W HCPCS: Performed by: PHYSICIAN ASSISTANT

## 2020-01-01 PROCEDURE — 36415 COLL VENOUS BLD VENIPUNCTURE: CPT

## 2020-01-01 PROCEDURE — 10002803 HB RX 637: Performed by: FAMILY MEDICINE

## 2020-01-01 PROCEDURE — 99285 EMERGENCY DEPT VISIT HI MDM: CPT

## 2020-01-01 PROCEDURE — 13000010 HB ANESTHESIA SPINAL EA ADD MINUTE: Performed by: ORTHOPAEDIC SURGERY

## 2020-01-01 PROCEDURE — 87186 SC STD MICRODIL/AGAR DIL: CPT

## 2020-01-01 PROCEDURE — 99306 1ST NF CARE HIGH MDM 50: CPT | Performed by: INTERNAL MEDICINE

## 2020-01-01 PROCEDURE — 85060 BLOOD SMEAR INTERPRETATION: CPT | Performed by: EMERGENCY MEDICINE

## 2020-01-01 PROCEDURE — 85025 COMPLETE CBC W/AUTO DIFF WBC: CPT

## 2020-01-01 PROCEDURE — 10006023 HB SUPPLY 272: Performed by: ORTHOPAEDIC SURGERY

## 2020-01-01 PROCEDURE — 13000114 HB ORTHO BASIC CASE S/U + 1ST 15 MIN: Performed by: ORTHOPAEDIC SURGERY

## 2020-01-01 PROCEDURE — 10002800 HB RX 250 W HCPCS: Performed by: INTERNAL MEDICINE

## 2020-01-01 PROCEDURE — 86923 COMPATIBILITY TEST ELECTRIC: CPT

## 2020-01-01 PROCEDURE — 81001 URINALYSIS AUTO W/SCOPE: CPT

## 2020-01-01 PROCEDURE — 72125 CT NECK SPINE W/O DYE: CPT | Performed by: EMERGENCY MEDICINE

## 2020-01-01 PROCEDURE — 82962 GLUCOSE BLOOD TEST: CPT

## 2020-01-01 PROCEDURE — 1111F DSCHRG MED/CURRENT MED MERGE: CPT | Performed by: INTERNAL MEDICINE

## 2020-01-01 PROCEDURE — 82607 VITAMIN B-12: CPT

## 2020-01-01 PROCEDURE — 10002803 HB RX 637: Performed by: ORTHOPAEDIC SURGERY

## 2020-01-01 PROCEDURE — 10004651 HB RX, NO CHARGE ITEM: Performed by: INTERNAL MEDICINE

## 2020-01-01 PROCEDURE — 10002803 HB RX 637: Performed by: INTERNAL MEDICINE

## 2020-01-01 PROCEDURE — 99213 OFFICE O/P EST LOW 20 MIN: CPT | Performed by: PODIATRIST

## 2020-01-01 PROCEDURE — 71045 X-RAY EXAM CHEST 1 VIEW: CPT | Performed by: HOSPITALIST

## 2020-01-01 PROCEDURE — 10002016 HB COUNTER INCENTIVE SPIROMETRY

## 2020-01-01 PROCEDURE — 97530 THERAPEUTIC ACTIVITIES: CPT

## 2020-01-01 PROCEDURE — 13000115 HB ORTHO BASIC CASE EA ADD MINUTE: Performed by: ORTHOPAEDIC SURGERY

## 2020-01-01 PROCEDURE — 97165 OT EVAL LOW COMPLEX 30 MIN: CPT

## 2020-01-01 PROCEDURE — 10002807 HB RX 258: Performed by: FAMILY MEDICINE

## 2020-01-01 PROCEDURE — 87077 CULTURE AEROBIC IDENTIFY: CPT | Performed by: EMERGENCY MEDICINE

## 2020-01-01 PROCEDURE — 93005 ELECTROCARDIOGRAM TRACING: CPT

## 2020-01-01 PROCEDURE — 13000009 HB ANESTHESIA SPINAL S/U + 1ST 15 MIN: Performed by: ORTHOPAEDIC SURGERY

## 2020-01-01 PROCEDURE — 85014 HEMATOCRIT: CPT

## 2020-01-01 PROCEDURE — 99233 SBSQ HOSP IP/OBS HIGH 50: CPT | Performed by: INTERNAL MEDICINE

## 2020-01-01 PROCEDURE — 10006031 HB ROOM CHARGE TELEMETRY

## 2020-01-01 PROCEDURE — 10002807 HB RX 258

## 2020-01-01 PROCEDURE — 99239 HOSP IP/OBS DSCHRG MGMT >30: CPT | Performed by: INTERNAL MEDICINE

## 2020-01-01 PROCEDURE — 76000 FLUOROSCOPY <1 HR PHYS/QHP: CPT

## 2020-01-01 PROCEDURE — 80048 BASIC METABOLIC PNL TOTAL CA: CPT | Performed by: EMERGENCY MEDICINE

## 2020-01-01 PROCEDURE — 99233 SBSQ HOSP IP/OBS HIGH 50: CPT | Performed by: FAMILY MEDICINE

## 2020-01-01 PROCEDURE — 10002800 HB RX 250 W HCPCS: Performed by: ORTHOPAEDIC SURGERY

## 2020-01-01 PROCEDURE — 85730 THROMBOPLASTIN TIME PARTIAL: CPT

## 2020-01-01 PROCEDURE — 73502 X-RAY EXAM HIP UNI 2-3 VIEWS: CPT

## 2020-01-01 PROCEDURE — 83880 ASSAY OF NATRIURETIC PEPTIDE: CPT

## 2020-01-01 PROCEDURE — 99214 OFFICE O/P EST MOD 30 MIN: CPT | Performed by: INTERNAL MEDICINE

## 2020-01-01 PROCEDURE — 10002807 HB RX 258: Performed by: INTERNAL MEDICINE

## 2020-01-01 PROCEDURE — 99310 SBSQ NF CARE HIGH MDM 45: CPT | Performed by: INTERNAL MEDICINE

## 2020-01-01 PROCEDURE — 99291 CRITICAL CARE FIRST HOUR: CPT | Performed by: HOSPITALIST

## 2020-01-01 PROCEDURE — 10004651 HB RX, NO CHARGE ITEM: Performed by: ORTHOPAEDIC SURGERY

## 2020-01-01 PROCEDURE — C1713 ANCHOR/SCREW BN/BN,TIS/BN: HCPCS | Performed by: ORTHOPAEDIC SURGERY

## 2020-01-01 PROCEDURE — 10002800 HB RX 250 W HCPCS: Performed by: FAMILY MEDICINE

## 2020-01-01 PROCEDURE — 10004452 HB PACU ADDL 30 MINUTES: Performed by: ORTHOPAEDIC SURGERY

## 2020-01-01 PROCEDURE — 83540 ASSAY OF IRON: CPT

## 2020-01-01 PROCEDURE — 93010 ELECTROCARDIOGRAM REPORT: CPT | Performed by: EMERGENCY MEDICINE

## 2020-01-01 PROCEDURE — 99309 SBSQ NF CARE MODERATE MDM 30: CPT | Performed by: NURSE PRACTITIONER

## 2020-01-01 PROCEDURE — 83036 HEMOGLOBIN GLYCOSYLATED A1C: CPT

## 2020-01-01 PROCEDURE — 99497 ADVNCD CARE PLAN 30 MIN: CPT | Performed by: HOSPITALIST

## 2020-01-01 PROCEDURE — 96374 THER/PROPH/DIAG INJ IV PUSH: CPT

## 2020-01-01 PROCEDURE — 70450 CT HEAD/BRAIN W/O DYE: CPT | Performed by: EMERGENCY MEDICINE

## 2020-01-01 PROCEDURE — C1769 GUIDE WIRE: HCPCS | Performed by: ORTHOPAEDIC SURGERY

## 2020-01-01 PROCEDURE — 84466 ASSAY OF TRANSFERRIN: CPT

## 2020-01-01 PROCEDURE — 99221 1ST HOSP IP/OBS SF/LOW 40: CPT | Performed by: INTERNAL MEDICINE

## 2020-01-01 PROCEDURE — 99223 1ST HOSP IP/OBS HIGH 75: CPT | Performed by: INTERNAL MEDICINE

## 2020-01-01 PROCEDURE — 0QS606Z REPOSITION RIGHT UPPER FEMUR WITH INTRAMEDULLARY INTERNAL FIXATION DEVICE, OPEN APPROACH: ICD-10-PCS | Performed by: ORTHOPAEDIC SURGERY

## 2020-01-01 PROCEDURE — 93280 PM DEVICE PROGR EVAL DUAL: CPT | Performed by: INTERNAL MEDICINE

## 2020-01-01 PROCEDURE — 99308 SBSQ NF CARE LOW MDM 20: CPT | Performed by: NURSE PRACTITIONER

## 2020-01-01 PROCEDURE — 87086 URINE CULTURE/COLONY COUNT: CPT | Performed by: EMERGENCY MEDICINE

## 2020-01-01 PROCEDURE — 73630 X-RAY EXAM OF FOOT: CPT | Performed by: PODIATRIST

## 2020-01-01 PROCEDURE — 87641 MR-STAPH DNA AMP PROBE: CPT

## 2020-01-01 PROCEDURE — 85025 COMPLETE CBC W/AUTO DIFF WBC: CPT | Performed by: EMERGENCY MEDICINE

## 2020-01-01 PROCEDURE — 90471 IMMUNIZATION ADMIN: CPT

## 2020-01-01 PROCEDURE — 93971 EXTREMITY STUDY: CPT | Performed by: EMERGENCY MEDICINE

## 2020-01-01 PROCEDURE — 86850 RBC ANTIBODY SCREEN: CPT

## 2020-01-01 PROCEDURE — 73630 X-RAY EXAM OF FOOT: CPT

## 2020-01-01 PROCEDURE — 10002801 HB RX 250 W/O HCPCS: Performed by: FAMILY MEDICINE

## 2020-01-01 PROCEDURE — 80320 DRUG SCREEN QUANTALCOHOLS: CPT | Performed by: EMERGENCY MEDICINE

## 2020-01-01 PROCEDURE — P9016 RBC LEUKOCYTES REDUCED: HCPCS

## 2020-01-01 PROCEDURE — 76705 ECHO EXAM OF ABDOMEN: CPT | Performed by: HOSPITALIST

## 2020-01-01 PROCEDURE — 10004451 HB PACU RECOVERY 1ST 30 MINUTES: Performed by: ORTHOPAEDIC SURGERY

## 2020-01-01 PROCEDURE — 93005 ELECTROCARDIOGRAM TRACING: CPT | Performed by: EMERGENCY MEDICINE

## 2020-01-01 PROCEDURE — 84439 ASSAY OF FREE THYROXINE: CPT

## 2020-01-01 PROCEDURE — 85018 HEMOGLOBIN: CPT

## 2020-01-01 PROCEDURE — 87077 CULTURE AEROBIC IDENTIFY: CPT

## 2020-01-01 PROCEDURE — 71045 X-RAY EXAM CHEST 1 VIEW: CPT | Performed by: EMERGENCY MEDICINE

## 2020-01-01 PROCEDURE — 97163 PT EVAL HIGH COMPLEX 45 MIN: CPT

## 2020-01-01 PROCEDURE — G0463 HOSPITAL OUTPT CLINIC VISIT: HCPCS | Performed by: NURSE PRACTITIONER

## 2020-01-01 PROCEDURE — 87186 SC STD MICRODIL/AGAR DIL: CPT | Performed by: EMERGENCY MEDICINE

## 2020-01-01 PROCEDURE — G0463 HOSPITAL OUTPT CLINIC VISIT: HCPCS | Performed by: INTERNAL MEDICINE

## 2020-01-01 PROCEDURE — 80053 COMPREHEN METABOLIC PANEL: CPT

## 2020-01-01 PROCEDURE — 87086 URINE CULTURE/COLONY COUNT: CPT

## 2020-01-01 PROCEDURE — 10002801 HB RX 250 W/O HCPCS

## 2020-01-01 PROCEDURE — 99232 SBSQ HOSP IP/OBS MODERATE 35: CPT | Performed by: INTERNAL MEDICINE

## 2020-01-01 PROCEDURE — 73140 X-RAY EXAM OF FINGER(S): CPT | Performed by: EMERGENCY MEDICINE

## 2020-01-01 PROCEDURE — 10006027 HB SUPPLY 278: Performed by: ORTHOPAEDIC SURGERY

## 2020-01-01 PROCEDURE — 82306 VITAMIN D 25 HYDROXY: CPT

## 2020-01-01 PROCEDURE — 80307 DRUG TEST PRSMV CHEM ANLYZR: CPT | Performed by: EMERGENCY MEDICINE

## 2020-01-01 PROCEDURE — 12001 RPR S/N/AX/GEN/TRNK 2.5CM/<: CPT

## 2020-01-01 PROCEDURE — 10002800 HB RX 250 W HCPCS

## 2020-01-01 PROCEDURE — 73501 X-RAY EXAM HIP UNI 1 VIEW: CPT

## 2020-01-01 PROCEDURE — 99223 1ST HOSP IP/OBS HIGH 75: CPT | Performed by: HOSPITALIST

## 2020-01-01 PROCEDURE — 99215 OFFICE O/P EST HI 40 MIN: CPT | Performed by: INTERNAL MEDICINE

## 2020-01-01 PROCEDURE — 99284 EMERGENCY DEPT VISIT MOD MDM: CPT

## 2020-01-01 PROCEDURE — 82728 ASSAY OF FERRITIN: CPT

## 2020-01-01 PROCEDURE — 81001 URINALYSIS AUTO W/SCOPE: CPT | Performed by: EMERGENCY MEDICINE

## 2020-01-01 PROCEDURE — 81003 URINALYSIS AUTO W/O SCOPE: CPT | Performed by: EMERGENCY MEDICINE

## 2020-01-01 PROCEDURE — 10002800 HB RX 250 W HCPCS: Performed by: EMERGENCY MEDICINE

## 2020-01-01 DEVICE — IMPLANTABLE DEVICE: Type: IMPLANTABLE DEVICE | Site: HIP | Status: FUNCTIONAL

## 2020-01-01 RX ORDER — MAGNESIUM HYDROXIDE/ALUMINUM HYDROXICE/SIMETHICONE 120; 1200; 1200 MG/30ML; MG/30ML; MG/30ML
30 SUSPENSION ORAL 4 TIMES DAILY PRN
COMMUNITY

## 2020-01-01 RX ORDER — FUROSEMIDE 20 MG/1
10 TABLET ORAL EVERY OTHER DAY
COMMUNITY
Start: 2020-01-01

## 2020-01-01 RX ORDER — MIRTAZAPINE 7.5 MG/1
7.5 TABLET, FILM COATED ORAL NIGHTLY
Qty: 30 TABLET | Refills: 0 | Status: SHIPPED | OUTPATIENT
Start: 2020-01-01 | End: 2020-01-01

## 2020-01-01 RX ORDER — ZIPRASIDONE HYDROCHLORIDE 20 MG/1
20 CAPSULE ORAL ONCE
Status: COMPLETED | OUTPATIENT
Start: 2020-01-01 | End: 2020-01-01

## 2020-01-01 RX ORDER — HYDRALAZINE HYDROCHLORIDE 20 MG/ML
10 INJECTION INTRAMUSCULAR; INTRAVENOUS EVERY 6 HOURS PRN
Status: DISCONTINUED | OUTPATIENT
Start: 2020-01-01 | End: 2020-01-01 | Stop reason: HOSPADM

## 2020-01-01 RX ORDER — IPRATROPIUM BROMIDE AND ALBUTEROL SULFATE 2.5; .5 MG/3ML; MG/3ML
3 SOLUTION RESPIRATORY (INHALATION)
Status: DISCONTINUED | OUTPATIENT
Start: 2020-01-01 | End: 2020-01-01 | Stop reason: HOSPADM

## 2020-01-01 RX ORDER — LISINOPRIL 40 MG/1
40 TABLET ORAL
Status: DISCONTINUED | OUTPATIENT
Start: 2020-01-01 | End: 2020-01-01

## 2020-01-01 RX ORDER — AMIODARONE HYDROCHLORIDE 200 MG/1
TABLET ORAL
Qty: 30 TABLET | Refills: 0 | Status: SHIPPED | OUTPATIENT
Start: 2020-01-01

## 2020-01-01 RX ORDER — ENOXAPARIN SODIUM 100 MG/ML
30 INJECTION SUBCUTANEOUS DAILY
Status: DISCONTINUED | OUTPATIENT
Start: 2020-01-01 | End: 2020-01-01

## 2020-01-01 RX ORDER — LEVOTHYROXINE SODIUM 0.07 MG/1
75 TABLET ORAL DAILY
Status: DISCONTINUED | OUTPATIENT
Start: 2020-01-01 | End: 2020-01-01

## 2020-01-01 RX ORDER — LEVOTHYROXINE SODIUM 0.07 MG/1
75 TABLET ORAL DAILY
COMMUNITY

## 2020-01-01 RX ORDER — CEFUROXIME AXETIL 500 MG/1
500 TABLET ORAL 2 TIMES DAILY
Qty: 8 TABLET | Refills: 0 | INPATIENT
Start: 2020-01-01 | End: 2020-01-01

## 2020-01-01 RX ORDER — AMIODARONE HYDROCHLORIDE 200 MG/1
200 TABLET ORAL DAILY
Status: DISCONTINUED | OUTPATIENT
Start: 2020-01-01 | End: 2020-01-01 | Stop reason: SDUPTHER

## 2020-01-01 RX ORDER — DIVALPROEX SODIUM 125 MG/1
125 TABLET, DELAYED RELEASE ORAL 3 TIMES DAILY
Status: DISCONTINUED | OUTPATIENT
Start: 2020-01-01 | End: 2020-01-01

## 2020-01-01 RX ORDER — AMOXICILLIN 250 MG
2 CAPSULE ORAL 2 TIMES DAILY
Qty: 30 TABLET | Refills: 0 | Status: SHIPPED | COMMUNITY
Start: 2020-01-01

## 2020-01-01 RX ORDER — METRONIDAZOLE 500 MG/100ML
500 INJECTION, SOLUTION INTRAVENOUS EVERY 8 HOURS
Status: DISCONTINUED | OUTPATIENT
Start: 2020-01-01 | End: 2020-01-01

## 2020-01-01 RX ORDER — ACETAMINOPHEN 325 MG/1
650 TABLET ORAL EVERY 4 HOURS PRN
COMMUNITY

## 2020-01-01 RX ORDER — ATORVASTATIN CALCIUM 10 MG/1
10 TABLET, FILM COATED ORAL DAILY
COMMUNITY
Start: 2014-10-18

## 2020-01-01 RX ORDER — AMIODARONE HYDROCHLORIDE 200 MG/1
200 TABLET ORAL DAILY
Status: DISCONTINUED | OUTPATIENT
Start: 2020-01-01 | End: 2020-01-01

## 2020-01-01 RX ORDER — DEXTROSE MONOHYDRATE 25 G/50ML
50 INJECTION, SOLUTION INTRAVENOUS
Status: DISCONTINUED | OUTPATIENT
Start: 2020-01-01 | End: 2020-01-01

## 2020-01-01 RX ORDER — PEN NEEDLE, DIABETIC, SAFETY 30 GX3/16"
NEEDLE, DISPOSABLE MISCELLANEOUS
Qty: 100 EACH | Refills: 3 | Status: SHIPPED | OUTPATIENT
Start: 2020-01-01

## 2020-01-01 RX ORDER — ATORVASTATIN CALCIUM 10 MG/1
TABLET, FILM COATED ORAL
Qty: 90 TABLET | Refills: 1 | Status: SHIPPED | OUTPATIENT
Start: 2020-01-01

## 2020-01-01 RX ORDER — LISINOPRIL 40 MG/1
40 TABLET ORAL DAILY
COMMUNITY
Start: 2014-10-21

## 2020-01-01 RX ORDER — VANCOMYCIN HYDROCHLORIDE 125 MG/1
125 CAPSULE ORAL DAILY
Status: DISCONTINUED | OUTPATIENT
Start: 2020-01-01 | End: 2020-01-01

## 2020-01-01 RX ORDER — BUPIVACAINE HYDROCHLORIDE 5 MG/ML
INJECTION, SOLUTION EPIDURAL; INTRACAUDAL PRN
Status: DISCONTINUED | OUTPATIENT
Start: 2020-01-01 | End: 2020-01-01

## 2020-01-01 RX ORDER — DIVALPROEX SODIUM 125 MG/1
TABLET, DELAYED RELEASE ORAL
Qty: 90 TABLET | Refills: 1 | Status: SHIPPED | OUTPATIENT
Start: 2020-01-01

## 2020-01-01 RX ORDER — LORAZEPAM 0.5 MG/1
0.5 TABLET ORAL EVERY 4 HOURS PRN
Status: DISCONTINUED | OUTPATIENT
Start: 2020-01-01 | End: 2020-01-01

## 2020-01-01 RX ORDER — FAMOTIDINE 20 MG/1
20 TABLET ORAL DAILY
COMMUNITY
End: 2020-01-01

## 2020-01-01 RX ORDER — AMOXICILLIN 250 MG
2 CAPSULE ORAL DAILY PRN
Status: SHIPPED | COMMUNITY
Start: 2020-01-01 | End: 2020-01-01

## 2020-01-01 RX ORDER — SODIUM CHLORIDE, SODIUM LACTATE, POTASSIUM CHLORIDE, CALCIUM CHLORIDE 600; 310; 30; 20 MG/100ML; MG/100ML; MG/100ML; MG/100ML
INJECTION, SOLUTION INTRAVENOUS CONTINUOUS
Status: DISCONTINUED | OUTPATIENT
Start: 2020-01-01 | End: 2020-01-01 | Stop reason: HOSPADM

## 2020-01-01 RX ORDER — PROPOFOL 10 MG/ML
INJECTION, EMULSION INTRAVENOUS PRN
Status: DISCONTINUED | OUTPATIENT
Start: 2020-01-01 | End: 2020-01-01

## 2020-01-01 RX ORDER — HEPARIN SODIUM 5000 [USP'U]/ML
5000 INJECTION, SOLUTION INTRAVENOUS; SUBCUTANEOUS EVERY 8 HOURS SCHEDULED
Status: COMPLETED | OUTPATIENT
Start: 2020-01-01 | End: 2020-01-01

## 2020-01-01 RX ORDER — FAMOTIDINE 20 MG/1
20 TABLET, FILM COATED ORAL DAILY
Status: DISCONTINUED | OUTPATIENT
Start: 2020-01-01 | End: 2020-01-01 | Stop reason: HOSPADM

## 2020-01-01 RX ORDER — DEXTROSE MONOHYDRATE 25 G/50ML
25 INJECTION, SOLUTION INTRAVENOUS PRN
Status: DISCONTINUED | OUTPATIENT
Start: 2020-01-01 | End: 2020-01-01 | Stop reason: HOSPADM

## 2020-01-01 RX ORDER — AMIODARONE HYDROCHLORIDE 200 MG/1
200 TABLET ORAL
Status: DISCONTINUED | OUTPATIENT
Start: 2020-01-01 | End: 2020-01-01

## 2020-01-01 RX ORDER — AMOXICILLIN 250 MG
2 CAPSULE ORAL DAILY PRN
Status: DISCONTINUED | OUTPATIENT
Start: 2020-01-01 | End: 2020-01-01 | Stop reason: HOSPADM

## 2020-01-01 RX ORDER — METOCLOPRAMIDE HYDROCHLORIDE 5 MG/ML
10 INJECTION INTRAMUSCULAR; INTRAVENOUS EVERY 6 HOURS PRN
Status: DISCONTINUED | OUTPATIENT
Start: 2020-01-01 | End: 2020-01-01 | Stop reason: HOSPADM

## 2020-01-01 RX ORDER — LEVOTHYROXINE SODIUM 0.07 MG/1
75 TABLET ORAL
Status: DISCONTINUED | OUTPATIENT
Start: 2020-01-01 | End: 2020-01-01

## 2020-01-01 RX ORDER — HYDRALAZINE HYDROCHLORIDE 20 MG/ML
5 INJECTION INTRAMUSCULAR; INTRAVENOUS EVERY 10 MIN PRN
Status: DISCONTINUED | OUTPATIENT
Start: 2020-01-01 | End: 2020-01-01 | Stop reason: HOSPADM

## 2020-01-01 RX ORDER — INSULIN GLARGINE 100 [IU]/ML
10 INJECTION, SOLUTION SUBCUTANEOUS NIGHTLY
Status: DISCONTINUED | OUTPATIENT
Start: 2020-01-01 | End: 2020-01-01

## 2020-01-01 RX ORDER — DIVALPROEX SODIUM 125 MG/1
125 CAPSULE, COATED PELLETS ORAL 3 TIMES DAILY
Status: DISCONTINUED | OUTPATIENT
Start: 2020-01-01 | End: 2020-01-01 | Stop reason: HOSPADM

## 2020-01-01 RX ORDER — AMIODARONE HYDROCHLORIDE 200 MG/1
200 TABLET ORAL DAILY
Status: DISCONTINUED | OUTPATIENT
Start: 2020-01-01 | End: 2020-01-01 | Stop reason: HOSPADM

## 2020-01-01 RX ORDER — AMLODIPINE BESYLATE 5 MG/1
5 TABLET ORAL 2 TIMES DAILY
COMMUNITY
Start: 2019-01-01

## 2020-01-01 RX ORDER — HALOPERIDOL 5 MG/ML
0.5 INJECTION INTRAMUSCULAR EVERY 6 HOURS PRN
Status: DISCONTINUED | OUTPATIENT
Start: 2020-01-01 | End: 2020-01-01

## 2020-01-01 RX ORDER — NICOTINE POLACRILEX 4 MG
15 LOZENGE BUCCAL PRN
Status: DISCONTINUED | OUTPATIENT
Start: 2020-01-01 | End: 2020-01-01 | Stop reason: HOSPADM

## 2020-01-01 RX ORDER — ACETAMINOPHEN 160 MG
TABLET,DISINTEGRATING ORAL
Qty: 90 CAPSULE | Refills: 1 | Status: SHIPPED | OUTPATIENT
Start: 2020-01-01

## 2020-01-01 RX ORDER — IPRATROPIUM BROMIDE AND ALBUTEROL SULFATE 2.5; .5 MG/3ML; MG/3ML
3 SOLUTION RESPIRATORY (INHALATION) EVERY 6 HOURS PRN
Status: DISCONTINUED | OUTPATIENT
Start: 2020-01-01 | End: 2020-01-01

## 2020-01-01 RX ORDER — LATANOPROST 50 UG/ML
1 SOLUTION/ DROPS OPHTHALMIC NIGHTLY
Status: DISCONTINUED | OUTPATIENT
Start: 2020-01-01 | End: 2020-01-01

## 2020-01-01 RX ORDER — MIRTAZAPINE 15 MG/1
15 TABLET, FILM COATED ORAL NIGHTLY
Status: DISCONTINUED | OUTPATIENT
Start: 2020-01-01 | End: 2020-01-01

## 2020-01-01 RX ORDER — BISACODYL 5 MG/1
10 TABLET, DELAYED RELEASE ORAL ONCE
Status: COMPLETED | OUTPATIENT
Start: 2020-01-01 | End: 2020-01-01

## 2020-01-01 RX ORDER — FAMOTIDINE 40 MG/1
TABLET, FILM COATED ORAL
Qty: 15 TABLET | Refills: 0 | Status: SHIPPED | OUTPATIENT
Start: 2020-01-01

## 2020-01-01 RX ORDER — TIMOLOL MALEATE 5 MG/ML
1 SOLUTION/ DROPS OPHTHALMIC 2 TIMES DAILY
COMMUNITY
Start: 2016-09-27

## 2020-01-01 RX ORDER — ASPIRIN 325 MG
325 TABLET, DELAYED RELEASE (ENTERIC COATED) ORAL DAILY
Qty: 84 TABLET | Refills: 0 | Status: SHIPPED | OUTPATIENT
Start: 2020-01-01 | End: 2020-01-01 | Stop reason: HOSPADM

## 2020-01-01 RX ORDER — TRAVOPROST OPHTHALMIC SOLUTION 0.04 MG/ML
1 SOLUTION OPHTHALMIC AT BEDTIME
COMMUNITY
Start: 2014-07-14

## 2020-01-01 RX ORDER — DIVALPROEX SODIUM 125 MG/1
125 TABLET, DELAYED RELEASE ORAL 3 TIMES DAILY
COMMUNITY

## 2020-01-01 RX ORDER — SODIUM CHLORIDE 9 MG/ML
INJECTION, SOLUTION INTRAVENOUS CONTINUOUS
Status: DISCONTINUED | OUTPATIENT
Start: 2020-01-01 | End: 2020-01-01

## 2020-01-01 RX ORDER — SODIUM CHLORIDE 9 MG/ML
INJECTION, SOLUTION INTRAVENOUS CONTINUOUS PRN
Status: DISCONTINUED | OUTPATIENT
Start: 2020-01-01 | End: 2020-01-01 | Stop reason: HOSPADM

## 2020-01-01 RX ORDER — FAMOTIDINE 20 MG/1
20 TABLET, FILM COATED ORAL DAILY
Status: ON HOLD | COMMUNITY
Start: 2019-01-01 | End: 2020-01-01

## 2020-01-01 RX ORDER — FERROUS SULFATE 325(65) MG
325 TABLET ORAL
Refills: 0 | Status: SHIPPED | COMMUNITY
Start: 2020-01-01

## 2020-01-01 RX ORDER — PANTOPRAZOLE SODIUM 40 MG/1
40 TABLET, DELAYED RELEASE ORAL
Status: DISCONTINUED | OUTPATIENT
Start: 2020-01-01 | End: 2020-01-01

## 2020-01-01 RX ORDER — HALOPERIDOL 5 MG/ML
2 INJECTION INTRAMUSCULAR ONCE
Status: COMPLETED | OUTPATIENT
Start: 2020-01-01 | End: 2020-01-01

## 2020-01-01 RX ORDER — ACETAMINOPHEN 325 MG/1
650 TABLET ORAL EVERY 4 HOURS PRN
Status: DISCONTINUED | OUTPATIENT
Start: 2020-01-01 | End: 2020-01-01 | Stop reason: HOSPADM

## 2020-01-01 RX ORDER — HYDROCODONE BITARTRATE AND ACETAMINOPHEN 5; 325 MG/1; MG/1
1 TABLET ORAL EVERY 4 HOURS PRN
Status: DISCONTINUED | OUTPATIENT
Start: 2020-01-01 | End: 2020-01-01 | Stop reason: HOSPADM

## 2020-01-01 RX ORDER — HALOPERIDOL 5 MG/ML
INJECTION INTRAMUSCULAR
Status: COMPLETED
Start: 2020-01-01 | End: 2020-01-01

## 2020-01-01 RX ORDER — MIRTAZAPINE 15 MG/1
15 TABLET, FILM COATED ORAL NIGHTLY
COMMUNITY

## 2020-01-01 RX ORDER — IPRATROPIUM BROMIDE AND ALBUTEROL SULFATE 2.5; .5 MG/3ML; MG/3ML
3 SOLUTION RESPIRATORY (INHALATION) EVERY 4 HOURS PRN
COMMUNITY
Start: 2019-01-01

## 2020-01-01 RX ORDER — CEFAZOLIN SODIUM/WATER 1 G/10 ML
1000 SYRINGE (ML) INTRAVENOUS DAILY
Status: DISCONTINUED | OUTPATIENT
Start: 2020-01-01 | End: 2020-01-01 | Stop reason: HOSPADM

## 2020-01-01 RX ORDER — 0.9 % SODIUM CHLORIDE 0.9 %
1000 INTRAVENOUS SOLUTION INTRAVENOUS ONCE
Status: DISCONTINUED | OUTPATIENT
Start: 2020-01-01 | End: 2020-01-01

## 2020-01-01 RX ORDER — MIRTAZAPINE 15 MG/1
15 TABLET, FILM COATED ORAL NIGHTLY
Status: DISCONTINUED | OUTPATIENT
Start: 2020-01-01 | End: 2020-01-01 | Stop reason: HOSPADM

## 2020-01-01 RX ORDER — ONDANSETRON 2 MG/ML
4 INJECTION INTRAMUSCULAR; INTRAVENOUS 2 TIMES DAILY PRN
Status: DISCONTINUED | OUTPATIENT
Start: 2020-01-01 | End: 2020-01-01 | Stop reason: HOSPADM

## 2020-01-01 RX ORDER — MIRTAZAPINE 15 MG/1
TABLET, FILM COATED ORAL
Qty: 30 TABLET | Refills: 1 | Status: SHIPPED | OUTPATIENT
Start: 2020-01-01

## 2020-01-01 RX ORDER — EPHEDRINE SULFATE/0.9% NACL/PF 50 MG/10ML
SYRINGE (ML) INTRAVENOUS PRN
Status: DISCONTINUED | OUTPATIENT
Start: 2020-01-01 | End: 2020-01-01

## 2020-01-01 RX ORDER — ONDANSETRON 2 MG/ML
4 INJECTION INTRAMUSCULAR; INTRAVENOUS EVERY 6 HOURS PRN
Status: DISCONTINUED | OUTPATIENT
Start: 2020-01-01 | End: 2020-01-01

## 2020-01-01 RX ORDER — AMIODARONE HYDROCHLORIDE 200 MG/1
200 TABLET ORAL DAILY
COMMUNITY
Start: 2020-01-01

## 2020-01-01 RX ORDER — HYDROCODONE BITARTRATE AND ACETAMINOPHEN 5; 325 MG/1; MG/1
1 TABLET ORAL EVERY 4 HOURS PRN
Qty: 20 TABLET | Refills: 0 | Status: SHIPPED | OUTPATIENT
Start: 2020-01-01

## 2020-01-01 RX ORDER — LEVOTHYROXINE SODIUM 0.07 MG/1
TABLET ORAL
Qty: 90 TABLET | Refills: 1 | Status: SHIPPED | OUTPATIENT
Start: 2020-01-01

## 2020-01-01 RX ORDER — NICOTINE POLACRILEX 4 MG
30 LOZENGE BUCCAL PRN
Status: DISCONTINUED | OUTPATIENT
Start: 2020-01-01 | End: 2020-01-01 | Stop reason: HOSPADM

## 2020-01-01 RX ORDER — ACETAMINOPHEN 325 MG/1
650 TABLET ORAL EVERY 4 HOURS PRN
Status: DISCONTINUED | OUTPATIENT
Start: 2020-01-01 | End: 2020-01-01

## 2020-01-01 RX ORDER — HEPARIN SODIUM 5000 [USP'U]/ML
5000 INJECTION, SOLUTION INTRAVENOUS; SUBCUTANEOUS EVERY 12 HOURS SCHEDULED
Status: DISCONTINUED | OUTPATIENT
Start: 2020-01-01 | End: 2020-01-01

## 2020-01-01 RX ORDER — LANCETS 26 GAUGE
EACH MISCELLANEOUS
Qty: 200 EACH | Refills: 3 | Status: SHIPPED | OUTPATIENT
Start: 2020-01-01

## 2020-01-01 RX ORDER — DEXAMETHASONE SODIUM PHOSPHATE 10 MG/ML
10 INJECTION, SOLUTION INTRAMUSCULAR; INTRAVENOUS ONCE
Status: DISCONTINUED | OUTPATIENT
Start: 2020-01-01 | End: 2020-01-01 | Stop reason: HOSPADM

## 2020-01-01 RX ORDER — LATANOPROST 50 UG/ML
1 SOLUTION/ DROPS OPHTHALMIC NIGHTLY
Status: DISCONTINUED | OUTPATIENT
Start: 2020-01-01 | End: 2020-01-01 | Stop reason: HOSPADM

## 2020-01-01 RX ORDER — CHOLECALCIFEROL (VITAMIN D3) 10(400)/ML
2000 DROPS ORAL DAILY
Status: DISCONTINUED | OUTPATIENT
Start: 2020-01-01 | End: 2020-01-01 | Stop reason: HOSPADM

## 2020-01-01 RX ORDER — HYDROCODONE BITARTRATE AND ACETAMINOPHEN 5; 325 MG/1; MG/1
1 TABLET ORAL EVERY 4 HOURS PRN
Status: DISCONTINUED | OUTPATIENT
Start: 2020-01-01 | End: 2020-01-01 | Stop reason: SDUPTHER

## 2020-01-01 RX ORDER — APIXABAN 2.5 MG/1
TABLET, FILM COATED ORAL
Qty: 60 TABLET | Refills: 1 | Status: SHIPPED | OUTPATIENT
Start: 2020-01-01

## 2020-01-01 RX ORDER — CEPHALEXIN 500 MG/1
500 CAPSULE ORAL 2 TIMES DAILY
Qty: 14 CAPSULE | Refills: 0 | Status: SHIPPED | OUTPATIENT
Start: 2020-01-01 | End: 2020-01-01

## 2020-01-01 RX ORDER — LORAZEPAM 0.5 MG/1
0.5 TABLET ORAL EVERY 4 HOURS PRN
COMMUNITY

## 2020-01-01 RX ORDER — 0.9 % SODIUM CHLORIDE 0.9 %
2 VIAL (ML) INJECTION EVERY 12 HOURS SCHEDULED
Status: DISCONTINUED | OUTPATIENT
Start: 2020-01-01 | End: 2020-01-01 | Stop reason: HOSPADM

## 2020-01-01 RX ORDER — LEVOTHYROXINE SODIUM 0.07 MG/1
75 TABLET ORAL DAILY
Status: DISCONTINUED | OUTPATIENT
Start: 2020-01-01 | End: 2020-01-01 | Stop reason: HOSPADM

## 2020-01-01 RX ORDER — DEXTROSE MONOHYDRATE 25 G/50ML
12.5 INJECTION, SOLUTION INTRAVENOUS PRN
Status: DISCONTINUED | OUTPATIENT
Start: 2020-01-01 | End: 2020-01-01 | Stop reason: HOSPADM

## 2020-01-01 RX ORDER — LEVOTHYROXINE SODIUM 0.07 MG/1
TABLET ORAL
COMMUNITY
Start: 2020-01-01 | End: 2020-01-01

## 2020-01-01 RX ORDER — INSULIN GLARGINE 100 [IU]/ML
14 INJECTION, SOLUTION SUBCUTANEOUS NIGHTLY
Status: DISCONTINUED | OUTPATIENT
Start: 2020-01-01 | End: 2020-01-01 | Stop reason: HOSPADM

## 2020-01-01 RX ORDER — SODIUM CHLORIDE, SODIUM LACTATE, POTASSIUM CHLORIDE, CALCIUM CHLORIDE 600; 310; 30; 20 MG/100ML; MG/100ML; MG/100ML; MG/100ML
INJECTION, SOLUTION INTRAVENOUS CONTINUOUS PRN
Status: DISCONTINUED | OUTPATIENT
Start: 2020-01-01 | End: 2020-01-01

## 2020-01-01 RX ORDER — DEXTROSE MONOHYDRATE 50 MG/ML
INJECTION, SOLUTION INTRAVENOUS CONTINUOUS PRN
Status: DISCONTINUED | OUTPATIENT
Start: 2020-01-01 | End: 2020-01-01 | Stop reason: HOSPADM

## 2020-01-01 RX ORDER — ATORVASTATIN CALCIUM 10 MG/1
10 TABLET, FILM COATED ORAL NIGHTLY
Status: DISCONTINUED | OUTPATIENT
Start: 2020-01-01 | End: 2020-01-01

## 2020-01-01 RX ORDER — ATORVASTATIN CALCIUM 10 MG/1
10 TABLET, FILM COATED ORAL DAILY
Status: DISCONTINUED | OUTPATIENT
Start: 2020-01-01 | End: 2020-01-01 | Stop reason: HOSPADM

## 2020-01-01 RX ORDER — FAMOTIDINE 40 MG/1
20 TABLET, FILM COATED ORAL DAILY
COMMUNITY

## 2020-01-01 RX ORDER — LISINOPRIL 10 MG/1
10 TABLET ORAL DAILY
Status: DISCONTINUED | OUTPATIENT
Start: 2020-01-01 | End: 2020-01-01 | Stop reason: HOSPADM

## 2020-01-01 RX ORDER — FAMOTIDINE 40 MG/1
1 TABLET, FILM COATED ORAL DAILY
COMMUNITY
Start: 2020-01-01 | End: 2020-01-01

## 2020-01-01 RX ORDER — TIMOLOL MALEATE 5 MG/ML
1 SOLUTION/ DROPS OPHTHALMIC 2 TIMES DAILY
Status: DISCONTINUED | OUTPATIENT
Start: 2020-01-01 | End: 2020-01-01

## 2020-01-01 RX ORDER — DIVALPROEX SODIUM 125 MG/1
125 CAPSULE, COATED PELLETS ORAL 3 TIMES DAILY
Status: ON HOLD | COMMUNITY
End: 2020-01-01 | Stop reason: SDUPTHER

## 2020-01-01 RX ORDER — AMLODIPINE BESYLATE 5 MG/1
5 TABLET ORAL 2 TIMES DAILY
Status: DISCONTINUED | OUTPATIENT
Start: 2020-01-01 | End: 2020-01-01 | Stop reason: HOSPADM

## 2020-01-01 RX ORDER — EPHEDRINE SULFATE/0.9% NACL/PF 50 MG/10ML
5 SYRINGE (ML) INTRAVENOUS
Status: DISCONTINUED | OUTPATIENT
Start: 2020-01-01 | End: 2020-01-01 | Stop reason: HOSPADM

## 2020-01-01 RX ORDER — TIMOLOL MALEATE 5 MG/ML
1 SOLUTION/ DROPS OPHTHALMIC 2 TIMES DAILY
Status: DISCONTINUED | OUTPATIENT
Start: 2020-01-01 | End: 2020-01-01 | Stop reason: HOSPADM

## 2020-01-01 RX ADMIN — APIXABAN 2.5 MG: 2.5 TABLET, FILM COATED ORAL at 21:40

## 2020-01-01 RX ADMIN — LATANOPROST 1 DROP: 50 SOLUTION OPHTHALMIC at 21:40

## 2020-01-01 RX ADMIN — PROPOFOL 10 MG: 10 INJECTION, EMULSION INTRAVENOUS at 17:55

## 2020-01-01 RX ADMIN — METOPROLOL TARTRATE 25 MG: 25 TABLET ORAL at 10:08

## 2020-01-01 RX ADMIN — FAMOTIDINE 20 MG: 20 TABLET ORAL at 09:27

## 2020-01-01 RX ADMIN — METOPROLOL TARTRATE 25 MG: 25 TABLET ORAL at 21:40

## 2020-01-01 RX ADMIN — BISACODYL 10 MG: 5 TABLET, COATED ORAL at 11:12

## 2020-01-01 RX ADMIN — SODIUM CHLORIDE, PRESERVATIVE FREE 2 ML: 5 INJECTION INTRAVENOUS at 21:39

## 2020-01-01 RX ADMIN — FENTANYL CITRATE 25 MCG: 50 INJECTION INTRAMUSCULAR; INTRAVENOUS at 17:22

## 2020-01-01 RX ADMIN — HYDROCODONE BITARTRATE AND ACETAMINOPHEN 1 TABLET: 5; 325 TABLET ORAL at 13:40

## 2020-01-01 RX ADMIN — LEVOTHYROXINE SODIUM 75 MCG: 75 TABLET ORAL at 10:09

## 2020-01-01 RX ADMIN — Medication 5 MG: at 17:45

## 2020-01-01 RX ADMIN — CEFTRIAXONE SODIUM 1000 MG: 100 INJECTION, POWDER, FOR SOLUTION INTRAVENOUS at 09:21

## 2020-01-01 RX ADMIN — DIVALPROEX SODIUM 125 MG: 125 CAPSULE ORAL at 10:09

## 2020-01-01 RX ADMIN — AMIODARONE HYDROCHLORIDE 200 MG: 200 TABLET ORAL at 10:11

## 2020-01-01 RX ADMIN — DIVALPROEX SODIUM 125 MG: 125 CAPSULE ORAL at 21:41

## 2020-01-01 RX ADMIN — INSULIN LISPRO 2 UNITS: 100 INJECTION, SOLUTION INTRAVENOUS; SUBCUTANEOUS at 10:10

## 2020-01-01 RX ADMIN — PROPOFOL 10 MG: 10 INJECTION, EMULSION INTRAVENOUS at 17:40

## 2020-01-01 RX ADMIN — MIRTAZAPINE 15 MG: 15 TABLET, FILM COATED ORAL at 20:38

## 2020-01-01 RX ADMIN — CEFAZOLIN SODIUM 2000 MG: 300 INJECTION, POWDER, LYOPHILIZED, FOR SOLUTION INTRAVENOUS at 00:26

## 2020-01-01 RX ADMIN — ATORVASTATIN CALCIUM 10 MG: 10 TABLET, FILM COATED ORAL at 10:12

## 2020-01-01 RX ADMIN — Medication 2000 UNITS: at 08:31

## 2020-01-01 RX ADMIN — HYDRALAZINE HYDROCHLORIDE 10 MG: 20 INJECTION INTRAMUSCULAR; INTRAVENOUS at 18:56

## 2020-01-01 RX ADMIN — LATANOPROST 1 DROP: 50 SOLUTION OPHTHALMIC at 22:59

## 2020-01-01 RX ADMIN — CEFAZOLIN SODIUM 2000 MG: 300 INJECTION, POWDER, LYOPHILIZED, FOR SOLUTION INTRAVENOUS at 10:07

## 2020-01-01 RX ADMIN — INSULIN LISPRO 2 UNITS: 100 INJECTION, SOLUTION INTRAVENOUS; SUBCUTANEOUS at 17:52

## 2020-01-01 RX ADMIN — DIVALPROEX SODIUM 125 MG: 125 CAPSULE ORAL at 10:06

## 2020-01-01 RX ADMIN — Medication 5 MG: at 18:20

## 2020-01-01 RX ADMIN — ATORVASTATIN CALCIUM 10 MG: 10 TABLET, FILM COATED ORAL at 08:29

## 2020-01-01 RX ADMIN — SODIUM CHLORIDE, PRESERVATIVE FREE 2 ML: 5 INJECTION INTRAVENOUS at 10:13

## 2020-01-01 RX ADMIN — INSULIN LISPRO 2 UNITS: 100 INJECTION, SOLUTION INTRAVENOUS; SUBCUTANEOUS at 07:59

## 2020-01-01 RX ADMIN — LATANOPROST 1 DROP: 50 SOLUTION OPHTHALMIC at 20:11

## 2020-01-01 RX ADMIN — AMLODIPINE BESYLATE 5 MG: 5 TABLET ORAL at 10:10

## 2020-01-01 RX ADMIN — Medication 2000 UNITS: at 13:52

## 2020-01-01 RX ADMIN — HEPARIN SODIUM 5000 UNITS: 5000 INJECTION, SOLUTION INTRAVENOUS; SUBCUTANEOUS at 13:57

## 2020-01-01 RX ADMIN — SODIUM CHLORIDE, PRESERVATIVE FREE 2 ML: 5 INJECTION INTRAVENOUS at 00:23

## 2020-01-01 RX ADMIN — AMLODIPINE BESYLATE 5 MG: 5 TABLET ORAL at 08:01

## 2020-01-01 RX ADMIN — TIMOLOL MALEATE 1 DROP: 5 SOLUTION/ DROPS OPHTHALMIC at 17:53

## 2020-01-01 RX ADMIN — Medication 5 MG: at 18:05

## 2020-01-01 RX ADMIN — LATANOPROST 1 DROP: 50 SOLUTION OPHTHALMIC at 20:39

## 2020-01-01 RX ADMIN — METOPROLOL TARTRATE 25 MG: 25 TABLET ORAL at 20:38

## 2020-01-01 RX ADMIN — FAMOTIDINE 20 MG: 20 TABLET ORAL at 10:10

## 2020-01-01 RX ADMIN — APIXABAN 2.5 MG: 2.5 TABLET, FILM COATED ORAL at 13:42

## 2020-01-01 RX ADMIN — ATORVASTATIN CALCIUM 10 MG: 10 TABLET, FILM COATED ORAL at 08:01

## 2020-01-01 RX ADMIN — AMIODARONE HYDROCHLORIDE 200 MG: 200 TABLET ORAL at 09:26

## 2020-01-01 RX ADMIN — SODIUM CHLORIDE, PRESERVATIVE FREE 2 ML: 5 INJECTION INTRAVENOUS at 09:27

## 2020-01-01 RX ADMIN — INSULIN GLARGINE 10 UNITS: 100 INJECTION, SOLUTION SUBCUTANEOUS at 23:01

## 2020-01-01 RX ADMIN — ASPIRIN 325 MG: 325 TABLET, COATED ORAL at 10:06

## 2020-01-01 RX ADMIN — DIVALPROEX SODIUM 125 MG: 125 CAPSULE ORAL at 13:42

## 2020-01-01 RX ADMIN — DIVALPROEX SODIUM 125 MG: 125 CAPSULE ORAL at 20:38

## 2020-01-01 RX ADMIN — METOPROLOL TARTRATE 25 MG: 25 TABLET ORAL at 08:01

## 2020-01-01 RX ADMIN — ACETAMINOPHEN 650 MG: 325 TABLET, FILM COATED ORAL at 10:06

## 2020-01-01 RX ADMIN — AMIODARONE HYDROCHLORIDE 200 MG: 200 TABLET ORAL at 10:06

## 2020-01-01 RX ADMIN — FAMOTIDINE 20 MG: 20 TABLET ORAL at 08:01

## 2020-01-01 RX ADMIN — AMIODARONE HYDROCHLORIDE 200 MG: 200 TABLET ORAL at 08:30

## 2020-01-01 RX ADMIN — MIRTAZAPINE 15 MG: 15 TABLET, FILM COATED ORAL at 21:40

## 2020-01-01 RX ADMIN — TIMOLOL MALEATE 1 DROP: 5 SOLUTION/ DROPS OPHTHALMIC at 09:26

## 2020-01-01 RX ADMIN — METOPROLOL TARTRATE 25 MG: 25 TABLET ORAL at 09:26

## 2020-01-01 RX ADMIN — FAMOTIDINE 20 MG: 20 TABLET ORAL at 10:08

## 2020-01-01 RX ADMIN — MORPHINE SULFATE 2 MG: 2 INJECTION, SOLUTION INTRAMUSCULAR; INTRAVENOUS at 20:52

## 2020-01-01 RX ADMIN — SODIUM CHLORIDE, PRESERVATIVE FREE 2 ML: 5 INJECTION INTRAVENOUS at 08:29

## 2020-01-01 RX ADMIN — METOPROLOL TARTRATE 25 MG: 25 TABLET ORAL at 20:10

## 2020-01-01 RX ADMIN — TIMOLOL MALEATE 1 DROP: 5 SOLUTION/ DROPS OPHTHALMIC at 10:13

## 2020-01-01 RX ADMIN — AMLODIPINE BESYLATE 5 MG: 5 TABLET ORAL at 20:10

## 2020-01-01 RX ADMIN — AMLODIPINE BESYLATE 5 MG: 5 TABLET ORAL at 09:26

## 2020-01-01 RX ADMIN — FAMOTIDINE 20 MG: 20 TABLET ORAL at 08:29

## 2020-01-01 RX ADMIN — SODIUM CHLORIDE: 0.9 INJECTION, SOLUTION INTRAVENOUS at 05:08

## 2020-01-01 RX ADMIN — INSULIN LISPRO 2 UNITS: 100 INJECTION, SOLUTION INTRAVENOUS; SUBCUTANEOUS at 14:58

## 2020-01-01 RX ADMIN — LISINOPRIL 10 MG: 10 TABLET ORAL at 09:26

## 2020-01-01 RX ADMIN — ACETAMINOPHEN 650 MG: 325 TABLET, FILM COATED ORAL at 22:55

## 2020-01-01 RX ADMIN — APIXABAN 2.5 MG: 2.5 TABLET, FILM COATED ORAL at 09:26

## 2020-01-01 RX ADMIN — DIVALPROEX SODIUM 125 MG: 125 CAPSULE ORAL at 08:01

## 2020-01-01 RX ADMIN — QUETIAPINE 12.5 MG: 25 TABLET, FILM COATED ORAL at 21:40

## 2020-01-01 RX ADMIN — AMLODIPINE BESYLATE 5 MG: 5 TABLET ORAL at 21:40

## 2020-01-01 RX ADMIN — CEFTRIAXONE SODIUM 1000 MG: 100 INJECTION, POWDER, FOR SOLUTION INTRAVENOUS at 08:00

## 2020-01-01 RX ADMIN — DIVALPROEX SODIUM 125 MG: 125 CAPSULE ORAL at 14:11

## 2020-01-01 RX ADMIN — CEFTRIAXONE SODIUM 1000 MG: 100 INJECTION, POWDER, FOR SOLUTION INTRAVENOUS at 08:29

## 2020-01-01 RX ADMIN — LEVOTHYROXINE SODIUM 75 MCG: 75 TABLET ORAL at 08:01

## 2020-01-01 RX ADMIN — DIVALPROEX SODIUM 125 MG: 125 CAPSULE ORAL at 08:29

## 2020-01-01 RX ADMIN — DIVALPROEX SODIUM 125 MG: 125 CAPSULE ORAL at 13:25

## 2020-01-01 RX ADMIN — INSULIN GLARGINE 14 UNITS: 100 INJECTION, SOLUTION SUBCUTANEOUS at 21:39

## 2020-01-01 RX ADMIN — ATORVASTATIN CALCIUM 10 MG: 10 TABLET, FILM COATED ORAL at 09:26

## 2020-01-01 RX ADMIN — LISINOPRIL 10 MG: 10 TABLET ORAL at 08:29

## 2020-01-01 RX ADMIN — AMLODIPINE BESYLATE 5 MG: 5 TABLET ORAL at 20:38

## 2020-01-01 RX ADMIN — SODIUM CHLORIDE: 0.9 INJECTION, SOLUTION INTRAVENOUS at 13:39

## 2020-01-01 RX ADMIN — MIRTAZAPINE 15 MG: 15 TABLET, FILM COATED ORAL at 20:10

## 2020-01-01 RX ADMIN — QUETIAPINE 12.5 MG: 25 TABLET, FILM COATED ORAL at 20:38

## 2020-01-01 RX ADMIN — SENNOSIDES AND DOCUSATE SODIUM 2 TABLET: 8.6; 5 TABLET ORAL at 08:29

## 2020-01-01 RX ADMIN — DIVALPROEX SODIUM 125 MG: 125 CAPSULE ORAL at 09:26

## 2020-01-01 RX ADMIN — SODIUM CHLORIDE: 0.9 INJECTION, SOLUTION INTRAVENOUS at 14:00

## 2020-01-01 RX ADMIN — ACETAMINOPHEN 650 MG: 325 TABLET, FILM COATED ORAL at 11:14

## 2020-01-01 RX ADMIN — HYDROCODONE BITARTRATE AND ACETAMINOPHEN 0.5 TABLET: 5; 325 TABLET ORAL at 00:21

## 2020-01-01 RX ADMIN — AMLODIPINE BESYLATE 5 MG: 5 TABLET ORAL at 10:06

## 2020-01-01 RX ADMIN — MORPHINE SULFATE 2 MG: 2 INJECTION, SOLUTION INTRAMUSCULAR; INTRAVENOUS at 00:23

## 2020-01-01 RX ADMIN — CEFTRIAXONE SODIUM 1000 MG: 100 INJECTION, POWDER, FOR SOLUTION INTRAVENOUS at 10:08

## 2020-01-01 RX ADMIN — INSULIN GLARGINE 10 UNITS: 100 INJECTION, SOLUTION SUBCUTANEOUS at 20:21

## 2020-01-01 RX ADMIN — LISINOPRIL 10 MG: 10 TABLET ORAL at 13:54

## 2020-01-01 RX ADMIN — AMIODARONE HYDROCHLORIDE 200 MG: 200 TABLET ORAL at 08:01

## 2020-01-01 RX ADMIN — BUPIVACAINE HYDROCHLORIDE 12.5 MG: 5 INJECTION, SOLUTION EPIDURAL; INTRACAUDAL; PERINEURAL at 17:30

## 2020-01-01 RX ADMIN — INSULIN LISPRO 4 UNITS: 100 INJECTION, SOLUTION INTRAVENOUS; SUBCUTANEOUS at 13:23

## 2020-01-01 RX ADMIN — CEFAZOLIN SODIUM 2000 MG: 300 INJECTION, POWDER, LYOPHILIZED, FOR SOLUTION INTRAVENOUS at 17:45

## 2020-01-01 RX ADMIN — INSULIN GLARGINE 14 UNITS: 100 INJECTION, SOLUTION SUBCUTANEOUS at 20:39

## 2020-01-01 RX ADMIN — SODIUM CHLORIDE, POTASSIUM CHLORIDE, SODIUM LACTATE AND CALCIUM CHLORIDE: 600; 310; 30; 20 INJECTION, SOLUTION INTRAVENOUS at 17:06

## 2020-01-01 RX ADMIN — ASPIRIN 325 MG: 325 TABLET, COATED ORAL at 08:29

## 2020-01-01 RX ADMIN — TIMOLOL MALEATE 1 DROP: 5 SOLUTION/ DROPS OPHTHALMIC at 10:08

## 2020-01-01 RX ADMIN — DIVALPROEX SODIUM 125 MG: 125 CAPSULE ORAL at 20:10

## 2020-01-01 RX ADMIN — Medication 2000 UNITS: at 09:26

## 2020-01-01 RX ADMIN — LISINOPRIL 10 MG: 10 TABLET ORAL at 08:02

## 2020-01-01 RX ADMIN — Medication 5 MG: at 18:15

## 2020-01-01 RX ADMIN — DIVALPROEX SODIUM 125 MG: 125 CAPSULE ORAL at 13:53

## 2020-01-01 RX ADMIN — HYDROCODONE BITARTRATE AND ACETAMINOPHEN 1 TABLET: 5; 325 TABLET ORAL at 23:43

## 2020-01-01 RX ADMIN — INSULIN LISPRO 1 UNITS: 100 INJECTION, SOLUTION INTRAVENOUS; SUBCUTANEOUS at 13:03

## 2020-01-01 RX ADMIN — CEFTRIAXONE SODIUM 1000 MG: 100 INJECTION, POWDER, FOR SOLUTION INTRAVENOUS at 10:13

## 2020-01-01 RX ADMIN — METOPROLOL TARTRATE 25 MG: 25 TABLET ORAL at 08:30

## 2020-01-01 RX ADMIN — ACETAMINOPHEN 650 MG: 325 TABLET, FILM COATED ORAL at 08:30

## 2020-01-01 RX ADMIN — SODIUM CHLORIDE: 0.9 INJECTION, SOLUTION INTRAVENOUS at 19:30

## 2020-01-01 RX ADMIN — AMLODIPINE BESYLATE 5 MG: 5 TABLET ORAL at 08:29

## 2020-01-01 ASSESSMENT — PAIN SCALES - PAIN ASSESSMENT IN ADVANCED DEMENTIA (PAINAD)
BODYLANGUAGE: TENSE, DISTRESSED, FIDGETING
FACIALEXPRESSION: FACIAL GRIMACING
CONSOLABILITY: DISTRACTED OR REASSURED BY VOICE OR TOUCH

## 2020-01-01 ASSESSMENT — PAIN SCALES - WONG BAKER
WONGBAKER_NUMERICALRESPONSE: 0
WONGBAKER_NUMERICALRESPONSE: 0
WONGBAKER_NUMERICALRESPONSE: 6
WONGBAKER_NUMERICALRESPONSE: 0
WONGBAKER_NUMERICALRESPONSE: 7
WONGBAKER_NUMERICALRESPONSE: 5
WONGBAKER_NUMERICALRESPONSE: 0
WONGBAKER_NUMERICALRESPONSE: 2

## 2020-01-01 ASSESSMENT — ACTIVITIES OF DAILY LIVING (ADL)
TOILETING: NEEDS ASSISTANCE
PRIOR_ADL: MODERATE ASSIST (MOD)
FEEDING YOURSELF: NEEDS ASSISTANCE
PRIOR_ADL_TOILETING: INDEPENDENT
PRIOR_ADL: SUPERVISION (SUPV)
TRANSFERRING: NEEDS ASSISTANCE
CONTINENCE: NEEDS ASSISTANCE
ADL_SCORE: 6
MOBILITY_ASSIST_DEVICES: STANDARD WALKER
GROOMING: MODERATE ASSIST (MOD)
CHRONIC_PAIN_PRESENT: NO
RECENT_DECLINE_ADL: NO
PRIOR_ADL_BATHING: MAXIMAL ASSIST (MAX)
ADL_BEFORE_ADMISSION: NEEDS/REQUIRES ASSISTANCE
DRESSING YOURSELF: NEEDS ASSISTANCE
ADL_SHORT_OF_BREATH: NO
BATHING: NEEDS ASSISTANCE

## 2020-01-01 ASSESSMENT — PAIN SCALES - GENERAL
PAINLEVEL_OUTOF10: 6
PAINLEVEL_OUTOF10: 0
PAINLEVEL_OUTOF10: 6
PAINLEVEL_OUTOF10: 0
PAINLEVEL_OUTOF10: 0
PAINLEVEL_OUTOF10: 3

## 2020-01-01 ASSESSMENT — COGNITIVE AND FUNCTIONAL STATUS - GENERAL
DO YOU HAVE DIFFICULTY DRESSING OR BATHING: YES
BASIC_MOBILITY_RAW_SCORE: 6
HELP NEEDED FOR BATHING: TOTAL
BASIC_MOBILITY_CONVERTED_SCORE: 16.59
HELP NEEDED DRESSING REGULAR LOWER BODY CLOTHING: TOTAL
HELP NEEDED DRESSING REGULAR UPPER BODY CLOTHING: A LOT
DAILY_ACTIVITY_CONVERTED_SCORE: 17.06
DAILY_ACTIVITY_CONVERTED_SCORE: 17.06
HELP NEEDED DRESSING REGULAR UPPER BODY CLOTHING: TOTAL
DAILY_ACTIVITY_RAW_SCORE: 6
HELP NEEDED FOR PERSONAL GROOMING: A LITTLE
HELP NEEDED FOR BATHING: TOTAL
DAILY_ACTIVITY_RAW_SCORE: 6
ARE YOU BLIND OR DO YOU HAVE SERIOUS DIFFICULTY SEEING, EVEN WHEN WEARING GLASSES: NO
HELP NEEDED FOR PERSONAL GROOMING: TOTAL
HELP NEEDED FOR TOILETING: TOTAL
HELP NEEDED FOR BATHING: A LOT
HELP NEEDED FOR TOILETING: TOTAL
BASIC_MOBILITY_CONVERTED_SCORE: 16.59
DAILY_ACTIVITY_RAW_SCORE: 12
HELP NEEDED DRESSING REGULAR LOWER BODY CLOTHING: TOTAL
BECAUSE OF A PHYSICAL, MENTAL, OR EMOTIONAL CONDITION, DO YOU HAVE SERIOUS DIFFICULTY CONCENTRATING, REMEMBERING OR MAKING DECISIONS: YES
DAILY_ACTIVITY_CONVERTED_SCORE: 30.60
BASIC_MOBILITY_CONVERTED_SCORE: 16.59
ARE YOU DEAF OR DO YOU HAVE SERIOUS DIFFICULTY  HEARING: NO
HELP NEEDED FOR TOILETING: TOTAL
BECAUSE OF A PHYSICAL, MENTAL, OR EMOTIONAL CONDITION, DO YOU HAVE DIFFICULTY DOING ERRANDS ALONE: YES
HELP NEEDED DRESSING REGULAR UPPER BODY CLOTHING: TOTAL
HELP NEEDED DRESSING REGULAR LOWER BODY CLOTHING: TOTAL
BASIC_MOBILITY_RAW_SCORE: 6
BASIC_MOBILITY_RAW_SCORE: 6
DO YOU HAVE SERIOUS DIFFICULTY WALKING OR CLIMBING STAIRS: YES
HELP NEEDED FOR PERSONAL GROOMING: TOTAL

## 2020-01-01 ASSESSMENT — LIFESTYLE VARIABLES
HOW MANY STANDARD DRINKS CONTAINING ALCOHOL DO YOU HAVE ON A TYPICAL DAY: 0,1 OR 2
AUDIT-C TOTAL SCORE: 0
ALCOHOL_USE_STATUS: NO OR LOW RISK WITH VALIDATED TOOL
HOW OFTEN DO YOU HAVE 6 OR MORE DRINKS ON ONE OCCASION: NEVER
HOW OFTEN DO YOU HAVE A DRINK CONTAINING ALCOHOL: NEVER

## 2020-01-01 ASSESSMENT — COLUMBIA-SUICIDE SEVERITY RATING SCALE - C-SSRS: IS THE PATIENT ABLE TO COMPLETE C-SSRS: NO, DEFER TO LATER TIME

## 2020-01-01 ASSESSMENT — PATIENT HEALTH QUESTIONNAIRE - PHQ9: IS PATIENT ABLE TO COMPLETE PHQ2 OR PHQ9: NO, PATIENT WILL NEVER BE ABLE TO COMPLETE

## 2020-01-01 ASSESSMENT — PULMONARY FUNCTION TESTS: FEV1/FVC: UNABLE TO OBTAIN, OR GREATER THAN 70%

## 2020-01-02 NOTE — DISCHARGE SUMMARY
[facility]    PATIENT'S NAME: Job RAPP   ATTENDING PHYSICIAN: Curtis Vogel MD   PATIENT ACCOUNT#:   [id number]    LOCATION:  [address] [place] [address] Road #:   [id number]       YOB: 1926  ADMISSION DATE:       12/2 status post pacemaker placement. Continue medications. 2.   Acute hypoxic respiratory failure from pneumonia and decompensation of diastolic congestive heart failure. Chest x-ray much improved. IV Lasix was stopped. Small doses were given orally.   [address] [facility] [id number]  [facility]/

## 2020-01-04 NOTE — ED PROVIDER NOTES
Patient Seen in: [facility] Emergency Department      History   Patient presents with:  Altered Mental Status    Stated Complaint: AMS after unwitnessed fall yesterday    HPI    80year old female with pmh CLL, DM, HTN, dementia, colon ca, meningi All other systems reviewed and negative except as noted above.     Physical Exam     ED Triage Vitals [01/04/20 1450]   /44   Pulse 60   Resp 16   Temp 98.7 °F (37.1 °C)   Temp src Oral   SpO2 99 %   O2 Device None (Room air)       Current:/53 URINALYSIS WITH CULTURE REFLEX - Abnormal; Notable for the following components:       Result Value    Clarity Urine Hazy (*)     Protein Urine Trace (*)     Leukocyte Esterase Urine Moderate (*)     WBC Urine 6 (*)     Bacteria Urine Few (*)     All other Radiology exams  Viewed and reviewed by myself and findings discussed with patient including need for follow up    - ct neg for acute injury  - left middle finger bruised, no ttp or swelling and xr finger neg, may be sprain or secondary to pacemaker site h

## 2020-01-07 NOTE — PATIENT INSTRUCTIONS
1. Pacemaker appt same day as Dr. Alphonse Gama  2. Home check of your device (press button on monitor)  3.  Start eliquis 2.5mg twice a day

## 2020-01-07 NOTE — PROGRESS NOTES
HPI: Sonu Patino  Is a 79 yo female admitted to [facility] with confusion. Found to have sinus pauses and bradycardia s/p pacemaker. Neurology evaluated, felt confusion as due to cardiac issues.   The patient did well, and her diuresis was temporarily placed reaction(s): SULFADIAZINE      CURRENT MEDICATIONS: Reviewed on SNF EMR  VITALS: Reviewed   LABS/Imaging: Reviewed     SUBJECTIVE/REVIEW OF SYSTEMS:  Caregiver at bedside. Patient is confused. She was refusing some of her medications yesterday.  Per staff s outpt as well as IR  - wound care in rehab following, L 3rd toe betadine with dressing qd     Chronic lymphocytic leukemia  -monitor CBC     Hypertension  -cont metoprolol, lisinopril, norvasc, lasix     Diabetes- not controlled  -increased 1/6: levemir 17

## 2020-01-07 NOTE — PROGRESS NOTES
Roberta Wade is a 80year old female. Patient presents with:  Consult  Pacemaker Reprogramming: wound check, pacemaker placement 12/21/19    HPI:   Patient comes in today for hospital follow-up.  She sees Dr. Jonna Alfonso she had episodes of being confused an needed. 1 vial 0   • vancomycin HCl 50 MG/ML Oral Recon Soln Take 2.5 mL (125 mg total) by mouth daily. 105 mL 0   • amiodarone HCl 200 MG Oral Tab Take 1 tablet (200 mg total) by mouth daily.  30 tablet 1   • metoprolol Tartrate 25 MG Oral Tab Take 1 table Alcohol/week: 0.0 standard drinks    Drug use: No       REVIEW OF SYSTEMS:   GENERAL HEALTH: feels well otherwise  SKIN: denies any unusual skin lesions or rashes  RESPIRATORY: denies shortness of breath with exertion  CARDIOVASCULAR: no chest pain  GI: de

## 2020-01-10 NOTE — PROGRESS NOTES
HPI: Sonu Patino  Is a 79 yo female admitted to [facility] with confusion. Found to have sinus pauses and bradycardia s/p pacemaker. Neurology evaluated, felt confusion as due to cardiac issues.   The patient did well, and her diuresis was temporarily placed reaction(s): SULFADIAZINE      CURRENT MEDICATIONS: Reviewed on SNF EMR  VITALS: Reviewed   LABS/Imaging: Reviewed     SUBJECTIVE/REVIEW OF SYSTEMS:  Caregiver at bedside. Patient is sleeping in room, easily arousable. Per caregiver she awake last night.

## 2020-01-13 NOTE — TELEPHONE ENCOUNTER
I  Cannot see pt  This   Week unless you  Fined  Res  Slots , cannot add more pts only on Saturday  At 11;40 AM  DOUBLE  BOOK , PT  CAN SEE OTHER PROVIDERS  available  THIS  WEEK

## 2020-01-13 NOTE — TELEPHONE ENCOUNTER
Patient daughter stt that patient needs a sooner appointment with  for a HFU due to patient getting  Pacemaker and also caught a few infection.      Dr can we use res 24 or double book

## 2020-01-14 NOTE — TELEPHONE ENCOUNTER
Spoke with Amol Acuña from [employer] she faxed over a note to [facility] office today for Dr. Larissa Velasco. \"The patient is seeing Dr. Larissa Velasco tomorrow, but I am asking Dr. Larissa Velasco to prescribe something for anxiety tonight--the note will explain it all. \

## 2020-01-15 NOTE — TELEPHONE ENCOUNTER
Irma Scripture from [facility] stated that the test strips were denied by patient's insurance because Medicare needs progress notes. Please fax progress notes of this patient to [facility]. Fax #[phone]    Thank you.

## 2020-01-15 NOTE — TELEPHONE ENCOUNTER
Dr. Christine Teague, please see message below and advise on order.      Please reply to pool: EM TRIAGE SUPPORT

## 2020-01-15 NOTE — TELEPHONE ENCOUNTER
I need to see pt  First before I alva order  meds , pt has appt  Tomorrow will decide tomorrow, advised to give pt  Tylenol 500 mg  tonight

## 2020-01-15 NOTE — TELEPHONE ENCOUNTER
Colin Vu from [address] [place] [address] calling to follow up with prescription that doctor was supposed to send to pharmacy. She called pharmacy but no script yet. Requesting medication to be sent over as soon as possible so [facility] could [facility] Please advise. Mirtazapine 7.5 mg    She stated if you cannot reach her at  #, you could also try her cell once prescription goes through.     Cell # [phone]

## 2020-01-15 NOTE — TELEPHONE ENCOUNTER
Faxed referral to [facility] at [phone]. Note: discontinue plavix per DR. Shania Wilcox. Pt. Chart updated.

## 2020-01-16 NOTE — ED NOTES
Pt provided with discharge instructions. Verbalized understanding for plan of care at home and follow up. All questions/concerns addressed prior to discharge. Care endorsed to [facility].
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
## 2020-01-16 NOTE — ED NOTES
This tech assumed direct observation of pt. Psych liaison Metro Schaumann is at bedside with pt and daughter.

## 2020-01-16 NOTE — PROGRESS NOTES
HPI:    Patient ID: Maria R Ruiz is a 80year old female. Presents for follow-up after returning home from rehabilitation.     HPI  Patient is seen accompanied by her daughter, patient underwent pacemaker insertion for complete heart block about 3 wee elevated and hemoglobin used to be normal.  Patient has been taking furosemide 10 mg every other day which is new medication  Review of Systems   Constitutional: Positive for activity change, appetite change, fatigue and unexpected weight change.    HENT: N 300 each 3   • Timolol Maleate 0.5 % Ophthalmic Solution Place 1 drop into both eyes 2 (two) times daily. • TRAVATAN Z 0.004 % Ophthalmic Solution Apply 1 drop to eye nightly.        • mirtazapine 7.5 MG Oral Tab Take 1 tablet (7.5 mg total) by mouth ASSESSMENT/PLAN:   Other fatigue  (primary encounter diagnosis) multifactorial, recovering from the pacemaker insertion new anemia, congestive heart failure, will check labs CBC BMP TSH  Other iron deficiency anemia  Atrial fibrillation and flutter (hcc)

## 2020-01-16 NOTE — CM/SW NOTE
Spoke with Edgar Hartman covering for Carla Mata - per Maira Horan a Jyotsna Friedman was arranged for patient discharge, however patient a&o x 1, for patient's safety this RN called [facility] and changed [facility] to BLS - BLS ETA [id number].   PCS completed - original with face

## 2020-01-16 NOTE — TELEPHONE ENCOUNTER
Per tatiana, need to talk to nurses due to patient is having behavioral issue need to know what to do. Transfer call to triage.

## 2020-01-16 NOTE — TELEPHONE ENCOUNTER
Spoke to patient's daughter, and manager at assisted living [facility] [phone], spoke to Dr. Xochilt Candelario ER physician at [facility], and advised was that patient should be taken to [facility] emergency room for evaluation today and perhaps transfer to [facility] for further treatment and evaluation. I advised that daughter should be with patient today during evaluation. Manager will speak to patient's daughter Anila Kee about this.
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
## 2020-01-16 NOTE — ED NOTES
Ambulated pt to nurses station and bathroom, pt with steady gait, states she uses a walker.  Laura Patel (ext [phone]) at [facility]

## 2020-01-16 NOTE — TELEPHONE ENCOUNTER
Spoke with Marge Hammond RN asking for Mirtazapine order/ med  ( see note below )     Upon chart review do not see it listed or mentioned     Please advise and thank you.

## 2020-01-16 NOTE — ED NOTES
No urine in suction canaster, depends changed, pt/daughter updated on plan of care-awaiting psych transfer. Pt remains calm and cooperative. Daughter inquiring about blood sugar checks and when pt gets insulin.   Med list updated, accucheck 180, spoke wit

## 2020-01-16 NOTE — ED NOTES
Consulted with Dr Natasha Segal regarding the patient. Dorita Feliciano was seen in the ER last night. She has been sundowning and becomes aggressive in the evening. Dorita Feliciano was medicated in the ER and returned to [facility]. Dorita Feliciano returns to the ER.

## 2020-01-16 NOTE — ED INITIAL ASSESSMENT (HPI)
SEEN BY DR Haley Banks YESTERDAY AND WAS TOLD  SHE WAS NOT ABLE TO RETURN TO THE NURSING HOME RELATED TO AGITATION AT NIGHT

## 2020-01-16 NOTE — ED PROVIDER NOTES
Patient Seen in: [facility] Emergency Department      History   Patient presents with:  Eval-P    Stated Complaint:     HPI    Patient presents to the emergency department after being seen yesterday for agitation.   History is obtained from her sol 1231 98.3 °F (36.8 °C)   Temp src 01/16/20 1231 Oral   SpO2 01/16/20 1215 96 %   O2 Device 01/16/20 1748 None (Room air)       Current:/54   Pulse 69   Temp 98.3 °F (36.8 °C) (Oral)   Resp 16   Ht 162.6 cm (5' 4\")   SpO2 98%   BMI 18.88 kg/m² following components:    POC Glucose  206 (*)     All other components within normal limits   POCT GLUCOSE - Abnormal; Notable for the following components:    POC Glucose  128 (*)     All other components within normal limits   POCT GLUCOSE - Abnormal; No care provider within the next three months to obtain basic health screening including reassessment of your blood pressure.     Medications Prescribed:  Discharge Medication List as of 1/17/2020  7:31 PM
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
## 2020-01-17 NOTE — ED NOTES
Lashaun Muir (Daughter/POA) would like to be contacted with placement and any changes in POC.    Home number-FOR EMERGENCY LHYJ-[phone]  Cell-FOR UPDATE- [phone]

## 2020-01-17 NOTE — ED NOTES
Updated pts daughter, Kristie Mcfarland on plan of care - requested to be contacted at: [phone].

## 2020-01-17 NOTE — ED NOTES
This tech has taken over 1:1 seclusion for this pt, pt is sleeping on the cart, notified that pt ate breakfast, all needs are met at this time

## 2020-01-17 NOTE — ED PROVIDER NOTES
Signout taken with disposition of psychiatric transfer in patient becoming agitated/paranoid in NH and medically cleared for psychiatric transfer. ED course requiring oral ziprasidone for increasing agitation, ED course otherwise nonacute.

## 2020-01-17 NOTE — ED PROVIDER NOTES
Patient Seen in: [facility] Emergency Department    History   Patient presents with:  Altered Mental Status    Stated Complaint:     HPI    Patient sent from snf after becoming paranoid and reportedly yelling at staff.   On arrival to ER calm coope UNIT/ML Subcutaneous Solution Pen-injector,  Inject 15 Units into the skin daily. Patient taking differently: Inject 17 Units into the skin daily.      ipratropium-albuterol 0.5-2.5 (3) MG/3ML Inhalation Solution,  Take 3 mL by nebulization every 4 (four) HPI.  Constitutional and vital signs reviewed. All other systems reviewed and negative except as noted above. PSFH elements reviewed from today and agreed except as otherwise stated in HPI.     Physical Exam     ED Triage Vitals [01/15/20 2120]   BP following components:    POC Glucose  268 (*)     All other components within normal limits   CBC W/ DIFFERENTIAL - Abnormal; Notable for the following components:    WBC 16.0 (*)     RBC 3.50 (*)     HGB 9.9 (*)     HCT 31.1 (*)     RDW-SD 49.9 (*)     RD

## 2020-01-17 NOTE — ED NOTES
Pt is accepted at [facility] by Dr. Burton Pereyra. Transfer will need to take place in AM. [facility] will call this ED to let us know when we can send the pt.

## 2020-01-17 NOTE — ED NOTES
Pt supplied with pillow and blanket, all needs met at this time.  Accucheck performed by this RN, [employer]

## 2020-01-17 NOTE — ED NOTES
Spoke with charge at [facility]. They are unable to accept pt at this time due to wound care, instead they suggest a medical admit.

## 2020-01-17 NOTE — ED NOTES
Pt woken up for v/s and change of depends. Pt calm and cooperative with staff. Fresh cup of water provided for pt. Pt notified that breakfast tray has been ordered. Pt confused, asking when she can get dressed in order to go home.  RN reoriented pt to situa

## 2020-01-17 NOTE — ED NOTES
Pt sleeping. Bed is in low & locked position. Call light in reach. ER tech outside room for seclusion. Will continue to monitor.

## 2020-01-17 NOTE — ED NOTES
Patient ambulated to bathroom with one assist. Alert and oriented at this time x's 2, but calm and cooperative.

## 2020-01-17 NOTE — ED NOTES
Report received from [facility]. Pt asleep. Bed is in low position & locked. Call light in reach. Pt aware of plan of care. Will continue to monitor.

## 2020-01-18 NOTE — ED NOTES
Updated certificate faxed to [facility] at their request.   Certificate faxed to [phone]

## 2020-02-04 NOTE — TELEPHONE ENCOUNTER
Per Josh Manley they faxed a form late yesterday and again this morning. Josh Manley, states that the patient was admitted there and they would like the form filled out and faxed back to them at .

## 2020-02-04 NOTE — TELEPHONE ENCOUNTER
Nurse stated  this afternoon, was given 8 units per s/s to give for over 350  Asking if she need more units     Stated this was probably r/t Pt had oatmeal, raisin toast and orange juice for breakfast

## 2020-02-04 NOTE — TELEPHONE ENCOUNTER
Spoke to nurse Rodrick Wilcox, patient is admitted for respite care to [facility], home health services will get involved.   They are doing wound dressing every day, I requested that they encourage patient to see podiatrist for follow-up, and I would like to see pa

## 2020-02-04 NOTE — TELEPHONE ENCOUNTER
Spoke to the nurse, she reports that patient breakfast which contained food high in sugar, she will monitor patient, they will make adjustment to decrease high sugar containing foods for this patient, will monitor blood sugar 3 times a day, I requested loida

## 2020-02-04 NOTE — TELEPHONE ENCOUNTER
Danita Galindo from [facility] wants to know if Dr. Emil Ruiz is the PCP. Patient needs an order for home health with 's signature and patient's last visit notes. Please fax them to  #[phone]    Thank you.

## 2020-02-06 NOTE — TELEPHONE ENCOUNTER
Refill passed per [facility] protocol.   Cholesterol Medications  Protocol Criteria:  · Appointment scheduled in the past 12 months or in the next 3 months  · ALT & LDL on file in the past 12 months  · ALT result < 80  · LDL result <130   Recent Outpat

## 2020-02-11 PROBLEM — I34.0 NONRHEUMATIC MITRAL VALVE REGURGITATION: Status: ACTIVE | Noted: 2020-01-01

## 2020-02-11 PROBLEM — E78.49 OTHER HYPERLIPIDEMIA: Status: ACTIVE | Noted: 2017-01-11

## 2020-02-11 PROBLEM — Z95.0 PACEMAKER: Status: ACTIVE | Noted: 2020-01-01

## 2020-02-11 NOTE — PROGRESS NOTES
[facility] CLINIC  PROGRESS NOTE    Christopher Parson is a 80year old female. Patient presents with:   Follow - Up: 6 week    HPI:   This is a pleasant 80year old female with sick sinus syndrome paroxysmal atrial fibrillation hypertension who was in the hosp Pen-injector Inject 15 Units into the skin daily. (Patient taking differently: Inject 17 Units into the skin daily. ) 1 pen 0   • ipratropium-albuterol 0.5-2.5 (3) MG/3ML Inhalation Solution Take 3 mL by nebulization every 4 (four) hours as needed.  1 vial No    Family History  Family History   Problem Relation Age of Onset   • Heart Disorder Mother         CHF   • Heart Disease Father         CAD   • Heart Disease Sister         CAD   • Cancer Sister         lung cancer (cause of death)    • Alcohol and Oth pacemaker. Patient and daughter will call to clarify medicines. If she is on amiodarone will need amiodarone labs for follow-up. She is set up to follow-up with the pacemaker clinic as well.   We will continue blood thinners if she is taking them to decr

## 2020-02-11 NOTE — PATIENT INSTRUCTIONS
See WARREN Mancilla in 3 months    Call office with medication lists when you get home    Call if new symptoms    Schedule echocardiogram to be done and since just prior to follow-up visit with     Blood test in 2  weeks

## 2020-02-18 NOTE — TELEPHONE ENCOUNTER
Patient had unwitnessed fall 2/17/20   Patient has her own apartment was attempting to transfer from chair and fell. Patient was found 8 pm between wall and chair. No injury noted. No complaint of pain, no bruising, no skin tear.    Informing Dr. Monique nix

## 2020-02-21 NOTE — TELEPHONE ENCOUNTER
Refill passed per [facility] protocol.   Refill Protocol Appointment Criteria  · Appointment scheduled in the past 12 months or in the next 3 months  Recent Outpatient Visits            1 week ago Pacemaker    [facility]

## 2020-02-21 NOTE — TELEPHONE ENCOUNTER
Received phone call from [facility] Ivette, from [facility] who reports that patient is currently being treated for a diabetic wound between toes of  left 3rd and 4th digits.     RN reports staff has been applying betadine which is leaving wound too wet

## 2020-02-25 NOTE — PROGRESS NOTES
Diego Moctezuma is a 80year old female. Patient presents with:  Wound: LOV 12/16/19. left foot wound. pt feels the wound has improved. at facility they are using aquacel drsg. seen by wound care specialist 3x/wk. pt denies pain now.          HPI:   Ju metoprolol Tartrate 25 MG Oral Tab Take 1 tablet (25 mg total) by mouth 2x Daily(Beta Blocker). 60 tablet 1   • furosemide 20 MG Oral Tab Take 0.5 tablets (10 mg total) by mouth every other day.  30 tablet 1   • AMLODIPINE BESYLATE 5 MG Oral Tab TAKE 1 TABL ENDOSCOPY,BIOPSY  2010      Family History   Problem Relation Age of Onset   • Heart Disorder Mother         CHF   • Heart Disease Father         CAD   • Heart Disease Sister         CAD   • Cancer Sister         lung cancer (cause of death)    • Alcohol a XR FOOT, COMPLETE (MIN 3 VIEWS), LEFT (CPT=73630); Future    Ulcer of toe of left foot, unspecified ulcer stage (HCC)  -     XR FOOT, COMPLETE (MIN 3 VIEWS), LEFT (CPT=73630); Future        Plan:  At this particular point time I will do an x-ray as told th

## 2020-02-26 NOTE — TELEPHONE ENCOUNTER
LR 1-7-20 by KIKA Richardson    Review pended refill request as it does not fall under a protocol.   Requested Prescriptions     Pending Prescriptions Disp Refills   • ELIQUIS 2.5 MG Oral Tab [Pharmacy Med Name: Eliquis 2.5 mg tablet] 60 tablet 1

## 2020-02-27 NOTE — TELEPHONE ENCOUNTER
RN unable to locate vitamin D and Divalproex in med list    Please review; protocol failed.  D/tl labs levothyroxine  Requested Prescriptions     Pending Prescriptions Disp Refills   • VITAMIN D3 48 MCG (2000 UT) Oral Cap [Pharmacy Med Name: cholecalciferol

## 2020-02-27 NOTE — TELEPHONE ENCOUNTER
I called the patient's daughter and left a message about the findings of the x-ray being osteomyelitis I think amputation might be the best way year. She can call back or make an appointment with Dr. Nadine Fu.

## 2020-03-04 NOTE — ED PROVIDER NOTES
Patient Seen in: [facility] AND [facility] Emergency Department      History   Patient presents with:  Fall    Stated Complaint: fall    HPI    80-year-old female with dementia, sick sinus syndrome status post pacemaker, paroxysmal A. fib on Eliquis, diabetes, h above.    Physical Exam     ED Triage Vitals [03/03/20 2001]   BP (!) 162/75   Pulse 61   Resp 16   Temp 98 °F (36.7 °C)   Temp src Oral   SpO2 100 %   O2 Device None (Room air)       Current:BP (!) 165/57   Pulse 67   Temp 98 °F (36.7 °C) (Oral)   Resp 18 WBC 14.3 (*)     RDW-SD 51.7 (*)     RDW 15.9 (*)     All other components within normal limits   SCAN SLIDE   CBC WITH DIFFERENTIAL WITH PLATELET    Narrative: The following orders were created for panel order CBC WITH DIFFERENTIAL WITH PLATELET. deposition arthropathy. 4. Bilateral carotid bifurcation atherosclerosis. 5. Subcentimeter thyroid nodules.    Dictated by (CST): Shea Montez MD on 3/03/2020 at 8:56 PM     Finalized by (CST): Shea Montez MD on 3/03/2020 at 9:00 PM

## 2020-03-04 NOTE — ED INITIAL ASSESSMENT (HPI)
Patient presents via EMS from [facility] after an unwitnessed fall. Patient with laceration to back of the head.

## 2020-03-04 NOTE — ED NOTES
Pt states she was startled by someone coming in her room-causing her to lose her balance and fall. +Laceration to posterior head. +Elequis. A&O x 2 to self and place at baseline. Arrives with c-collar on.

## 2020-03-05 NOTE — PROGRESS NOTES
Rosalba Teague is a 80year old female. Patient presents with:  Diabetic Foot Care: FBS was taken in [place] but don't remember what the number is. Patient is referred by Dr Horace Winkler for a 2nd opinion.  A1C was done on 2/25/2020 with the result of 8.1, Pen-injector Inject 15 Units into the skin daily. (Patient taking differently: Inject 17 Units into the skin daily. ) 1 pen 0   • ipratropium-albuterol 0.5-2.5 (3) MG/3ML Inhalation Solution Take 3 mL by nebulization every 4 (four) hours as needed.  1 vial 1975   • KIDNEY SURGERY Right 1950s    pyeloplasty   • UPPER GI ENDOSCOPY,BIOPSY  2010      Family History   Problem Relation Age of Onset   • Heart Disorder Mother         CHF   • Heart Disease Father         CAD   • Heart Disease Sister         CAD   • C Diagnoses and all orders for this visit:    Type 2 diabetes mellitus without complication, with long-term current use of insulin (HCC)    Ulcer of toe of left foot, unspecified ulcer stage ([facility] [address]    Other chronic osteomyelitis of left foot (HCC)        Pl

## 2020-03-09 NOTE — TELEPHONE ENCOUNTER
Refill passed per [facility] protocol.   Diabetic Supplies  Protocol Criteria:  · Appointment scheduled in past 12 months or the next 3 months

## 2020-03-09 NOTE — TELEPHONE ENCOUNTER
[facility] Nurse at [facility] pt is low on lancets but has enough strips.

## 2020-03-12 NOTE — TELEPHONE ENCOUNTER
Review pended refill request as it does not fall under a protocol. Last Rx: 12/28/19  LOV: 1 month ago    Refill passed per [facility] protocol.   Refill Protocol Appointment Criteria  · Appointment scheduled in the past 12 months or in the next 3 m

## 2020-03-14 PROBLEM — I10 HYPERTENSION: Status: ACTIVE | Noted: 2020-01-01

## 2020-03-14 PROBLEM — Z86.73 HISTORY OF CVA IN ADULTHOOD: Status: ACTIVE | Noted: 2020-01-01

## 2020-03-14 PROBLEM — D72.829 LEUKOCYTOSIS: Status: ACTIVE | Noted: 2020-01-01

## 2020-03-14 PROBLEM — I48.91 ATRIAL FIBRILLATION (CMD): Status: ACTIVE | Noted: 2020-01-01

## 2020-03-14 PROBLEM — F03.90 DEMENTIA (CMD): Status: ACTIVE | Noted: 2020-01-01

## 2020-03-14 PROBLEM — E78.5 DYSLIPIDEMIA: Status: ACTIVE | Noted: 2020-01-01

## 2020-03-14 PROBLEM — M25.551 RIGHT HIP PAIN: Status: ACTIVE | Noted: 2020-01-01

## 2020-03-14 PROBLEM — E11.9 T2DM (TYPE 2 DIABETES MELLITUS) (CMD): Status: ACTIVE | Noted: 2020-01-01

## 2020-03-14 NOTE — TELEPHONE ENCOUNTER
Spoke to [facility] assisted living [facility], reviewed change in patient management. Order faxed to .   Phone #[phone]/

## 2020-03-15 PROBLEM — M25.551 RIGHT HIP PAIN: Status: RESOLVED | Noted: 2020-01-01 | Resolved: 2020-01-01

## 2020-03-15 PROBLEM — F01.50 VASCULAR DEMENTIA WITHOUT BEHAVIORAL DISTURBANCE (HCC): Status: ACTIVE | Noted: 2020-01-01

## 2020-03-15 PROBLEM — M86.9 OSTEOMYELITIS OF SECOND TOE OF LEFT FOOT (CMD): Status: ACTIVE | Noted: 2020-01-01

## 2020-03-15 PROBLEM — R41.0 DISORIENTATION: Status: RESOLVED | Noted: 2019-01-01 | Resolved: 2020-01-01

## 2020-03-15 PROBLEM — I11.9 HYPERTENSIVE HEART DISEASE: Status: ACTIVE | Noted: 2020-01-01

## 2020-03-15 PROBLEM — E86.0 DEHYDRATION: Status: ACTIVE | Noted: 2020-01-01

## 2020-03-15 PROBLEM — N39.0 UTI (URINARY TRACT INFECTION): Status: ACTIVE | Noted: 2020-01-01

## 2020-03-15 PROBLEM — S72.143A CLOSED FRACTURE OF INTERTROCHANTERIC SECTION OF FEMUR (CMD): Status: ACTIVE | Noted: 2020-01-01

## 2020-03-15 PROBLEM — D72.829 LEUKOCYTOSIS: Status: RESOLVED | Noted: 2020-01-01 | Resolved: 2020-01-01

## 2020-03-16 NOTE — PROGRESS NOTES
HPI:    Patient ID: Norbert Prescott is a 80year old female.   Presents for follow-up on multiple medical conditions    HPI  Recently patient was treated in psychiatric institution for progressive dementia with behavioral changes, medications were adjuste and visual disturbance. Respiratory: Negative for cough, chest tightness and shortness of breath. Gastrointestinal: Negative for nausea, vomiting and abdominal pain. Genitourinary: Positive for bladder incontinence. Skin: Positive for wound.  Anali Renteria Inhalation Solution Take 3 mL by nebulization every 4 (four) hours as needed. 1 vial 0   • furosemide 20 MG Oral Tab Take 0.5 tablets (10 mg total) by mouth every other day.  30 tablet 1   • AMLODIPINE BESYLATE 5 MG Oral Tab TAKE 1 TABLET BY MOUTH EVERY 12 present. Pulmonary/Chest: No respiratory distress. She has no wheezes. Abdominal: Soft. She exhibits no distension. There is no tenderness. There is no rebound and no guarding. No hernia. Lymphadenopathy:     She has no cervical adenopathy.    Neurolog

## 2020-03-18 NOTE — TELEPHONE ENCOUNTER
Completed form faxed to [employer] at requested fax and irogonal mailed attention marketing at  [employer]

## 2020-03-27 NOTE — TELEPHONE ENCOUNTER
Gege from [facility] would like yo confirm if doctor has received a Medicare Re-Certification Form. Confirmed doctor's fax #.

## 2020-03-28 PROBLEM — D72.829 LEUKOCYTOSIS, UNSPECIFIED TYPE: Status: ACTIVE | Noted: 2020-01-01

## 2020-03-28 PROBLEM — I95.9 HYPOTENSION: Status: ACTIVE | Noted: 2020-01-01

## 2020-03-28 PROBLEM — I95.9 HYPOTENSION, UNSPECIFIED HYPOTENSION TYPE: Status: ACTIVE | Noted: 2020-01-01

## 2020-03-28 NOTE — ED INITIAL ASSESSMENT (HPI)
Patient brought from [facility] by EMS. Patient baseline Alert and oriented x2. Patient sent for hypotension. Patient had emesis x2 today.

## 2020-03-28 NOTE — CONSULTS
[facility]    Report of Consultation     Ruby Dunn Patient Status:  Inpatient    1926 MRN [id number]   Location [facility] 2W/SW Attending Marjorie Goode MD   Hosp Day # 0 PCP Dontae Finley MD     Date of Admission:  3 • Heart Disease Father         CAD   • Heart Disease Sister         CAD   • Cancer Sister         lung cancer (cause of death)    • Alcohol and Other Disorders Associated Sister    • Other (autoimmune d/o [Other]) Daughter         microscopic angitis   • Human (NOVOLIN R) 100 UNIT/ML injection 1-5 Units, 1-5 Units, Subcutaneous, 4 times per day  ondansetron HCl (ZOFRAN) injection 4 mg, 4 mg, Intravenous, Q6H PRN  0.9% NaCl infusion, , Intravenous, Continuous  Piperacillin Sod-Tazobactam So (ZOSYN) 3.375 g Tab, Take 1 tablet (25 mg total) by mouth 2x Daily(Beta Blocker). furosemide 20 MG Oral Tab, Take 0.5 tablets (10 mg total) by mouth every other day.   AMLODIPINE BESYLATE 5 MG Oral Tab, TAKE 1 TABLET BY MOUTH EVERY 12 HOURS  LISINOPRIL 40 MG Oral Tab, TONO CA 8.4 (L) 03/28/2020    ALB 2.0 (L) 03/28/2020    ALKPHO 312 (H) 03/28/2020    TP 5.4 (L) 03/28/2020    AST 67 (H) 03/28/2020    ALT 21 03/28/2020    T4F 1.2 01/15/2020    TSH 2.320 02/25/2020    LIP 55 (L) 03/27/2020    ESRML 7 05/09/2019    CRP <0.29 your patient. Arvil Prudent.  Miladys  3/28/2020

## 2020-03-28 NOTE — ED PROVIDER NOTES
Patient Seen in: [facility] AND [facility] Emergency Department      History   Patient presents with:  Hypotension    Stated Complaint: hypotension    HPI     80year old female who usually lives at [facility] but currently is staying at [facility] for Other systems are as noted in HPI. Constitutional and vital signs reviewed. All other systems reviewed and negative except as noted above.     Physical Exam     ED Triage Vitals [03/27/20 2302]   BP (!) 78/39   Pulse 66   Resp 18   Temp 97.7 °F (36.5 Motor: Motor function is intact.            ED Course     Labs Reviewed   BASIC METABOLIC PANEL (8) - Abnormal; Notable for the following components:       Result Value    BUN 41 (*)     Creatinine 1.78 (*)     BUN/CREA Ratio 23.0 (*)     Calcium, Total CBC W/ DIFFERENTIAL[[id number]]          Abnormal            Final result                 Please view results for these tests on the individual orders.    SCAN SLIDE   RAINBOW DRAW BLUE   RAINBOW DRAW LAVENDER   RAINBOW DRAW LIGHT GREEN   RAINBOW DRAW GOLD Critical Care:  I spent a total of 30 minutes of critical care time in obtaining history, performing a physical exam, bedside monitoring of interventions, collecting and interpreting tests and discussion with consultants but not including time spent perform No follow-up provider specified. We recommend that you schedule follow up care with a primary care provider within the next three months to obtain basic health screening including reassessment of your blood pressure.     Medications Prescribed:  Andra Luciano

## 2020-03-28 NOTE — ED NOTES
Patient had R hip surgery earlier this month. Bruising to inside of thigh and labia. No bruising seen along incision. Incision clean dry and intact. Patient has stage 2 ulcer to sacrum. +3 pitting edema to bilateral lower extremities.  Pedal pulses palpable

## 2020-03-28 NOTE — DIETARY NOTE
ADULT NUTRITION INITIAL ASSESSMENT    Pt is at moderate nutrition risk. Pt does not meet malnutrition criteria at this time. Kenny Richards Unable to conduct face to face interview or nutrition focused physical exam with pt d/t limited contact restrictions due to the ANTHROPOMETRICS:  HT: 167.6 cm (5' 6\")  WT: 50 kg (110 lb 3.7 oz)   BMI: Body mass index is 17.79 kg/m².   BMI CLASSIFICATION: less than 19 kg/m2 - underweight  IBW: 130 lbs        85% IBW  Usual Body Wt: 117 lbs 1 yr ago, 125-130# couple yrs ago 8. 3* 8.4*    141   K 4.9 5.7*    110   CO2 21.0 19.0*   OSMOCALC 304* 309*       NUTRITION RELATED PHYSICAL FINDINGS:  - Body Fat/Muscle Mass: suspect fat and muscle deficits given advanced age, wt loss hx and low BMI, but unable to perform nut

## 2020-03-28 NOTE — PLAN OF CARE
Pt alert to name only, started on food today after swallow eval, purred, continue to monitor BP, held meds for low bp and hr, pt did not urinate up till 3pm, Dr Paige Reynolds aware, bladder scan 144, will continue to monitor.  Turn q2hr family updated      Problem: baseline  Description  INTERVENTIONS:  - Continuous cardiac monitoring, monitor vital signs, obtain 12 lead EKG if indicated  - Evaluate effectiveness of antiarrhythmic and heart rate control medications as ordered  - Initiate emergency measures for life t

## 2020-03-28 NOTE — SLP NOTE
ADULT SWALLOWING EVALUATION    ASSESSMENT    ASSESSMENT/OVERALL IMPRESSION:      This BSE was ordered d/t suspected aspiration. PMH includes dementia. Reportedly, Pt on solid/thin liquids prior to admission.        Pt alert, on room air, afebrile and assess Recommendations - Liquid: Thin       Compensatory Strategies Recommended: No straws  Aspiration Precautions: Upright position; Slow rate;Small bites and sips; No straw  Medication Administration Recommendations: Whole in puree  Treatment Plan/Recommendations liquids;Puree;Hard solid  Method of Presentation: Staff/Clinician assistance;Cup;Straw  Patient Positioning: Upright;Midline    Oral Phase of Swallow: Impaired        Bolus Formation: Impaired  Bolus Propulsion: Impaired  Mastication: Impaired       Pharyn

## 2020-03-28 NOTE — CM/SW NOTE
Jalen Woodson from [facility] calling for update on patient - notified her patient being admitted to [facility] for hypotension.   MERLIN Olivia v/u.

## 2020-03-28 NOTE — PROGRESS NOTES
This is a follow-up from an admission from earlier today. Patient is septic with an elevated lactic acid, low blood pressure, elevated white count. Does not appear to be urinary tract infection or pulmonary infection.   Ultrasound the right upper quadrant

## 2020-03-28 NOTE — PROGRESS NOTES
[facility]    CDI Prediction Tool Protocol (Vancomycin Initiated)    OVP (oral vancomycin prophylaxis) 125 mg PO Daily is being started in this patient based on a score of 14.       Score Breakdown:  High risk antibiotic use (5 points)  Malignanc

## 2020-03-28 NOTE — H&P
[facility] Patient Status:  Inpatient    1926 MRN [id number]   Location [facility] 2W/SW Attending Danielito Gifford MD   Hosp Day # 0 PCP Rahul Farley MD     Date:  3/28/2020  Date Sister    • Other (autoimmune d/o [Other]) Daughter         microscopic angitis   • Breast Cancer Daughter 40   • Other (alive and well [Other]) Son       reports that she has never smoked.  She has never used smokeless tobacco. She reports that she does no for Anxiety.    Lancets Ultra Thin Does not apply Misc   No No   Sig: Test 3 times per day   MIRTAZAPINE 15 MG Oral Tab   No No   Sig: TAKE 1 TABLET BY MOUTH DAILY AT BEDTIME   TRAVATAN Z 0.004 % Ophthalmic Solution   Yes No   Sig: Apply 1 drop to eye night chest wall expansion. Cardiovascular:  Normal rate, regular rhythm, no murmur, 2+ bilateral lower extremity pitting edema.   Gastrointestinal: Firm, moderate tenderness in right upper quadrant and epigastric area, non-distended, normal bowel sounds, no org BMP later. Chronic diastolic heart failure  We will continue monitor closely with I's and O's and daily weights.     Type 2 diabetes with associated nephropathy  Will remain n.p.o. for now, will hold daytime insulin today considering patient will remain

## 2020-03-29 NOTE — PLAN OF CARE
Pt's pulse ox desaturated to 82 suddenly , assessed pt , unresponsive , oxygen increased , with no response, pt was agonal breathing, pt is a DNR.  Pt stopped breathing VS and breathing, pulse  undetectable and was pronounced at 1443 by Enzo Chaudhry RN

## 2020-03-29 NOTE — OCCUPATIONAL THERAPY NOTE
Chart reviewed and attempted OT evaluation. Patient with c-diff requiring flexiseal placement. RN requested therapy reattempt later.

## 2020-03-29 NOTE — PLAN OF CARE
Pt's daughter Clyde Lewis notified by Dr Dorita Shaw of death, spoke with her and she wishes not to come in and see pt. [place] notified.

## 2020-03-29 NOTE — PHYSICAL THERAPY NOTE
Chart reviewed. Spoke with MERLIN Adams. Pt not appropriate to be seen today. Will reattempt as able and as pt is medically cleared.

## 2020-03-29 NOTE — SLP NOTE
Attempted to see patient for f/u tx session per 3/28 BSSE results and recommendations. However, pt now with increased lethargy and unable to participate in safe PO trials for meal attempt as tray arrived.  RN unable to given meds at this time as patient wit

## 2020-03-29 NOTE — PROGRESS NOTES
[facility]  Hospitalist Progress Note     Roberta Wade Patient Status:  Inpatient    1926  80year old CSN [id number]   Location -A Attending Leon Contreras MD   Hosp Day # 1 PCP Yulia Holt MD     ASSESSMENT/PLAN    [facility] heparin  Code status: DNR  Dispo: PCU    SUBJECTIVE  Patient is quite somnolent. Not interactive. Not talking.   She does open her eyes    OBJECTIVE  Temp:  [94.5 °F (34.7 °C)-98.1 °F (36.7 °C)] 98.1 °F (36.7 °C)  Pulse:  [59-62] 62  Resp:  [20-35] 27  BP piperacillin-tazobactam  3.375 g Intravenous Q12H   • Pantoprazole Sodium  40 mg Oral QAM AC   • Insulin Aspart Pen  1-5 Units Subcutaneous TID CC and HS   • insulin detemir  15 Units Subcutaneous Nightly     acetaminophen, ipratropium-albuterol, LORazepam

## 2020-03-29 NOTE — PLAN OF CARE
Problem: CARDIOVASCULAR - ADULT  Goal: Absence of cardiac arrhythmias or at baseline  Description  INTERVENTIONS:  - Continuous cardiac monitoring, monitor vital signs, obtain 12 lead EKG if indicated  - Evaluate effectiveness of antiarrhythmic and heart document skin integrity  - Assess and document dressing/incision, wound bed, drain sites and surrounding tissue  - Implement wound care per orders  - Initiate isolation precautions as appropriate  - Initiate Pressure Ulcer prevention bundle as indicated  O

## 2020-03-30 NOTE — DISCHARGE SUMMARY
[facility] HOSPITALIST  DISCHARGE SUMMARY      Ruby Dunn Patient Status:  Inpatient    1926 MRN [id number]   Location [facility] 2W/SW Attending No att. providers found   Hosp Day # 1 PCP Artis Quinteros MD     DATE OF ADMISSION: 3/27/202 her passing. No resuscitation efforts were made in accordance with patient and family preferences. I informed the daughter the patient's passing, all questions answered.     Patient understands return to the emergency room for increased pain, fever, disch

## 2020-03-31 ENCOUNTER — TELEPHONE (OUTPATIENT)
Dept: INTERNAL MEDICINE UNIT | Facility: HOSPITAL | Age: 85
End: 2020-03-31

## 2020-03-31 ENCOUNTER — TELEPHONE (OUTPATIENT)
Dept: INTERNAL MEDICINE CLINIC | Facility: CLINIC | Age: 85
End: 2020-03-31

## 2020-03-31 ENCOUNTER — TELEPHONE (OUTPATIENT)
Dept: CARDIOLOGY | Age: 85
End: 2020-03-31

## 2020-03-31 NOTE — TELEPHONE ENCOUNTER
ALL paperwork re-faxed to [facility]. Copy found under Media from date 3/20/20-[facility] document. Total of 7 pages faxed to [phone] including cover page.

## 2020-03-31 NOTE — TELEPHONE ENCOUNTER
Death certificate completed and signed by Hospitalist MD Dr. Bashir Del Valle. Submitted via fax to [phone]. Confirmation fax received.

## 2020-03-31 NOTE — TELEPHONE ENCOUNTER
Gege from [facility] is calling and states they only received one page back and they need all the forms faxed back. Please re fax.

## 2020-04-06 ENCOUNTER — TELEPHONE (OUTPATIENT)
Dept: INTERNAL MEDICINE CLINIC | Facility: CLINIC | Age: 85
End: 2020-04-06

## 2020-04-06 NOTE — TELEPHONE ENCOUNTER
Please  Figure out if  We still have  Form or  aske  Them to refax  So I can sign them today while I am in the office

## 2020-04-06 NOTE — TELEPHONE ENCOUNTER
Gege from [facility] is calling about forms she re-faxed over on 4/3. DR. Gregor Lamar did sign the page that needed a signature, but she did not fax back the other 2 pages that were with it. Gege needs ALL the documents she faxed to the  To be sent back to her (along with the Marie Signature).      Please contact Gege for any questions

## 2020-04-09 ENCOUNTER — APPOINTMENT (OUTPATIENT)
Dept: CARDIOLOGY | Age: 85
End: 2020-04-09
Attending: INTERNAL MEDICINE

## 2020-04-09 ENCOUNTER — APPOINTMENT (OUTPATIENT)
Dept: CARDIOLOGY | Age: 85
End: 2020-04-09

## 2020-04-12 ENCOUNTER — EXTERNAL FACILITY (OUTPATIENT)
Dept: INTERNAL MEDICINE CLINIC | Facility: CLINIC | Age: 85
End: 2020-04-12

## 2020-04-12 DIAGNOSIS — S72.391D OTHER CLOSED FRACTURE OF SHAFT OF RIGHT FEMUR WITH ROUTINE HEALING, SUBSEQUENT ENCOUNTER: ICD-10-CM

## 2020-04-12 DIAGNOSIS — R26.2 DIFFICULTY WALKING: ICD-10-CM

## 2020-04-12 DIAGNOSIS — Z79.4 TYPE 2 DIABETES MELLITUS WITHOUT COMPLICATION, WITH LONG-TERM CURRENT USE OF INSULIN (HCC): ICD-10-CM

## 2020-04-12 DIAGNOSIS — E11.39 TYPE 2 DIABETES MELLITUS WITH OTHER DIABETIC OPHTHALMIC COMPLICATION (HCC): ICD-10-CM

## 2020-04-12 DIAGNOSIS — I10 ESSENTIAL HYPERTENSION: ICD-10-CM

## 2020-04-12 DIAGNOSIS — E11.9 TYPE 2 DIABETES MELLITUS WITHOUT COMPLICATION, WITH LONG-TERM CURRENT USE OF INSULIN (HCC): ICD-10-CM

## 2020-04-12 DIAGNOSIS — R53.1 GENERALIZED WEAKNESS: ICD-10-CM

## 2020-04-12 DIAGNOSIS — E78.00 PURE HYPERCHOLESTEROLEMIA: ICD-10-CM

## 2020-04-12 DIAGNOSIS — F01.50 VASCULAR DEMENTIA WITHOUT BEHAVIORAL DISTURBANCE (HCC): ICD-10-CM

## 2020-04-13 NOTE — PROGRESS NOTES
pt seen 3/25/2020 at [facility]    pt seen in pt room no new complaints,     confused at base line     vitals and nursing notes noted     subj:  pain to leg  no cp  no sob  confused at base line    vit Stable       obj:  Confused  No distress  Normocephalic atr appreciated  Actually the ulcers are superficial dry and I agree with no immediete surgical intervention and to fu with OP podiatry at [place] when current condition is improved after therapy    DVT prophylaxis as per Ortho - change to Eliquis 2.5 bid  CO 20 mg by mouth daily. ferrous sulfate 325 (65 FE) MG tablet  Take 1 tablet by mouth daily (with breakfast). furosemide 20 MG tablet  Commonly known as: LASIX  Take 10 mg by mouth every other day.     glucose 4 g chewable tablet  Chew 4 g by mouth as n

## 2020-04-13 NOTE — PROGRESS NOTES
pt seen 3/23/2020 at [facility]    pt seen in pt room working with pt, some pain to leg     Hospital Course  80years old female with a history of proximal atrial fibrillation, diastolic heart failure hypertension chronic kidney disease stage III, type 2 diabet doing well no fevers      Urinary tract infection  Klebsiella pneumonia  was on ceftriaxone  switch to cefuroxime for 1 week course total     Type 2 diabetes mellitus on insulin  Poorly controlled  Sugars greater than 200-although eating poorly stress-kamryn tablet by mouth every 12 hours. atorvastatin 10 MG tablet  Commonly known as: LIPITOR    cefUROXime 500 MG tablet  Commonly known as: CEFTIN  Take 1 tablet by mouth 2 times daily for 4 days. CHOLECALCIFEROL PO  Take 2,000 Units by mouth daily.     div

## 2020-05-11 ENCOUNTER — TELEPHONE (OUTPATIENT)
Dept: INTERNAL MEDICINE CLINIC | Facility: CLINIC | Age: 85
End: 2020-05-11

## 2020-05-11 NOTE — TELEPHONE ENCOUNTER
Gege with [facility] called and is asking for a call back, Please ask for Tonie. She advised they faxed some forms over (twice) for Home Health and they have not gotten a response. Please advise.

## 2020-11-16 ENCOUNTER — APPOINTMENT (OUTPATIENT)
Dept: HEMATOLOGY/ONCOLOGY | Facility: HOSPITAL | Age: 85
End: 2020-11-16
Attending: INTERNAL MEDICINE
Payer: MEDICARE

## 2022-11-07 NOTE — TELEPHONE ENCOUNTER
Spoke to patient daughter on 5/15/2019 advised that x-ray of the toe shows improvement, MRI shows destruction of the bone but no other significant worsening findings reported.   Blood test shows normal sed rate and C-reactive protein, normal hemoglobin, sta Female

## 2024-06-12 NOTE — PROGRESS NOTES
ADMISSION NOTE    80year old female with CLL, meningioma, diabetes was sent from [facility] because of periods of confusion. Noted in ED to have brief  staring episodes of unresponsiveness,  Glucose has also been elevated.   Admitted to rule out TIA vs sei Spoke to patient's mom let her know note was extended

## 2024-09-16 NOTE — PROGRESS NOTES
Good pedal pulse. You have follow up appt with her next week, let me know how it goes.     Felipa Mcgarry
Patient awake and alert x 3, right groin soft, non tender, no hematoma. Patient able to eat, drink and void before discharge. Instructions given to patient and family, they stated understanding.
done

## (undated) DEVICE — Device

## (undated) DEVICE — LAWSON - CANISTER SUCT W/LID 1200CC

## (undated) NOTE — IP AVS SNAPSHOT
[facility]            (For Outpatient Use Only) Initial Admit Date: 12/20/2019   Inpt/Obs Admit Date: Inpt: 12/21/19 / Obs: N/A   Discharge Date:    Rajesh Chan:  [de-identified]   MRN: [de-identified]   CSN: [id number]   CEID: [id number]        E Group Number:  Insurance Type:    Subscriber Name:  Subscriber :    Subscriber ID:  Pt Rel to Subscriber:    Hospital Account Financial Class: Medicare    2019

## (undated) NOTE — LETTER
[facility], [place]  Authorization for Invasive Procedures  1. I hereby authorize Vishnu Torres , my physician and whomever may be designated as the doctor's assistant, to perform the following operation and/or procedure:   Insertion performed for the purposes of advancing medicine, science, and/or education, provided my identity is not revealed. If the procedure has been videotaped, the physician/surgeon will obtain the original videotape.  The hospital will not be responsible for stor My signature below affirms that prior to the time of the procedure, I have explained to the patient and/or her legal representative, the risks and benefits involved in the proposed treatment and any reasonable alternative to the proposed treatment.  I have

## (undated) NOTE — LETTER
[facility], [place]  Authorization for Invasive Procedures  1.  I hereby authorize  Dr. Terell Calix / Michel Cintron  , my physician and whomever may be designated as the doctor's assistant, to perform the following operation and/or procedure 5. I consent to the photographing of the operations or procedures to be performed for the purposes of advancing medicine, science, and/or education, provided my identity is not revealed.  If the procedure has been videotaped, the physician/surgeon will obta __________ Time: ___________    Statement of Physician  My signature below affirms that prior to the time of the procedure, I have explained to the patient and/or her legal representative, the risks and benefits involved in the proposed treatment and any r

## (undated) NOTE — LETTER
12/11/2019              [address]         Attention to nursing staff.     Please decrease morning insulin give patient 20 units if blood sugar above 100 in the morning, use 15 units in t

## (undated) NOTE — MR AVS SNAPSHOT
[address] [facility]  [phone]               Thank you for choosing us for your health care visit with Octavio Davis.  Cherylene Scotts, MD.  We are glad to serve you and happy to provide you with this summar This list is accurate as of: 2/16/17  7:47 PM.  Always use your most recent med list.                AmLODIPine Besylate 5 MG Tabs   Take 1 tab po twice a day   Commonly known as:  NORVASC           ASPIR-LOW 81 MG Tbec   Generic drug:  aspirin   TAKE 1 TA

## (undated) NOTE — ED AVS SNAPSHOT
Rosalba Teague   MRN: [id number]    Department:  [facility] Emergency Department   Date of Visit:  1/4/2020           Disclosure     Insurance plans vary and the physician(s) referred by the ER may not be covered by your plan.  Please contact within the next three months to obtain basic health screening including reassessment of your blood pressure.     IF THERE IS ANY CHANGE OR WORSENING OF YOUR CONDITION, CALL YOUR PRIMARY CARE PHYSICIAN AT ONCE OR RETURN IMMEDIATELY TO THE EMERGENCY DEPARTMEN

## (undated) NOTE — LETTER
3/14/2020              Vernestine [facility] Matzek        [address]        [place] [address]         Please increase morning insulin Levemir 10 units subcutaneously, continue sliding scale. Please provide blood sugar readings in 1 week.   Stop

## (undated) NOTE — MR AVS SNAPSHOT
Anu  [address]  [phone]               Thank you for choosing us for your [facility] care visit with [facility]   We are glad to serve you and happy to provide you with this summar Metoprolol Succinate  MG Tb24   TAKE 1 AND 1/2 TABLETS BY MOUTH DAILY   Commonly known as:   Toprol XL           * NOVOLOG MIX 70/30 FLEXPEN (70-30) 100 UNIT/ML Supn   Generic drug:  Insulin Aspart Prot & Aspart   Inject  15 units  SQ   Before breakf

## (undated) NOTE — MR AVS SNAPSHOT
[address]  [phone]               Thank you for choosing us for your health care visit with Wu Busby MD.  We are glad to serve you and happy to provide you with this summar product made for clearing earwax. These contain carbamide peroxide and are available over-the-counter in a kit with a small bulb syringe. · Lie down with the blocked ear facing upward. Apply one dropper full of medicine and wait a few minutes.  Grasp the o substitute for professional medical care. Always follow your healthcare professional's instructions.              Allergies as of Jun 08, 2017     Sulfa Antibiotics Unknown    Sulfadiazine     Other reaction(s): SULFADIAZINE                Today's Vital Sig * Notice: This list has 2 medication(s) that are the same as other medications prescribed for you. Read the directions carefully, and ask your doctor or other care provider to review them with you.             Bibianat                  Visit [facility]

## (undated) NOTE — ED AVS SNAPSHOT
[facility] Emergency Department    Breanne [address]     [place] [address]    Phone:  [phone]    Fax:  [address]   MRN: [id number]    Department:  [facility] Emergency Department   Date of Visit:  4/ have any questions regarding your home medications, including potential side effects.               Medication List      START taking these medications     TraMADol HCl 50 MG Tabs   Quantity:  10 tablet   Commonly known as:  ULTRAM   Take 1 tablet (50 mg to tell this physician (or your personal doctor if your instructions are to return to your personal doctor) about any new or lasting problems.  The primary care or specialist physician may see patients referred from the [facility] Emergency Dep [address] [facility] ([address] [place] [address],[address]Th Floor) [address] ([facility]) [phone]   Lili Arce [address] . [facility] ([place] [address]) [address] [facility] [address] Yulissa Luna  (

## (undated) NOTE — ED AVS SNAPSHOT
Brad Negrete   MRN: [id number]    Department:  [facility] Emergency Department   Date of Visit:  1/15/2020           Disclosure     Insurance plans vary and the physician(s) referred by the ER may not be covered by your plan.  Please contac within the next three months to obtain basic health screening including reassessment of your blood pressure.     IF THERE IS ANY CHANGE OR WORSENING OF YOUR CONDITION, CALL YOUR PRIMARY CARE PHYSICIAN AT ONCE OR RETURN IMMEDIATELY TO THE EMERGENCY DEPARTMEN

## (undated) NOTE — LETTER
2/4/2020              [address] [facility]         Please start home health services, RN to see patient, monitor vital signs, blood sugars, since mental status. ,  Dressing changes to the left foot

## (undated) NOTE — MR AVS SNAPSHOT
[facility]  [address] [place] [address], [address]   [address]               Thank you for choosing us for your health care visit with Paris Walker MD.  We are glad to serve you and happy to provide you with this summary of yo [facility] [address]   [phone]              Allergies as of Jan 11, 2017     Sulfa Antibiotics Unknown    Sulfadiazine     Other reaction(s): SULFADIAZINE                Today's Vital Signs     BP Pulse Weight             154/63 mmHg 50 129 lb (58.514 kg Assoc Dx:  Abnormal chest x-ray [R93.8]           CBC W Differential W Platelet [E]    Complete by:  Jan 11, 2017 (Approximate)    Assoc Dx:  Type 2 diabetes mellitus without complication, with long-term current use of insulin (HCC) [E11.9, Z79.4] Visit [facility] online at  [email or web address]

## (undated) NOTE — ED AVS SNAPSHOT
Luigi Young   MRN: [id number]    Department:  [facility] Emergency Department   Date of Visit:  11/9/2017           Disclosure     Insurance plans vary and the physician(s) referred by the ER may not be covered by your plan.  Please contac within the next three months to obtain basic health screening including reassessment of your blood pressure.     IF THERE IS ANY CHANGE OR WORSENING OF YOUR CONDITION, CALL YOUR PRIMARY CARE PHYSICIAN AT ONCE OR RETURN IMMEDIATELY TO THE EMERGENCY DEPARTMEN

## (undated) NOTE — LETTER
01/05/18        [address]      Dear [facility],    [address] [facility] records indicate that you have outstanding lab work and or testing that was ordered for you and has not yet been completed:          NETTIE YANCEY Different

## (undated) NOTE — ED AVS SNAPSHOT
Dmitri Cee   MRN: [id number]    Department:  [facility] Emergency Department   Date of Visit:  8/11/2017           Disclosure     Insurance plans vary and the physician(s) referred by the ER may not be covered by your plan.  Please contac CARE PHYSICIAN AT ONCE OR RETURN IMMEDIATELY TO THE EMERGENCY DEPARTMENT. If you have been prescribed any medication(s), please fill your prescription right away and begin taking the medication(s) as directed.   If you believe that any of the medications

## (undated) NOTE — LETTER
5/16/2018              [address]         Please decrease metoprolol ER to 100 mg daily only previous dosage was, take 1-1/2 tablet equal 150 mg daily.       Sincerely,    PORTER Otoole

## (undated) NOTE — LETTER
7/12/2017              [address] [facility], [address]         Please increase morning insulin to 28 units  And  Continue  Pm insulin at 15 units. Please provide  sugar diary in 2-3 weeks.       Sincerely,

## (undated) NOTE — ED AVS SNAPSHOT
[facility] Emergency Department    [address]     [place] [address]    Phone:  [phone]    Fax:  145 [address]   MRN: [id number]    Department:  [facility] Emergency Department   Date of Visit:  4/ and Class Registration line at [phone] or find a doctor online by visiting [email or web address].    IF THERE IS ANY CHANGE OR WORSENING OF YOUR CONDITION, CALL YOUR PRIMARY CARE PHYSICIAN AT ONCE OR RETURN IMMEDIATELY TO [address] [place] [address].     If

## (undated) NOTE — IP AVS SNAPSHOT
Patient Demographics     Address  [facility] [address] Phone  [phone] (Home) *Preferred*  [phone] [facility]) E-mail Address  [email or web address]      Emergency Contact(s)     Name Relation Home Work [employer] [facility] Take 1 tablet (25 mg total) by mouth 2x Daily(Beta Blocker). vancomycin HCl 50 MG/ML Solr  Commonly known as:  FIRVANQ  Take 2.5 mL (125 mg total) by mouth daily.   Start taking on:  December 30, 2019        CONTINUE taking these medications    [facility]              Natali Zavala MD In 2 weeks.     Specialty:  CARDIOLOGY  Why:  As needed  Contact information:  [address] [facility] KIN Glucose Blood Strp      Test blood sugar three times daily   Thu Hernandez MD         Insulin Aspart Pen 100 UNIT/ML Sopn  Commonly known as:  NOVOLOG  Next dose due:   Tomorrow morning       Inject 1-7 Units into the skin 3 (three) times daily with furosemide 20 MG Tabs  Insulin Aspart Pen 100 UNIT/ML Sopn  insulin detemir 100 UNIT/ML Sopn  ipratropium-albuterol 0.5-2.5 (3) MG/3ML Soln  metoprolol Tartrate 25 MG Tabs  vancomycin HCl 50 MG/ML Solr           [id number] - MAR ACTION REPORT  (last 24 hrs) [id number] metoprolol Tartrate (LOPRESSOR) tab 25 mg 12/29/19 0519 Given      [id number] vancomycin HCl [facility]) 50 MG/ML oral solution 125 mg 12/29/19 0838 Given            BILATERAL EYES     Order ID Medication Name Action Time Action Reason Comments WITH PLATELET.   Procedure                               Abnormality         Status                     ---------                               -----------         ------                     CBC W/ DIFFERENTIAL[[id number]]          Abnormal            Final Bilirubin, Total 0.6 0.1 - 2.0 mg/dL [facility]   Total Protein 5.8 6.4 - 8.2 g/dL  [facility]   Albumin 2.4 3.4 - 5.0 g/dL  [facility]   Globulin  3.4 2.8 - 4.4 g/dL [facility]   A/G Ratio 0.7 1.0 - 2.0  [facility]            MAGNESIUM [8497 Microscopic Microscopic not indicated — — [facility]            Testing Performed By     Lab - Abbreviation Name Director Address Valid Date Range    [address] [facility] Russell Catherine. Savannah Salgado M.D. [facility] [address] [place] [address] Zannie Cogan [address] 04/29/14 1 Author:  Nik Corrales MD Service:  Hospitalist Author Type:  Physician    Filed:  12/28/2019 10:39 PM Status:  Signed    :  Nik Corrales MD (Physician)       [facility]    PATIENT'S NAME: Vania Ansari   ATTENDING PHYSI CBCs tend to run in the 14,000 to 15,000 range, although she has had white counts in the 20,000 range previously.   The emergency room physician spoke with Neurology, and the patient was admitted to cardiac telemetry unit for further evaluation and treatmen patient was going to call the facility to verify the med rec before medications were ordered. ALLERGIES:  Sulfa. FAMILY HISTORY:  Her mother  in her 80s of heart failure.   Her father  of coronary artery disease; he was, I guess, considerabl Glucose was 336, sodium was 134, potassium 4.4, chloride 105, CO2 of 19, BUN of 42 with a creatinine 1.55, calcium 9.3 anion gap of 10. Liver function tests were:  Alkaline phosphatase 99, AST 96, ALT 59, total bilirubin 1.5, magnesium 2.3.   Lactic acid l more elevated than it typically is. However, I suspect this is related to stress. We will continue to follow, however. 4.   Hypertension.   For now, we will keep the patient n.p.o. as she may require a pacemaker semi-urgently, and cover blood pressure wi 1. ED Consult to Neurology [[id number]] ordered by Mary Ann Knox MD at 19 0206              Neurology Inpatient Consult Note    Lora Jay : 1926   Referring Physician:[AS.1] Dr. Levi Zayas  HPI:     Lora Jay i • Cancer Sister         lung cancer (cause of death)    • Alcohol and Other Disorders Associated Sister    • Other (autoimmune d/o [Other]) Daughter         microscopic angitis   • Breast Cancer Daughter 40   • Other (alive and well [Other]) Son       Soci Cr 1.55  TSH 4.29  Glucose 336[AS.1]    CT head wo, images and report reviewed  CONCLUSION:   1. No acute intracranial finding. 2.   Advanced chronic small vessel disease in both cerebral hemispheres-not unexpected for age.   3. Stable chronic calcified ce :  Richa Elder MD (Physician)       Dc summary#  > 30 min spent on [address] Discharge Diagnoses: sss    Lace+ Score: 81  59-90 High Risk  29-58 Medium Risk  0-28   Low Risk. TCM Follow-Up Recommendation:  LACE > 58:  High Risk o on documentation in the [facility] Emergency Room '6 clicks' Inpatient Basic Mobility Short Form. Research supports that patients with this level of impairment may benefit from subacute rehab facility once medically cleared.      DISCHARGE RECOMMENDATIONS  PT Discharge Recomm Gait Assistance: Minimum assistance  Distance (ft): steps to the chair  Assistive Device: Rolling walker  Pattern: Shuffle  Stoop/Curb Assistance: Not tested  Comment : pt was able to march in place for about 30 sec        Patient End of Session: Up in [place] PHYSICAL THERAPY ASSESSMENT     Patient in bed agreeable for PT/OT session and RN approved. Patient is Mod A x 2 for bed mobility supine to sit. Mod A x 2 transfers sit <> stand and SPT from bed to chair with RW.  Patient able to amb 12 ft with RW with Mod -   Moving from lying on back to sitting on the side of the bed?: A Lot   How much help from another person does the patient currently need. ..   -   Moving to and from a bed to a chair (including a wheelchair)?: A Lot   -   Need to walk in hospital room?: Occupational Therapy Notes (last 72 hours) (Notes from 12/26/2019  4:16 PM through 12/29/2019  4:16 PM)    No notes of this type exist for this encounter. Video Swallow Study Notes    No notes of this type exist for this encounter.         SLP Note - SL Description:  Patient's Short Term Goal: to go home    Interventions:   - Vapotherm  - PT/ OT eval  - IV ABX  - Purewick  - IV lasix  - See additional Care Plan goals for specific interventions

## (undated) NOTE — LETTER
1/15/2020              [address] [facility]       Please arrange home health services, RN to see patient to monitor vitals/report blood pressure readings if systolic blood pressure below 100 frequ

## (undated) NOTE — ED AVS SNAPSHOT
Misa Martinez   MRN: [id number]    Department:  [facility] Emergency Department   Date of Visit:  3/3/2020           Disclosure     Insurance plans vary and the physician(s) referred by the ER may not be covered by your plan.  Please contact within the next three months to obtain basic health screening including reassessment of your blood pressure.     IF THERE IS ANY CHANGE OR WORSENING OF YOUR CONDITION, CALL YOUR PRIMARY CARE PHYSICIAN AT ONCE OR RETURN IMMEDIATELY TO THE EMERGENCY DEPARTMEN

## (undated) NOTE — ED AVS SNAPSHOT
Erica Bertrand   MRN: [id number]    Department:  [facility] Emergency Department   Date of Visit:  11/29/2019           Disclosure     Insurance plans vary and the physician(s) referred by the ER may not be covered by your plan.  Please conta within the next three months to obtain basic health screening including reassessment of your blood pressure.     IF THERE IS ANY CHANGE OR WORSENING OF YOUR CONDITION, CALL YOUR PRIMARY CARE PHYSICIAN AT ONCE OR RETURN IMMEDIATELY TO THE EMERGENCY DEPARTMEN

## (undated) NOTE — ED AVS SNAPSHOT
Crisoforo Babinski   MRN: [id number]    Department:  [facility] Emergency Department   Date of Visit:  8/7/2017           Disclosure     Insurance plans vary and the physician(s) referred by the ER may not be covered by your plan.  Please contact CARE PHYSICIAN AT ONCE OR RETURN IMMEDIATELY TO THE EMERGENCY DEPARTMENT. If you have been prescribed any medication(s), please fill your prescription right away and begin taking the medication(s) as directed.   If you believe that any of the medications

## (undated) NOTE — Clinical Note
4/12/2017              [address] [facility], [address] \[facility] [place] [facility]\"" [address].         Please decrease insulin dose NovoLog mix 70/ in  AM to 25 units  Daily, encourage patient to have midmorning snacks.   Patient can use tramadol

## (undated) NOTE — MR AVS SNAPSHOT
[facility]  [address] [place] [address], [address] [facility] [address] [facility] [address] Cheryl [address]ieta               Thank you for choosing us for your health care visit with Mansi Gutiérrez MD.  We are glad to serve you and happy to provide you with this summary of yo Today's Vital Signs     BP Pulse Weight             141/53 mmHg 53 113 lb (51.256 kg)            Current Medications          This list is accurate as of: 4/12/17 11:30 AM.  Always use your most recent med list.                AmLODIPine Besylate 14 [facility], [address] [place] [address] [phone], [address] [facility]     Phone:  [phone]    - TraMADol HCl 50 MG Tabs            Marion                  Visit [facility] online at  [email or web address]

## (undated) NOTE — LETTER
October 19, 2017    [address] [place] [address]      Dear Sawyer Engel:   It was a pleasure speaking with you over the phone recently regarding our Chronic Care Management program. To follow up, I wanted to send you some